# Patient Record
Sex: MALE | Race: BLACK OR AFRICAN AMERICAN | Employment: UNEMPLOYED | URBAN - METROPOLITAN AREA
[De-identification: names, ages, dates, MRNs, and addresses within clinical notes are randomized per-mention and may not be internally consistent; named-entity substitution may affect disease eponyms.]

---

## 2017-02-02 ENCOUNTER — OFFICE VISIT (OUTPATIENT)
Dept: INTERNAL MEDICINE CLINIC | Age: 52
End: 2017-02-02

## 2017-02-02 VITALS
TEMPERATURE: 98.7 F | BODY MASS INDEX: 22.43 KG/M2 | DIASTOLIC BLOOD PRESSURE: 80 MMHG | HEIGHT: 73 IN | WEIGHT: 169.2 LBS | OXYGEN SATURATION: 98 % | SYSTOLIC BLOOD PRESSURE: 117 MMHG | HEART RATE: 89 BPM

## 2017-02-02 DIAGNOSIS — J40 BRONCHITIS: Primary | ICD-10-CM

## 2017-02-02 RX ORDER — AZITHROMYCIN 250 MG/1
250 TABLET, FILM COATED ORAL SEE ADMIN INSTRUCTIONS
Qty: 6 TAB | Refills: 0 | Status: SHIPPED | OUTPATIENT
Start: 2017-02-02 | End: 2017-02-07

## 2017-02-02 NOTE — PROGRESS NOTES
HISTORY OF PRESENT ILLNESS  Selene Lares is a 49 y.o. male. HPI   Got sick last yesterday  Had subjective f/c, sweats and achiness  Cough with clear to yellow phlegm     saw nephrologist recently Dr Char Gallegos function stable per pt. Patient Active Problem List    Diagnosis Date Noted    Ulnar neuropathy at elbow of left upper extremity 09/07/2016    Cervical post-laminectomy syndrome 09/07/2016    Cervical radiculopathy due to degenerative joint disease of spine 09/07/2016    PPD positive 01/12/2015    ADD (attention deficit disorder with hyperactivity) 11/15/2011    Proteinuria 02/07/2011    FH: CAD (coronary artery disease) 12/16/2010    Solitary kidney 12/14/2010    Gout 12/14/2010    Elevated LFT's 12/14/2010    HTN (hypertension) 02/23/2010    Hyperglycemia 02/23/2010    PTSD (post-traumatic stress disorder) 02/23/2010    Chronic neck pain 02/23/2010    Anxiety 02/23/2010    Family history of diabetes mellitus 02/23/2010     Current Outpatient Prescriptions   Medication Sig Dispense Refill    azithromycin (ZITHROMAX) 250 mg tablet Take 1 Tab by mouth See Admin Instructions for 5 days. 6 Tab 0    sertraline (ZOLOFT) 100 mg tablet Take 1/2 tablet by mouth daily x 1 week and then take 1 whole tablet daily. 30 Tab 0    buPROPion XL (WELLBUTRIN XL) 150 mg tablet Take 1 Tab by mouth every morning. 30 Tab 0    methocarbamol (ROBAXIN) 750 mg tablet Take  by mouth three (3) times daily.  pyridoxine (VITAMIN B-6) 100 mg tablet Take 1 Tab by mouth daily. 30 Tab 0    amLODIPine (NORVASC) 10 mg tablet Take 1 Tab by mouth daily. 30 Tab 6    cloNIDine HCl (CATAPRES) 0.1 mg tablet Take 0.1 mg by mouth every evening.  gabapentin (NEURONTIN) 400 mg capsule Take 400 mg by mouth three (3) times daily.  multivitamin (ONE A DAY) tablet Take 1 Tab by mouth daily.  Cholecalciferol, Vitamin D3, 5,000 unit Tab Take 10,000 Int'l Units by mouth daily.  Indications: VITAMIN D DEFICIENCY      aspirin 81 mg tablet Take 1 Tab by mouth daily. 30 Tab 11    tiZANidine (ZANAFLEX) 2 mg tablet TAKE 1 TO 2 TABLETS PO THREE TIMES A DAY AS NEEDED FOR SPASMS  3    nabumetone (RELAFEN) 500 mg tablet Take  by mouth two (2) times a day.  tadalafil (CIALIS) 20 mg tablet TAKE 1 TABLET BY MOUTH AS NEEDED 8 Tab 6    HYDROcodone-acetaminophen (NORCO) 5-325 mg per tablet Take 1 Tab by mouth every six (6) hours as needed. Allergies   Allergen Reactions    Morphine Itching      Lab Results  Component Value Date/Time   Hemoglobin A1c 5.8 04/16/2010 12:06 PM   Glucose 89 03/11/2015 02:58 PM   LDL, calculated 100 03/11/2015 02:58 PM   Creatinine 1.34 03/11/2015 02:58 PM      Lab Results  Component Value Date/Time   Cholesterol, total 194 03/11/2015 02:58 PM   HDL Cholesterol 50 03/11/2015 02:58 PM   LDL, calculated 100 03/11/2015 02:58 PM   Triglyceride 222 03/11/2015 02:58 PM   CHOL/HDL Ratio 4.0 05/04/2013 07:00 AM       Lab Results  Component Value Date/Time   GFR est AA 72 03/11/2015 02:58 PM   GFR est non-AA 62 03/11/2015 02:58 PM   Creatinine 1.34 03/11/2015 02:58 PM   BUN 20 03/11/2015 02:58 PM   Sodium 139 03/11/2015 02:58 PM   Potassium 4.8 03/11/2015 02:58 PM   Chloride 100 03/11/2015 02:58 PM   CO2 22 03/11/2015 02:58 PM         ROS    Physical Exam   Constitutional: He appears well-developed and well-nourished. No distress. Appears stated age   HENT:   Head: Normocephalic. Mouth/Throat: Oropharynx is clear and moist.   Cardiovascular: Normal rate and regular rhythm. Exam reveals no gallop and no friction rub. No murmur heard. Pulmonary/Chest: Effort normal and breath sounds normal. No respiratory distress. He has no wheezes. He has no rales. He exhibits no tenderness. Abdominal: Soft. Musculoskeletal: He exhibits no edema. Lymphadenopathy:     He has no cervical adenopathy. Neurological: He is alert. Psychiatric: He has a normal mood and affect.    Nursing note and vitals reviewed. ASSESSMENT and PLAN  Adam Uribe was seen today for cold symptoms, nasal congestion, cough and dizziness. Diagnoses and all orders for this visit:    Bronchitis   zpak   otc cough syrup ok   To call if not improving. Other orders  -     azithromycin (ZITHROMAX) 250 mg tablet; Take 1 Tab by mouth See Admin Instructions for 5 days. Follow-up Disposition:  Return in about 6 months (around 8/2/2017) for cpe.

## 2017-02-02 NOTE — MR AVS SNAPSHOT
Visit Information Date & Time Provider Department Dept. Phone Encounter #  
 2/2/2017  1:45 PM Viet Salazar, [address] [place] [address],[address] [phone] [id number] Follow-up Instructions Return in about 6 months (around 8/2/2017) for cpe. Upcoming Health Maintenance Date Due DTaP/Tdap/Td series (1 - Tdap) 2/24/2009 MEDICARE YEARLY EXAM 4/23/2016 INFLUENZA AGE 9 TO ADULT 8/1/2016 FOBT Q 1 YEAR AGE 50-75 10/27/2016 Allergies as of 2/2/2017  Review Complete On: 2/2/2017 By: Glenda Melendez LPN Severity Noted Reaction Type Reactions Morphine  05/04/2013    Itching Current Immunizations  Reviewed on 12/6/2012 Name Date Influenza Vaccine 11/14/2014, 12/6/2012 11:16 AM  
 TB Skin Test (PPD) Intradermal 1/9/2015 Td 2/23/2009 Not reviewed this visit You Were Diagnosed With   
  
 Codes Comments Bronchitis    -  Primary ICD-10-CM: I62 ICD-9-CM: 864 Vitals BP Pulse Temp Height(growth percentile) Weight(growth percentile) SpO2  
 117/80 (BP 1 Location: Left arm, BP Patient Position: Sitting) 89 98.7 °F (37.1 °C) (Oral) 6' 1\" (1.854 m) 169 lb 3.2 oz (76.7 kg) 98% BMI Smoking Status 22.32 kg/m2 Former Smoker BMI and BSA Data Body Mass Index Body Surface Area  
 22.32 kg/m 2 1.99 m 2 Preferred Pharmacy Pharmacy Name Phone [facility] [address] [phone] Your Updated Medication List  
  
   
This list is accurate as of: 2/2/17  2:19 PM.  Always use your most recent med list. amLODIPine 10 mg tablet Commonly known as:  Arlyss Stephanie Take 1 Tab by mouth daily. aspirin 81 mg tablet Take 1 Tab by mouth daily. azithromycin 250 mg tablet Commonly known as:  Myrtle Horacio Take 1 Tab by mouth See Admin Instructions for 5 days. buPROPion  mg tablet Commonly known as:  Jonathan Havers Take 1 Tab by mouth every morning. cholecalciferol (VITAMIN D3) 5,000 unit Tab tablet Commonly known as:  VITAMIN D3 Take 10,000 Int'l Units by mouth daily. Indications: VITAMIN D DEFICIENCY  
  
 cloNIDine HCl 0.1 mg tablet Commonly known as:  CATAPRES Take 0.1 mg by mouth every evening.  
  
 gabapentin 400 mg capsule Commonly known as:  NEURONTIN Take 400 mg by mouth three (3) times daily. HYDROcodone-acetaminophen 5-325 mg per tablet Commonly known as:  Iven Lemons Take 1 Tab by mouth every six (6) hours as needed. methocarbamol 750 mg tablet Commonly known as:  ROBAXIN Take  by mouth three (3) times daily. multivitamin tablet Commonly known as:  ONE A DAY Take 1 Tab by mouth daily. nabumetone 500 mg tablet Commonly known as:  RELAFEN Take  by mouth two (2) times a day. pyridoxine (vitamin B6) 100 mg tablet Commonly known as:  VITAMIN B-6 Take 1 Tab by mouth daily. sertraline 100 mg tablet Commonly known as:  ZOLOFT Take 1/2 tablet by mouth daily x 1 week and then take 1 whole tablet daily. tadalafil 20 mg tablet Commonly known as:  CIALIS  
TAKE 1 TABLET BY MOUTH AS NEEDED  
  
 tiZANidine 2 mg tablet Commonly known as:  Keaau [place] TAKE 1 TO 2 TABLETS PO THREE TIMES A DAY AS NEEDED FOR SPASMS Prescriptions Sent to Pharmacy Refills  
 azithromycin (ZITHROMAX) 250 mg tablet 0 Sig: Take 1 Tab by mouth See Admin Instructions for 5 days. Class: Normal  
 Pharmacy: [facility] [address] [place] [address] #: [phone] Route: Oral  
  
Follow-up Instructions Return in about 6 months (around 8/2/2017) for cpe. Introducing [facility] & HEALTH SERVICES! Dear Guerline aGrcia: 
Thank you for requesting a [employer] account. Our records indicate that you already have an active [employer] account.   You can access your account anytime at [email or web address] Did you know that you can access your hospital and ER discharge instructions at any time in [employer]? You can also review all of your test results from your hospital stay or ER visit. Additional Information If you have questions, please visit the Frequently Asked Questions section of the [employer] website at [email or web address] Remember, [employer] is NOT to be used for urgent needs. For medical emergencies, dial 911. Now available from your iPhone and Android! Please provide this summary of care documentation to your next provider. Your primary care clinician is listed as Marcelle Cowden LEE. If you have any questions after today's visit, please call [phone].

## 2017-03-02 RX ORDER — BUPROPION HYDROCHLORIDE 150 MG/1
TABLET ORAL
Qty: 30 TAB | Refills: 0 | OUTPATIENT
Start: 2017-03-02

## 2017-03-02 RX ORDER — SERTRALINE HYDROCHLORIDE 100 MG/1
TABLET, FILM COATED ORAL
Qty: 30 TAB | Refills: 0 | OUTPATIENT
Start: 2017-03-02

## 2017-03-03 ENCOUNTER — DOCUMENTATION ONLY (OUTPATIENT)
Dept: BEHAVIORAL/MENTAL HEALTH CLINIC | Age: 52
End: 2017-03-03

## 2017-03-03 NOTE — LETTER
3/3/2017 Mr. Flex Salmeron [phone] [facility] [address] Dear Severo Daniele Douglasgurvinder good relationship between provider and patient is essential for quality medical care. The provider/patient relationship depends upon mutual trust, respect, and rapport. Our relationship has reached a point where these criteria are no longer intact. You have consistently failed to come in for scheduled appointments  For this reasons, I will no longer continue to serve as your provider and [address]oon must withdraw from your medical care and treatment effective this date. Although I have terminated our providerpatient relationship, I will assist you with the transition of your health care to other providers, as follows: 
 
1. On-Going/Acute Care. I will provide on-going/acute care for you for a period of 30 days, but in no case later than 4/1/17. After this date, you may seek care with another physician or your local emergency room. 2. Referrals for Future Medical Care. As you may need medical attention in the future, I recommend that you promptly find another provider to care for you. You may require ongoing medical attention for any current or future medical conditions, including but not limited to the following: depression, anxiety. You may contact your insurance company or the [employer] for a [employer] at [facility] for [employer] of physicians who are accepting new patients 3. Medical Records. I will provide you or your new health care provider with a copy of your records upon your request.  Since your records are confidential, Ill require your written authorization to make them available to another provider. Enclosed is an authorization form. Please complete it and return it to me so that we may transition your care. I extend to you my best wishes for your future health. Sincerely, Fredericka Merlin, NP 
 
 
 Enclosure

## 2017-03-09 RX ORDER — PYRIDOXINE HCL (VITAMIN B6) 100 MG
100 TABLET ORAL DAILY
Qty: 30 TAB | Refills: 0 | Status: SHIPPED | OUTPATIENT
Start: 2017-03-09

## 2017-03-14 RX ORDER — TADALAFIL 20 MG/1
TABLET ORAL
Qty: 8 TAB | Refills: 6 | Status: SHIPPED | OUTPATIENT
Start: 2017-03-14 | End: 2018-08-31 | Stop reason: SDUPTHER

## 2017-03-14 NOTE — TELEPHONE ENCOUNTER
Requested Prescriptions     Pending Prescriptions Disp Refills    tadalafil (CIALIS) 20 mg tablet 8 Tab 6     Sig: TAKE 1 TABLET BY MOUTH AS NEEDED   Last OV-2/6/17  Next OV-N/S  Med.  Refilled-3/4/16

## 2017-03-31 ENCOUNTER — OFFICE VISIT (OUTPATIENT)
Dept: INTERNAL MEDICINE CLINIC | Age: 52
End: 2017-03-31

## 2017-03-31 VITALS
HEIGHT: 73 IN | SYSTOLIC BLOOD PRESSURE: 152 MMHG | WEIGHT: 172 LBS | DIASTOLIC BLOOD PRESSURE: 86 MMHG | BODY MASS INDEX: 22.8 KG/M2 | OXYGEN SATURATION: 98 % | TEMPERATURE: 98.2 F | HEART RATE: 74 BPM

## 2017-03-31 DIAGNOSIS — K64.8 INTERNAL HEMORRHOIDS: Primary | ICD-10-CM

## 2017-03-31 DIAGNOSIS — K59.03 DRUG-INDUCED CONSTIPATION: ICD-10-CM

## 2017-03-31 RX ORDER — POLYETHYLENE GLYCOL 3350 17 G/17G
17 POWDER, FOR SOLUTION ORAL DAILY
Qty: 30 PACKET | Refills: 11 | Status: SHIPPED | OUTPATIENT
Start: 2017-03-31 | End: 2017-04-30

## 2017-03-31 NOTE — PROGRESS NOTES
HISTORY OF PRESENT ILLNESS  Waylon Hatchet is a 49 y.o. male. HPI   Constipated, takes lortab one every day for neck pain--Dr Mazin Harrison  Has hemorrhoids now with large bleeding 2 nights ago--painful bleeding  Takes benfiber, senokot which causes diarrhea and probiotic    Patient Active Problem List    Diagnosis Date Noted    Ulnar neuropathy at elbow of left upper extremity 09/07/2016    Cervical post-laminectomy syndrome 09/07/2016    Cervical radiculopathy due to degenerative joint disease of spine 09/07/2016    PPD positive 01/12/2015    ADD (attention deficit disorder with hyperactivity) 11/15/2011    Proteinuria 02/07/2011    FH: CAD (coronary artery disease) 12/16/2010    Solitary kidney 12/14/2010    Gout 12/14/2010    Elevated LFT's 12/14/2010    HTN (hypertension) 02/23/2010    Hyperglycemia 02/23/2010    PTSD (post-traumatic stress disorder) 02/23/2010    Chronic neck pain 02/23/2010    Anxiety 02/23/2010    Family history of diabetes mellitus 02/23/2010     Current Outpatient Prescriptions   Medication Sig Dispense Refill    polyethylene glycol (MIRALAX) 17 gram packet Take 1 Packet by mouth daily for 30 days. 30 Packet 11    pyridoxine, vitamin B6, (VITAMIN B-6) 100 mg tablet Take 1 Tab by mouth daily. 30 Tab 0    tiZANidine (ZANAFLEX) 2 mg tablet TAKE 1 TO 2 TABLETS PO THREE TIMES A DAY AS NEEDED FOR SPASMS  3    sertraline (ZOLOFT) 100 mg tablet Take 1/2 tablet by mouth daily x 1 week and then take 1 whole tablet daily. 30 Tab 0    buPROPion XL (WELLBUTRIN XL) 150 mg tablet Take 1 Tab by mouth every morning. 30 Tab 0    methocarbamol (ROBAXIN) 750 mg tablet Take  by mouth three (3) times daily.  nabumetone (RELAFEN) 500 mg tablet Take  by mouth two (2) times a day.  amLODIPine (NORVASC) 10 mg tablet Take 1 Tab by mouth daily. 30 Tab 6    HYDROcodone-acetaminophen (NORCO) 5-325 mg per tablet Take 1 Tab by mouth every six (6) hours as needed.       cloNIDine HCl (CATAPRES) 0.1 mg tablet Take 0.1 mg by mouth every evening.  gabapentin (NEURONTIN) 400 mg capsule Take 400 mg by mouth three (3) times daily.  multivitamin (ONE A DAY) tablet Take 1 Tab by mouth daily.  Cholecalciferol, Vitamin D3, 5,000 unit Tab Take 10,000 Int'l Units by mouth daily. Indications: VITAMIN D DEFICIENCY      aspirin 81 mg tablet Take 1 Tab by mouth daily. 30 Tab 11    tadalafil (CIALIS) 20 mg tablet TAKE 1 TABLET BY MOUTH AS NEEDED 8 Tab 6     Allergies   Allergen Reactions    Morphine Itching           ROS    Physical Exam   Constitutional: He appears well-developed and well-nourished. No distress. Appears stated age   HENT:   Head: Normocephalic. Cardiovascular: Normal rate, regular rhythm and normal heart sounds. Pulmonary/Chest: Effort normal and breath sounds normal.   Abdominal: Soft. Genitourinary:   Genitourinary Comments: No external hemorrhoids visualized   Musculoskeletal: He exhibits no edema. Neurological: He is alert. Psychiatric: He has a normal mood and affect. Nursing note and vitals reviewed. ASSESSMENT and PLAN  Oumou Dunn was seen today for hemorrhoids and neck pain. Diagnoses and all orders for this visit:    Internal hemorrhoids  -     REFERRAL TO COLON AND RECTAL SURGERY    Drug-induced constipation   Try miralax every day in place of senokot   Continue benifbre    Other orders  -     polyethylene glycol (MIRALAX) 17 gram packet; Take 1 Packet by mouth daily for 30 days. Follow-up Disposition:  Return in about 6 months (around 9/30/2017) for cpe.

## 2017-03-31 NOTE — MR AVS SNAPSHOT
Visit Information Date & Time Provider Department Dept. Phone Encounter #  
 3/31/2017 10:15 AM Ginger Godinez, [address] [facility] [address] [phone] [id number] Follow-up Instructions Return in about 6 months (around 9/30/2017) for cpe. Upcoming Health Maintenance Date Due DTaP/Tdap/Td series (1 - Tdap) 2/24/2009 MEDICARE YEARLY EXAM 4/23/2016 INFLUENZA AGE 9 TO ADULT 8/1/2016 FOBT Q 1 YEAR AGE 50-75 10/27/2016 Allergies as of 3/31/2017  Review Complete On: 3/31/2017 By: Margaret Stone LPN Severity Noted Reaction Type Reactions Morphine  05/04/2013    Itching Current Immunizations  Reviewed on 12/6/2012 Name Date Influenza Vaccine 11/14/2014, 12/6/2012 11:16 AM  
 TB Skin Test (PPD) Intradermal 1/9/2015 Td 2/23/2009 Not reviewed this visit You Were Diagnosed With   
  
 Codes Comments Internal hemorrhoids    -  Primary ICD-10-CM: G18.0 ICD-9-CM: 455.0 Drug-induced constipation     ICD-10-CM: K59.03 
ICD-9-CM: 564.09, E980.5 Vitals BP Pulse Temp Height(growth percentile) Weight(growth percentile) SpO2  
 152/86 (BP 1 Location: Left arm, BP Patient Position: Sitting) 74 98.2 °F (36.8 °C) (Oral) 6' 1\" (1.854 m) 172 lb (78 kg) 98% BMI Smoking Status 22.69 kg/m2 Former Smoker BMI and BSA Data Body Mass Index Body Surface Area  
 22.69 kg/m 2 2 m 2 Preferred Pharmacy Pharmacy Name Phone [facility] [address] [place] [address], [facility] [address] [phone] Your Updated Medication List  
  
   
This list is accurate as of: 3/31/17 11:08 AM.  Always use your most recent med list. amLODIPine 10 mg tablet Commonly known as:  Bj Presser Take 1 Tab by mouth daily. aspirin 81 mg tablet Take 1 Tab by mouth daily. buPROPion  mg tablet Commonly known as:  Brittany Scott  
 Take 1 Tab by mouth every morning. cholecalciferol (VITAMIN D3) 5,000 unit Tab tablet Commonly known as:  VITAMIN D3 Take 10,000 Int'l Units by mouth daily. Indications: VITAMIN D DEFICIENCY  
  
 cloNIDine HCl 0.1 mg tablet Commonly known as:  CATAPRES Take 0.1 mg by mouth every evening.  
  
 gabapentin 400 mg capsule Commonly known as:  NEURONTIN Take 400 mg by mouth three (3) times daily. HYDROcodone-acetaminophen 5-325 mg per tablet Commonly known as:  Tiffany Arts Take 1 Tab by mouth every six (6) hours as needed. methocarbamol 750 mg tablet Commonly known as:  ROBAXIN Take  by mouth three (3) times daily. multivitamin tablet Commonly known as:  ONE A DAY Take 1 Tab by mouth daily. nabumetone 500 mg tablet Commonly known as:  RELAFEN Take  by mouth two (2) times a day. polyethylene glycol 17 gram packet Commonly known as:  Philly Torres Take 1 Packet by mouth daily for 30 days. pyridoxine (vitamin B6) 100 mg tablet Commonly known as:  VITAMIN B-6 Take 1 Tab by mouth daily. sertraline 100 mg tablet Commonly known as:  ZOLOFT Take 1/2 tablet by mouth daily x 1 week and then take 1 whole tablet daily. tadalafil 20 mg tablet Commonly known as:  CIALIS  
TAKE 1 TABLET BY MOUTH AS NEEDED  
  
 tiZANidine 2 mg tablet Commonly known as:  Jannine Bridges TAKE 1 TO 2 TABLETS PO THREE TIMES A DAY AS NEEDED FOR SPASMS Prescriptions Sent to Pharmacy Refills  
 polyethylene glycol (MIRALAX) 17 gram packet 11 Sig: Take 1 Packet by mouth daily for 30 days. Class: Normal  
 Pharmacy: [facility] [address] [place] [address] #: [phone] Route: Oral  
  
We Performed the Following REFERRAL TO COLON AND RECTAL SURGERY [REF17 Custom] Comments:  
 Please evaluate patient for internal hemorrhoids. Follow-up Instructions Return in about 6 months (around 9/30/2017) for cpe. Referral Information Referral ID Referred By Referred To  
  
 [id number] [facility] [address] Visits Status Start Date End Date 1 New Request 3/31/17 3/31/18 If your referral has a status of pending review or denied, additional information will be sent to support the outcome of this decision. Introducing [facility] & HEALTH SERVICES! Dear Radha Huangr: 
Thank you for requesting a [employer] account. Our records indicate that you already have an active [employer] account. You can access your account anytime at [email or web address] Did you know that you can access your hospital and ER discharge instructions at any time in [employer]? You can also review all of your test results from your hospital stay or ER visit. Additional Information If you have questions, please visit the Frequently Asked Questions section of the [employer] website at [email or web address] Remember, [employer] is NOT to be used for urgent needs. For medical emergencies, dial 911. Now available from your iPhone and Android! Please provide this summary of care documentation to your next provider. Your primary care clinician is listed as Alec BURDICK. If you have any questions after today's visit, please call [phone].

## 2017-04-13 ENCOUNTER — HOSPITAL ENCOUNTER (OUTPATIENT)
Dept: CT IMAGING | Age: 52
Discharge: HOME | End: 2017-04-13
Attending: PHYSICIAN ASSISTANT
Payer: MEDICARE

## 2017-04-13 DIAGNOSIS — M54.12 RADICULOPATHY, CERVICAL: ICD-10-CM

## 2017-04-13 PROCEDURE — 72125 CT NECK SPINE W/O DYE: CPT

## 2017-04-20 ENCOUNTER — HOSPITAL ENCOUNTER (OUTPATIENT)
Dept: MRI IMAGING | Age: 52
Discharge: HOME | End: 2017-04-20
Attending: PHYSICIAN ASSISTANT
Payer: MEDICARE

## 2017-04-20 DIAGNOSIS — M54.12 RADICULOPATHY, CERVICAL: ICD-10-CM

## 2017-04-20 PROCEDURE — 72141 MRI NECK SPINE W/O DYE: CPT

## 2017-04-27 ENCOUNTER — HOSPITAL ENCOUNTER (OUTPATIENT)
Dept: PHYSICAL THERAPY | Age: 52
Discharge: HOME | End: 2017-04-27
Payer: MEDICARE

## 2017-04-27 PROCEDURE — 97162 PT EVAL MOD COMPLEX 30 MIN: CPT | Performed by: PHYSICAL THERAPIST

## 2017-04-27 PROCEDURE — G8984 CARRY CURRENT STATUS: HCPCS | Performed by: PHYSICAL THERAPIST

## 2017-04-27 PROCEDURE — G8985 CARRY GOAL STATUS: HCPCS | Performed by: PHYSICAL THERAPIST

## 2017-04-27 PROCEDURE — 97014 ELECTRIC STIMULATION THERAPY: CPT | Performed by: PHYSICAL THERAPIST

## 2017-04-27 PROCEDURE — 97140 MANUAL THERAPY 1/> REGIONS: CPT | Performed by: PHYSICAL THERAPIST
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
## 2017-05-04 ENCOUNTER — HOSPITAL ENCOUNTER (OUTPATIENT)
Dept: PHYSICAL THERAPY | Age: 52
Discharge: HOME | End: 2017-05-04
Payer: MEDICARE

## 2017-05-04 PROCEDURE — 97014 ELECTRIC STIMULATION THERAPY: CPT | Performed by: PHYSICAL THERAPIST

## 2017-05-04 PROCEDURE — 97110 THERAPEUTIC EXERCISES: CPT | Performed by: PHYSICAL THERAPIST

## 2017-05-04 PROCEDURE — 97140 MANUAL THERAPY 1/> REGIONS: CPT | Performed by: PHYSICAL THERAPIST

## 2017-05-04 PROCEDURE — 97012 MECHANICAL TRACTION THERAPY: CPT | Performed by: PHYSICAL THERAPIST

## 2017-05-04 NOTE — PROGRESS NOTES
PT DAILY TREATMENT NOTE - [facility] 2-15    Patient Name: Robley Hashimoto  Date:2017  : 1965  [x]  Patient  Verified  Payor: [employer]ARP MEDICARE COMPLETE / Plan: [facility] MEDICARE COMPLETE / Product Type: Managed Care Medicare /    In time: 8:30Out time:9:40a  Total Treatment Time (min): 70  Total Timed Codes (min): 62  1:1 Treatment Time ( [address] only): 62  Visit #: 2    Treatment Area: Radiculopathy, cervical region [M54.12]    SUBJECTIVE  Pain Level (0-10 scale): 8/10  Any medication changes, allergies to medications, adverse drug reactions, diagnosis change, or new procedure performed?: [x] No    [] Yes (see summary sheet for update)  Subjective functional status/changes:   [] No changes reported  The pr reports he felt better following last session but since Tuesday he has had increased stiffness.              Modality rationale: decrease pain and increase tissue extensibility to improve the patients ability to perform daily activities with less pain. Min Type Additional Details   15 [] Estim: []Att []Unatt []TENS instruct  [x]IFC []Premod []NMES   []Other:  []w/US []w/ice [x]w/heat  Position: supine Location: cervical   12 [x] Traction: [x] Cervical []Lumbar  [] Prone [x]Supine  [x]Intermittent []Continuous Lbs:17lbs  [] before manual  [x] after manual  []w/heat     [] Ultrasound: []Continuous [] Pulsed at:  []1MHz []3MHz Location:  W/cm2:     [] Paraffin      Location:   []w/heat     [] Ice [] Heat  [] Ice massage Position:  Location:     [] Laser  [] Other: Position:  Location:     [] Vasopneumatic Device Pressure: [] lo [] med [] hi   Temperature:       [x] Skin assessment post-treatment: [x]intact []redness- no adverse reaction  []redness - adverse reaction:        20 min Therapeutic Exercise: See Flow Sheet   Rationale: decrease pain, increase ROM and increase tissue extensibility to improve the patients ability to perform IADLS with less difficulty.      15 min Manual Therapy: Manual Cervical distraction in 15-20 degrees flexion. STM to levator, UT, and scalenes,, SCM on the right. Rationale: decrease pain, increase ROM and increase tissue extensibility to improve the patients ability to perform IADLS with less difficulty.          With  [] TE  [] TA  [] neuro  [] other: Patient Education: [x] Review HEP   [] Progressed/Changed HEP based on:   [] positioning [] body mechanics [] transfers [] heat/ice application   [] other:           Other Objective/Functional Measures: -     Pain Level (0-10 scale) post treatment: 7/10 \"stiffness\"    ASSESSMENT/Changes in Function:    Pt did well with all interventions today and felt good relief with mechanical traction. Performed at 17 lbs 20 degrees of flexion. Still reported stiffness following session. Patient will continue to benefit from skilled PT services to modify and progress therapeutic interventions, address functional mobility deficits, address ROM deficits, address strength deficits, analyze and address soft tissue restrictions, analyze and modify body mechanics/ergonomics, assess and modify postural abnormalities and instruct in home and community integration to attain remaining goals. []  See Plan of Care  []  See progress note/recertification  []  See Discharge Summary         Progress towards goals / Updated goals:    Short Term Goals: To be accomplished in 4 weeks:  1.) Pt will be independent with HEP in order to better manage s/s.   2.) Pt will report pain levels of 5/10 at rest for 4 sessions in order to improve tolerance to daily activities.      Long Term Goals:  To be accomplished in 8 weeks:  1) Pt will have 20 degrees of cervical rotation to the right in order to improve driving confidence and performance  2) pt will have  strength on the right to 15 lbs  3) Pt will have pain levels to 2-3/10 at rest to improve tolerance to daily activities.        PLAN  []  Upgrade activities as tolerated     []  Continue plan of care  []  Update interventions per flow sheet       []  Discharge due to:_  []  Other:_      Jamal Mario 5/4/2017  9:21 AM

## 2017-05-10 ENCOUNTER — HOSPITAL ENCOUNTER (OUTPATIENT)
Dept: PHYSICAL THERAPY | Age: 52
Discharge: HOME | End: 2017-05-10
Payer: MEDICARE

## 2017-05-10 PROCEDURE — 97110 THERAPEUTIC EXERCISES: CPT | Performed by: PHYSICAL THERAPIST

## 2017-05-10 PROCEDURE — 97140 MANUAL THERAPY 1/> REGIONS: CPT | Performed by: PHYSICAL THERAPIST

## 2017-05-10 PROCEDURE — 97012 MECHANICAL TRACTION THERAPY: CPT | Performed by: PHYSICAL THERAPIST

## 2017-05-10 NOTE — PROGRESS NOTES
PT DAILY TREATMENT NOTE - [facility] 2-15    Patient Name: Jacque Gomez  Date:5/10/2017  : 1965  [x]  Patient  Verified  Payor: [employer]ARP MEDICARE COMPLETE / Plan: [facility] MEDICARE COMPLETE / Product Type: Managed Care Medicare /    In time 5:00p  Out time:6:15p  Total Treatment Time (min): 75  Total Timed Codes (min): 75  1:1 Treatment Time ([address] only): 75   Visit #: 3    Treatment Area: Radiculopathy, cervical region [M54.12]    SUBJECTIVE  Pain Level (0-10 scale): 810  Any medication changes, allergies to medications, adverse drug reactions, diagnosis change, or new procedure performed?: [x] No    [] Yes (see summary sheet for update)  Subjective functional status/changes:   [] No changes reported  Feeling stiff and painful. Think the weather might have something to do with it. OBJECTIVE            Modality rationale: decrease pain and increase tissue extensibility to improve the patients ability to perform daily activities with less pain. Min Type Additional Details   15 [] Estim: []Att []Unatt []TENS instruct  [x]IFC []Premod []NMES   []Other:  []w/US []w/ice [x]w/heat  Position: supine Location: cervical   12 [x] Traction: [x] Cervical []Lumbar  [] Prone [x]Supine  [x]Intermittent []Continuous Lbs:17lbs  [] before manual  [x] after manual  []w/heat      [] Ultrasound: []Continuous [] Pulsed at:  []1MHz []3MHz Location:  W/cm2:      [] Paraffin      Location:   []w/heat      [] Ice [] Heat  [] Ice massage Position:  Location:      [] Laser  [] Other: Position:  Location:      [] Vasopneumatic Device Pressure: [] lo [] med [] hi   Temperature:        [x] Skin assessment post-treatment: [x]intact []redness- no adverse reaction  []redness - adverse reaction:          20 min Therapeutic Exercise: See Flow Sheet   Rationale: decrease pain, increase ROM and increase tissue extensibility to improve the patients ability to perform IADLS with less difficulty.       15 min Manual Therapy: Manual Cervical distraction in 15-20 degrees flexion. STM to levator, UT, and scalenes,, SCM on the right. Rationale: decrease pain, increase ROM and increase tissue extensibility to improve the patients ability to perform IADLS with less difficulty.         With  [] TE  [] TA  [] neuro  [] other: Patient Education: [x] Review HEP   [] Progressed/Changed HEP based on:   [] positioning [] body mechanics [] transfers [] heat/ice application   [] other:              Other Objective/Functional Measures: -      Pain Level (0-10 scale) post treatment: 6/10 \"feel better\"     ASSESSMENT/Changes in Function:   Pt is very guarded and has pain at all times. He did report improvement following session and gets relief from traction. Patient will continue to benefit from skilled PT services to modify and progress therapeutic interventions, address functional mobility deficits, address ROM deficits, address strength deficits, analyze and address soft tissue restrictions, analyze and modify body mechanics/ergonomics, assess and modify postural abnormalities and instruct in home and community integration to attain remaining goals.      [] See Plan of Care  [] See progress note/recertification  [] See Discharge Summary      Progress towards goals / Updated goals:     Short Term Goals: To be accomplished in 4 weeks:  1.) Pt will be independent with HEP in order to better manage s/s.   2.) Pt will report pain levels of 5/10 at rest for 4 sessions in order to improve tolerance to daily activities.       Long Term Goals:  To be accomplished in 8 weeks:  1) Pt will have 20 degrees of cervical rotation to the right in order to improve driving confidence and performance  2) pt will have  strength on the right to 15 lbs  3) Pt will have pain levels to 2-3/10 at rest to improve tolerance to daily activities.         PLAN  [] Upgrade activities as tolerated [] Continue plan of care  [] Update interventions per flow sheet   [] Discharge due to:_  [] Other:Anahi Lao 5/10/2017  6:31 PM

## 2017-05-12 ENCOUNTER — APPOINTMENT (OUTPATIENT)
Dept: PHYSICAL THERAPY | Age: 52
End: 2017-05-12
Payer: MEDICARE

## 2017-05-15 ENCOUNTER — HOSPITAL ENCOUNTER (OUTPATIENT)
Dept: PHYSICAL THERAPY | Age: 52
Discharge: HOME | End: 2017-05-15
Payer: MEDICARE

## 2017-05-15 PROCEDURE — 97012 MECHANICAL TRACTION THERAPY: CPT | Performed by: PHYSICAL THERAPIST

## 2017-05-15 PROCEDURE — 97140 MANUAL THERAPY 1/> REGIONS: CPT | Performed by: PHYSICAL THERAPIST

## 2017-05-15 NOTE — PROGRESS NOTES
PT DAILY TREATMENT NOTE - [facility] 2-15    Patient Name: Severiano Quiroz  Date:5/15/2017  : 1965  [x]  Patient  Verified  Payor: [employer]ARP MEDICARE COMPLETE / Plan: [facility] MEDICARE COMPLETE / Product Type: Managed Care Medicare /    In time:5:00p Out time:5:40p  Total Treatment Time (min): 40  Total Timed Codes (min): 35  1:1 Treatment Time ( only): 35  Visit #: 4     Treatment Area: Radiculopathy, cervical region [M54.12]    SUBJECTIVE  Pain Level (0-10 scale): 7/10  Any medication changes, allergies to medications, adverse drug reactions, diagnosis change, or new procedure performed?: [x] No    [] Yes (see summary sheet for update)  Subjective functional status/changes:   [] No changes reported  The pt is doing better this week and feeling improvement with the weather. OBJECTIVE               Modality rationale: decrease pain and increase tissue extensibility to improve the patients ability to perform daily activities with less pain.     Min Type Additional Details   15 [] Estim: []Att []Unatt []TENS instruct  [x]IFC []Premod []NMES   []Other:  []w/US []w/ice [x]w/heat  Position: supine Location: cervical   12 [x] Traction: [x] Cervical []Lumbar  [] Prone [x]Supine  [x]Intermittent []Continuous Lbs:17lbs  [] before manual  [x] after manual  []w/heat      [] Ultrasound: []Continuous [] Pulsed at:  []1MHz []3MHz Location:  W/cm2:      [] Paraffin      Location:   []w/heat      [] Ice [] Heat  [] Ice massage Position:  Location:      [] Laser  [] Other: Position:  Location:      [] Vasopneumatic Device Pressure: [] lo [] med [] hi   Temperature:        [x] Skin assessment post-treatment: [x]intact []redness- no adverse reaction  []redness - adverse reaction:           - min Therapeutic Exercise: See Flow Sheet   Rationale: decrease pain, increase ROM and increase tissue extensibility to improve the patients ability to perform IADLS with less difficulty.       15 min Manual Therapy: Manual Cervical distraction in 15-20 degrees flexion. STM to levator, UT, and scalenes,, SCM on the right. Rationale: decrease pain, increase ROM and increase tissue extensibility to improve the patients ability to perform IADLS with less difficulty.         With  [] TE  [] TA  [] neuro  [] other: Patient Education: [x] Review HEP   [] Progressed/Changed HEP based on:   [] positioning [] body mechanics [] transfers [] heat/ice application   [] other:                Other Objective/Functional Measures: -       Pain Level (0-10 scale) post treatment: 5/10 \"feel better\"      ASSESSMENT/Changes in Function:   Pt is doing better this week with weather changing. He was pretty painful last week. Limited on time today, so performed manual and traction. Patient will continue to benefit from skilled PT services to modify and progress therapeutic interventions, address functional mobility deficits, address ROM deficits, address strength deficits, analyze and address soft tissue restrictions, analyze and modify body mechanics/ergonomics, assess and modify postural abnormalities and instruct in home and community integration to attain remaining goals.      [x] See Plan of Care  [] See progress note/recertification  [] See Discharge Summary      Progress towards goals / Updated goals:      Short Term Goals: To be accomplished in 4 weeks:  1.) Pt will be independent with HEP in order to better manage s/s.   2.) Pt will report pain levels of 5/10 at rest for 4 sessions in order to improve tolerance to daily activities.       Long Term Goals: To be accomplished in 8 weeks:  1) Pt will have 20 degrees of cervical rotation to the right in order to improve driving confidence and performance  2) pt will have  strength on the right to 15 lbs  3) Pt will have pain levels to 2-3/10 at rest to improve tolerance to daily activities.            PLAN  [] Upgrade activities as tolerated [x] Continue plan of care  [] Update interventions per flow sheet   [] Discharge due to:_  [] Other:_     Filomena Miss 5/15/2017  5:44 PM

## 2017-05-17 ENCOUNTER — APPOINTMENT (OUTPATIENT)
Dept: PHYSICAL THERAPY | Age: 52
End: 2017-05-17
Payer: MEDICARE

## 2017-05-22 ENCOUNTER — APPOINTMENT (OUTPATIENT)
Dept: PHYSICAL THERAPY | Age: 52
End: 2017-05-22
Payer: MEDICARE

## 2017-05-23 ENCOUNTER — OFFICE VISIT (OUTPATIENT)
Dept: INTERNAL MEDICINE CLINIC | Age: 52
End: 2017-05-23

## 2017-05-23 VITALS
SYSTOLIC BLOOD PRESSURE: 134 MMHG | HEART RATE: 79 BPM | BODY MASS INDEX: 23.99 KG/M2 | OXYGEN SATURATION: 97 % | RESPIRATION RATE: 18 BRPM | TEMPERATURE: 98.1 F | DIASTOLIC BLOOD PRESSURE: 79 MMHG | WEIGHT: 181 LBS | HEIGHT: 73 IN

## 2017-05-23 DIAGNOSIS — Z00.00 ROUTINE GENERAL MEDICAL EXAMINATION AT A HEALTH CARE FACILITY: ICD-10-CM

## 2017-05-23 DIAGNOSIS — Z12.5 PROSTATE CANCER SCREENING: Primary | ICD-10-CM

## 2017-05-23 DIAGNOSIS — Z13.39 SCREENING FOR ALCOHOLISM: ICD-10-CM

## 2017-05-23 DIAGNOSIS — I10 ESSENTIAL HYPERTENSION: ICD-10-CM

## 2017-05-23 RX ORDER — PYRIDOXINE HCL (VITAMIN B6) 100 MG
100 TABLET ORAL DAILY
Qty: 30 TAB | Refills: 0 | Status: CANCELLED | OUTPATIENT
Start: 2017-05-23

## 2017-05-23 RX ORDER — METHOCARBAMOL 750 MG/1
TABLET, FILM COATED ORAL 3 TIMES DAILY
Status: CANCELLED | OUTPATIENT
Start: 2017-05-23

## 2017-05-23 RX ORDER — CYCLOBENZAPRINE HCL 5 MG
TABLET ORAL
COMMUNITY
Start: 2017-04-21 | End: 2018-08-09

## 2017-05-23 RX ORDER — TIZANIDINE 2 MG/1
TABLET ORAL
Qty: 30 TAB | Refills: 3 | Status: SHIPPED | OUTPATIENT
Start: 2017-05-23 | End: 2018-03-05 | Stop reason: SDUPTHER

## 2017-05-23 NOTE — MR AVS SNAPSHOT
Visit Information Date & Time Provider Department Dept. Phone Encounter #  
 5/23/2017  3:15 PM Ivettes Annalise, [address] [place] [address],[address] [phone] [id number] Follow-up Instructions Return in about 6 months (around 11/23/2017) for f/u HTN depression. Upcoming Health Maintenance Date Due DTaP/Tdap/Td series (1 - Tdap) 2/24/2009 MEDICARE YEARLY EXAM 4/23/2016 INFLUENZA AGE 9 TO ADULT 8/1/2017 COLONOSCOPY 9/30/2020 Allergies as of 5/23/2017  Review Complete On: 5/23/2017 By: Christine Woody MD  
  
 Severity Noted Reaction Type Reactions Morphine  05/04/2013    Itching Current Immunizations  Reviewed on 12/6/2012 Name Date Influenza Vaccine 11/14/2014, 12/6/2012 11:16 AM  
 TB Skin Test (PPD) Intradermal 1/9/2015 Td 2/23/2009 Not reviewed this visit You Were Diagnosed With   
  
 Codes Comments Prostate cancer screening    -  Primary ICD-10-CM: Z12.5 ICD-9-CM: V76.44 Routine general medical examination at a health care facility     ICD-10-CM: Z00.00 ICD-9-CM: V70.0 Screening for alcoholism     ICD-10-CM: Z13.89 ICD-9-CM: V79.1 Essential hypertension     ICD-10-CM: I10 
ICD-9-CM: 401.9 Vitals BP Pulse Temp Resp Height(growth percentile) Weight(growth percentile) 134/79 (BP 1 Location: Left arm, BP Patient Position: Sitting) 79 98.1 °F (36.7 °C) (Oral) 18 6' 1\" (1.854 m) 181 lb (82.1 kg) SpO2 BMI Smoking Status 97% 23.88 kg/m2 Former Smoker BMI and BSA Data Body Mass Index Body Surface Area  
 23.88 kg/m 2 2.06 m 2 Preferred Pharmacy Pharmacy Name Phone [facility] [address], [address] [place] [address] Drive [phone] Your Updated Medication List  
  
   
This list is accurate as of: 5/23/17  4:04 PM.  Always use your most recent med list. amLODIPine 10 mg tablet Commonly known as:  Karyle Rohrer Take 1 Tab by mouth daily. aspirin 81 mg tablet Take 1 Tab by mouth daily. buPROPion  mg tablet Commonly known as:  Padma Drum Take 1 Tab by mouth every morning. cholecalciferol (VITAMIN D3) 5,000 unit Tab tablet Commonly known as:  VITAMIN D3 Take 10,000 Int'l Units by mouth daily. Indications: VITAMIN D DEFICIENCY  
  
 cloNIDine HCl 0.1 mg tablet Commonly known as:  CATAPRES Take 0.1 mg by mouth every evening. cyclobenzaprine 5 mg tablet Commonly known as:  FLEXERIL  
  
 gabapentin 400 mg capsule Commonly known as:  NEURONTIN Take 400 mg by mouth three (3) times daily. HYDROcodone-acetaminophen 5-325 mg per tablet Commonly known as:  Elyn Barley Take 1 Tab by mouth every six (6) hours as needed. methocarbamol 750 mg tablet Commonly known as:  ROBAXIN Take  by mouth three (3) times daily. multivitamin tablet Commonly known as:  ONE A DAY Take 1 Tab by mouth daily. nabumetone 500 mg tablet Commonly known as:  RELAFEN Take  by mouth two (2) times a day. pyridoxine (vitamin B6) 100 mg tablet Commonly known as:  VITAMIN B-6 Take 1 Tab by mouth daily. sertraline 100 mg tablet Commonly known as:  ZOLOFT Take 1/2 tablet by mouth daily x 1 week and then take 1 whole tablet daily. tadalafil 20 mg tablet Commonly known as:  CIALIS  
TAKE 1 TABLET BY MOUTH AS NEEDED  
  
 tiZANidine 2 mg tablet Commonly known as:  [place] Hides TAKE 1 TO 2 TABLETS PO THREE TIMES A DAY AS NEEDED FOR SPASMS Prescriptions Sent to Pharmacy Refills  
 tiZANidine (ZANAFLEX) 2 mg tablet 3 Sig: TAKE 1 TO 2 TABLETS PO THREE TIMES A DAY AS NEEDED FOR SPASMS Class: Normal  
 Pharmacy: [facility] [address] [place] [address] Ph #: [phone] We Performed the Following LIPID PANEL [81718 CPT(R)] PROSTATE SPECIFIC AG (PSA) [id number] CPT(R)] Follow-up Instructions Return in about 6 months (around 11/23/2017) for f/u HTN depression. To-Do List   
 05/24/2017 12:30 PM  
  Appointment with Oleg Ashlyns at [facility] [address] ([phone]) Please remember to arrive at the hospital at least 30 minutes prior to your scheduled appointment time. When you come in for your appointment, please be sure to bring the therapy prescription the doctor gave you, your insurance card, and a list of the medicines you are taking. Also, please remember to wear comfortable, loose- fitting clothes. We look forward to seeing you. 05/31/2017 12:00 PM  
  Appointment with Dejesus Ashlyns at [facility] [address] ([phone]) Please remember to arrive at the hospital at least 30 minutes prior to your scheduled appointment time. When you come in for your appointment, please be sure to bring the therapy prescription the doctor gave you, your insurance card, and a list of the medicines you are taking. Also, please remember to wear comfortable, loose- fitting clothes. We look forward to seeing you. Introducing [facility]! Dear Patricia Santana: 
Thank you for requesting a [employer] account. Our records indicate that you already have an active [employer] account. You can access your account anytime at [email or web address]. [employer] Did you know that you can access your hospital and ER discharge instructions at any time in [employer]? You can also review all of your test results from your hospital stay or ER visit. Additional Information If you have questions, please visit the Frequently Asked Questions section of the [employer] website at [email or web address] Remember, [employer] is NOT to be used for urgent needs. For medical emergencies, dial 911. Now available from your iPhone and Android! Please provide this summary of care documentation to your next provider. Your primary care clinician is listed as Nikko BURDICK. If you have any questions after today's visit, please call [phone].

## 2017-05-23 NOTE — PROGRESS NOTES
This is a Subsequent Medicare Annual Wellness Visit providing Personalized Prevention Plan Services (PPPS) (Performed 12 months after initial AWV and PPPS )    I have reviewed the patient's medical history in detail and updated the computerized patient record. History     Past Medical History:   Diagnosis Date    Arthritis     gout    Chronic kidney disease     no left kidney (congenital); Stage II kidney failure    [facility] failure ([facility]     Hypertension     MI (mitral incompetence)     5/10/13    Psychiatric disorder     Depression & PTSD    PTSD (post-traumatic stress disorder)     Seizures ([facility]     ? r/t PTSD- last seizure 2011      Past Surgical History:   Procedure Laterality Date    HX ORTHOPAEDIC  2007    cervical anterior discectomy and fusion--Dr. Lisette Whitlock     Current Outpatient Prescriptions   Medication Sig Dispense Refill    cyclobenzaprine (FLEXERIL) 5 mg tablet       pyridoxine, vitamin B6, (VITAMIN B-6) 100 mg tablet Take 1 Tab by mouth daily. 30 Tab 0    sertraline (ZOLOFT) 100 mg tablet Take 1/2 tablet by mouth daily x 1 week and then take 1 whole tablet daily. 30 Tab 0    buPROPion XL (WELLBUTRIN XL) 150 mg tablet Take 1 Tab by mouth every morning. 30 Tab 0    methocarbamol (ROBAXIN) 750 mg tablet Take  by mouth three (3) times daily.  amLODIPine (NORVASC) 10 mg tablet Take 1 Tab by mouth daily. 30 Tab 6    HYDROcodone-acetaminophen (NORCO) 5-325 mg per tablet Take 1 Tab by mouth every six (6) hours as needed.  cloNIDine HCl (CATAPRES) 0.1 mg tablet Take 0.1 mg by mouth every evening.  gabapentin (NEURONTIN) 400 mg capsule Take 400 mg by mouth three (3) times daily.  multivitamin (ONE A DAY) tablet Take 1 Tab by mouth daily.  Cholecalciferol, Vitamin D3, 5,000 unit Tab Take 10,000 Int'l Units by mouth daily. Indications: VITAMIN D DEFICIENCY      aspirin 81 mg tablet Take 1 Tab by mouth daily.  30 Tab 11    tadalafil (CIALIS) 20 mg tablet TAKE 1 TABLET BY MOUTH AS NEEDED 8 Tab 6    tiZANidine (ZANAFLEX) 2 mg tablet TAKE 1 TO 2 TABLETS PO THREE TIMES A DAY AS NEEDED FOR SPASMS  3    nabumetone (RELAFEN) 500 mg tablet Take  by mouth two (2) times a day. Allergies   Allergen Reactions    Morphine Itching     Family History   Problem Relation Age of Onset    Diabetes Mother     Diabetes Father     Heart Disease Father     Cancer Father      prostate cancer    Hypertension Sister      Social History   Substance Use Topics    Smoking status: Former Smoker     Packs/day: 0.30     Types: Cigarettes    Smokeless tobacco: Never Used    Alcohol use No     Patient Active Problem List   Diagnosis Code    HTN (hypertension) I10    Hyperglycemia R73.9    PTSD (post-traumatic stress disorder) F43.10    Chronic neck pain M54.2, G89.29    Anxiety F41.9    Family history of diabetes mellitus Z83.3    Solitary kidney Q60.0    Gout M10.9    Elevated LFT's     FH: CAD (coronary artery disease) Z82.49    Proteinuria R80.9    ADD (attention deficit disorder with hyperactivity)     PPD positive R76.11    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 No    Do you average more than 14 drinks per week? No      Functional Ability and Level of Safety:     Hearing Loss   moderate    Activities of Daily Living   Self-care. Requires assistance with: no ADLs    Fall Risk   No flowsheet data found.   Abuse Screen   Patient is not abused    Review of Systems   A comprehensive review of systems was negative except for that written in the HPI. Physical Examination     Evaluation of Cognitive Function:  Mood/affect:  neutral, sad  Appearance: age appropriate, casually dressed and within normal Limits  Family member/caregiver input:     Visit Vitals    /79 (BP 1 Location: Left arm, BP Patient Position: Sitting)    Pulse 79    Temp 98.1 °F (36.7 °C) (Oral)    Resp 18    Ht 6' 1\" (1.854 m)    Wt 181 lb (82.1 kg)    SpO2 97%    BMI 23.88 kg/m2     General:  Alert, cooperative, no distress, appears stated age. Head:  Normocephalic, without obvious abnormality, atraumatic. Eyes:  Conjunctivae/corneas clear. PERRL, EOMs intact. Fundi benign   Ears:  Normal TMs and external ear canals both ears. Nose: Nares normal. Septum midline. Mucosa normal. No drainage or sinus tenderness. Throat: Lips, mucosa, and tongue normal. Teeth and gums normal.   Neck: Supple, symmetrical, trachea midline, no adenopathy, thyroid: no enlargement/tenderness/nodules, no carotid bruit and no JVD. Back:   Symmetric, no curvature. ROM normal. No CVA tenderness. Lungs:   Clear to auscultation bilaterally. Chest wall:  No tenderness or deformity. Heart:  Regular rate and rhythm, S1, S2 normal, no murmur, click, rub or gallop. Abdomen:   Soft, non-tender. Bowel sounds normal. No masses,  No organomegaly. Genitalia:  Normal male without lesion, discharge or tenderness. Rectal:  Normal tone, normal prostate, no masses or tenderness  Guaiac negative stool. Extremities: Extremities normal, atraumatic, no cyanosis or edema. Pulses: 2+ and symmetric all extremities. Skin: Skin color, texture, turgor normal. No rashes or lesions   Lymph nodes: Cervical, supraclavicular, and axillary nodes normal.   Neurologic: CNII-XII intact. Normal strength, sensation and reflexes throughout.        Patient Care Team:  Cat Chavez MD as PCP - General (Internal Medicine)  David Vigil Neftali Garcia, RN as [facility]  Syl Hardy MD (Orthopedic Surgery)  Michelle Mejias MD (Gastroenterology)  See Walker MD (Dermatology)  Belkis Santoyo MD (Nephrology)  Neal Gregory MD (Orthopedic Surgery)  Dr. Cindy Burnett (Psychiatry)  Zhen Dixon MD (Orthopedic Surgery)  Juan Blair MD (Neurology)    Advice/Referrals/Counseling   Education and counseling provided:  Are appropriate based on today's review and evaluation  End-of-Life planning (with patient's consent)--AMD form given to patient for completion  Prostate cancer screening tests (PSA, covered annually)-PSA ordered  Lipid panel ordered  tdap-advise pt to get at local pharmacy    Assessment/Plan   There are no diagnoses linked to this encounter. Follow-up Disposition: Not on File.     CPE    Prostate screen    HTN   controlled    Chronic neck pain   F/u Ken sahu MD and Dr Jose Corcoran for pain management

## 2017-05-23 NOTE — PATIENT INSTRUCTIONS
Schedule of Personalized Health Plan - male  (Provide Copy to Patient)  The best way to stay healthy is to live a healthy lifestyle. A healthy lifestyle includes regular exercise, eating a well-balanced diet, keeping a healthy weight and not smoking. Regular physical exams and screening tests are another important way to take care of yourself. Preventive exams provided by health care providers can find health problems early when treatment works best and can keep you from getting certain diseases or illnesses. Preventive services include exams, lab tests, screenings, shots, monitoring and information to help you take care of your own health. All people over 65 should have a pneumonia shot. Pneumonia shots are usually only needed once in a lifetime unless your doctor decides differently. All people over 65 should have a yearly flu shot. People over 65 are at medium to high risk for Hepatitis B. Three shots are needed for complete protection. In addition to your physical exam, some screening tests are recommended:    Bone mass measurement (dexa scan) is recommended every two years if you have certain risk factors, such as personal history of vertebral fracture or chronic steroid medication use    Diabetes Mellitus screening is recommended every year. Glaucoma is an eye disease caused by high pressure in the eye. An eye exam is recommended every year. Cardiovascular screening tests that check your cholesterol and other blood fat (lipid) levels are recommended every five years. Colorectal Cancer screening tests help to find pre-cancerous polyps (growths in the colon) so they can be removed before they turn into cancer. Tests ordered for screening depend on your personal and family history risk factors.     Screening for Prostate Cancer is recommended yearly with a digital rectal exam and/or a PSA test    Here is a list of your current Health Maintenance items with a due date:  Health Maintenance Due   Topic Date Due    DTaP/Tdap/Td series (1 - Tdap) 02/24/2009    MEDICARE YEARLY EXAM  04/23/2016

## 2017-05-23 NOTE — PROGRESS NOTES
Gita Liriano is a 49 y.o. male  Chief Complaint   Patient presents with    Complete Physical     1. Have you been to an emergency room, urgent clinic, or hospitalized since your last visit? NO  If yes, where when, and reason for visit? 2. Have seen or consulted any other health care provider since your last visit? YES  Please include any pap smears or colon screening in this section  If yes, where when, and reason for visit? [facility]: PTSD      6. Do you have an Advanced Directive/ Living Will in place?  NO  If yes, do we have a copy on file NO  If no, would you like information NO

## 2017-05-24 ENCOUNTER — HOSPITAL ENCOUNTER (OUTPATIENT)
Dept: PHYSICAL THERAPY | Age: 52
Discharge: HOME | End: 2017-05-24
Payer: MEDICARE

## 2017-05-24 LAB
CHOLEST SERPL-MCNC: 251 MG/DL (ref 100–199)
HDLC SERPL-MCNC: 51 MG/DL
LDLC SERPL CALC-MCNC: 149 MG/DL (ref 0–99)
PSA SERPL-MCNC: 2.4 NG/ML (ref 0–4)
TRIGL SERPL-MCNC: 255 MG/DL (ref 0–149)
VLDLC SERPL CALC-MCNC: 51 MG/DL (ref 5–40)

## 2017-05-24 PROCEDURE — 97110 THERAPEUTIC EXERCISES: CPT | Performed by: PHYSICAL THERAPIST

## 2017-05-24 PROCEDURE — 97140 MANUAL THERAPY 1/> REGIONS: CPT | Performed by: PHYSICAL THERAPIST

## 2017-05-24 PROCEDURE — 97012 MECHANICAL TRACTION THERAPY: CPT | Performed by: PHYSICAL THERAPIST

## 2017-05-24 NOTE — PROGRESS NOTES
PT DAILY TREATMENT NOTE - [facility] 2-15    Patient Name: Severiano Quiroz  Date:2017  : 1965  [x]  Patient  Verified  Payor: [facility] MEDICARE COMPLETE / Plan: [facility] MEDICARE COMPLETE / Product Type: Managed Care Medicare /    In time:12:35p Out time:1:30p  Total Treatment Time (min):55  Total Timed Codes (min): 45  1:1 Treatment Time ([address] only): 39  Visit #: 5    Treatment Area: Radiculopathy, cervical region [M54.12]    SUBJECTIVE  Pain Level (0-10 scale): 9/10  Any medication changes, allergies to medications, adverse drug reactions, diagnosis change, or new procedure performed?: [x] No    [] Yes (see summary sheet for update)  Subjective functional status/changes:   [] No changes reported  The pt reports he is hurting today and has been all week. Weather has to do something with it and always feel worse when it is raining. OBJECTIVE                Modality rationale: decrease pain and increase tissue extensibility to improve the patients ability to perform daily activities with less pain.     Min Type Additional Details   15 [] Estim: []Att []Unatt []TENS instruct  [x]IFC []Premod []NMES   []Other:  []w/US []w/ice [x]w/heat  Position: supine Location: cervical   12 [x] Traction: [x] Cervical []Lumbar  [] Prone [x]Supine  []Intermittent [x]Continuous Lbs:23lbs  [] before manual  [x] after manual  []w/heat      [] Ultrasound: []Continuous [] Pulsed at:  []1MHz []3MHz Location:  W/cm2:      [] Paraffin      Location:   []w/heat      [] Ice [] Heat  [] Ice massage Position:  Location:      [] Laser  [] Other: Position:  Location:      [] Vasopneumatic Device Pressure: [] lo [] med [] hi   Temperature:        [x] Skin assessment post-treatment: [x]intact []redness- no adverse reaction  []redness - adverse reaction:           20 min Therapeutic Exercise: See Flow Sheet   Rationale: decrease pain, increase ROM and increase tissue extensibility to improve the patients ability to perform IADLS with less difficulty.       20 min Manual Therapy: Manual Cervical distraction in 15-20 degrees flexion. STM to levator, UT, and scalenes,, SCM on the right. Rationale: decrease pain, increase ROM and increase tissue extensibility to improve the patients ability to perform IADLS with less difficulty.         With  [] TE  [] TA  [] neuro  [] other: Patient Education: [x] Review HEP   [] Progressed/Changed HEP based on:   [] positioning [] body mechanics [] transfers [] heat/ice application   [] other:                Other Objective/Functional Measures: -       Pain Level (0-10 scale) post treatment: 7/10       ASSESSMENT/Changes in Function:   Pt is feeling a lot of pain this week due to rainy weather. He has appointment with surgeon on June 8th. Patient will continue to benefit from skilled PT services to modify and progress therapeutic interventions, address functional mobility deficits, address ROM deficits, address strength deficits, analyze and address soft tissue restrictions, analyze and modify body mechanics/ergonomics, assess and modify postural abnormalities and instruct in home and community integration to attain remaining goals.      [x] See Plan of Care  [] See progress note/recertification  [] See Discharge Summary      Progress towards goals / Updated goals:      Short Term Goals: To be accomplished in 4 weeks:  1.) Pt will be independent with HEP in order to better manage s/s.   2.) Pt will report pain levels of 5/10 at rest for 4 sessions in order to improve tolerance to daily activities.       Long Term Goals: To be accomplished in 8 weeks:  1) Pt will have 20 degrees of cervical rotation to the right in order to improve driving confidence and performance  2) pt will have  strength on the right to 15 lbs  3) Pt will have pain levels to 2-3/10 at rest to improve tolerance to daily activities.            PLAN  [] Upgrade activities as tolerated [x] Continue plan of care  [] Update interventions per flow sheet   [] Discharge due to:_  [] Other:_     Filomena Miss 5/24/2017  1:25 PM

## 2017-05-29 ENCOUNTER — APPOINTMENT (OUTPATIENT)
Dept: PHYSICAL THERAPY | Age: 52
End: 2017-05-29
Payer: MEDICARE

## 2017-05-31 ENCOUNTER — HOSPITAL ENCOUNTER (OUTPATIENT)
Dept: PHYSICAL THERAPY | Age: 52
Discharge: HOME | End: 2017-05-31
Payer: MEDICARE

## 2017-05-31 PROCEDURE — 97012 MECHANICAL TRACTION THERAPY: CPT | Performed by: PHYSICAL THERAPIST

## 2017-05-31 PROCEDURE — 97140 MANUAL THERAPY 1/> REGIONS: CPT | Performed by: PHYSICAL THERAPIST

## 2017-05-31 NOTE — PROGRESS NOTES
PT DAILY TREATMENT NOTE - [facility] 2-15    Patient Name: Braeden Yanes  Date:2017  : 1965  [x]  Patient  Verified  Payor: [employer] MEDICARE COMPLETE / Plan: [facility] [employer] MEDICARE COMPLETE / Product Type: Managed Care Medicare /    In time:1:00pm  Out time:2:00p  Total Treatment Time (min): 60  Total Timed Codes (min): 60  1:1 Treatment Time ( only): 60  Visit #: 6    Treatment Area: Radiculopathy, cervical region [M54.12]    SUBJECTIVE  Pain Level (0-10 scale): 610  Any medication changes, allergies to medications, adverse drug reactions, diagnosis change, or new procedure performed?: [x] No    [] Yes (see summary sheet for update)  Subjective functional status/changes:   [] No changes reported  The pt is doing better and able to rotate head better since increasing the gym and trying his traction unit at home. The pt brought his traction unit today to improve comfort with set up. OBJECTIVE                Modality rationale: decrease pain and increase tissue extensibility to improve the patients ability to perform daily activities with less pain.     Min Type Additional Details   - [] Estim: []Att []Unatt []TENS instruct  [x]IFC []Premod []NMES   []Other:  []w/US []w/ice [x]w/heat  Position: supine Location: cervical   15 [x] Traction: [x] Cervical []Lumbar  [] Prone [x]Supine  []Intermittent [x]Continuous Lbs:23lbs  [] before manual  [x] after manual  []w/heat      [] Ultrasound: []Continuous [] Pulsed at:  []1MHz []3MHz Location:  W/cm2:      [] Paraffin      Location:   []w/heat      [] Ice [] Heat  [] Ice massage Position:  Location:      [] Laser  [] Other: Position:  Location:      [] Vasopneumatic Device Pressure: [] lo [] med [] hi   Temperature:        [x] Skin assessment post-treatment: [x]intact []redness- no adverse reaction  []redness - adverse reaction:           - min Therapeutic Exercise: See Flow Sheet   Rationale: decrease pain, increase ROM and increase tissue extensibility to improve the patients ability to perform IADLS with less difficulty.     30 min Manual Therapy: Manual Cervical distraction in 20 degrees flexion. STM to levator, UT, and scalenes,, SCM on the right. Rationale: decrease pain, increase ROM and increase tissue extensibility to improve the patients ability to perform IADLS with less difficulty.         With  [] TE  [] TA  [] neuro  [] other: Patient Education: [x] Review HEP   [] Progressed/Changed HEP based on:   [] positioning [] body mechanics [] transfers [] heat/ice application   [] other:            Other Objective/Functional Measures: -       Pain Level (0-10 scale) post treatment: 5/10       ASSESSMENT/Changes in Function:   Pt is feeling better this week and able to perform home traction unit 1x since last session and felt like it made a difference. He has appointment with surgeon on June 8th. Patient will continue to benefit from skilled PT services to modify and progress therapeutic interventions, address functional mobility deficits, address ROM deficits, address strength deficits, analyze and address soft tissue restrictions, analyze and modify body mechanics/ergonomics, assess and modify postural abnormalities and instruct in home and community integration to attain remaining goals.      [x] See Plan of Care  [] See progress note/recertification  [] See Discharge Summary      Progress towards goals / Updated goals:      Short Term Goals: To be accomplished in 4 weeks:  1.) Pt will be independent with HEP in order to better manage s/s. MET  2.) Pt will report pain levels of 5/10 at rest for 4 sessions in order to improve tolerance to daily activities.        Long Term Goals:  To be accomplished in 8 weeks:  1) Pt will have 20 degrees of cervical rotation to the right in order to improve driving confidence and performance  2) pt will have  strength on the right to 15 lbs  3) Pt will have pain levels to 2-3/10 at rest to improve tolerance to daily activities.            PLAN  [] Upgrade activities as tolerated [x] Continue plan of care  [] Update interventions per flow sheet   [] Discharge due to:_  [] Other:_       Oleg Ferris 5/31/2017  2:23 PM

## 2017-06-07 ENCOUNTER — HOSPITAL ENCOUNTER (OUTPATIENT)
Dept: PHYSICAL THERAPY | Age: 52
Discharge: HOME | End: 2017-06-07
Payer: MEDICARE

## 2017-06-07 PROCEDURE — 97140 MANUAL THERAPY 1/> REGIONS: CPT | Performed by: PHYSICAL THERAPIST

## 2017-06-07 PROCEDURE — 97110 THERAPEUTIC EXERCISES: CPT | Performed by: PHYSICAL THERAPIST

## 2017-06-07 NOTE — PROGRESS NOTES
PT DAILY TREATMENT NOTE - [facility] 2-15    Patient Name: Segun Muse  Date:2017  : 1965  [x]  Patient  Verified  Payor: [facility] MEDICARE COMPLETE / Plan: [facility] MEDICARE COMPLETE / Product Type: Managed Care Medicare /    In time:1:15p Out time: 1:45p  Total Treatment Time (min): 30  Total Timed Codes (min): 30  1:1 Treatment Time [facility] only):30  Visit #: 7    Treatment Area: Radiculopathy, cervical region [M54.12]    SUBJECTIVE  Pain Level (0-10 scale): 910  Any medication changes, allergies to medications, adverse drug reactions, diagnosis change, or new procedure performed?: [x] No    [] Yes (see summary sheet for update)  Subjective functional status/changes:   [] No changes reported  The pt reports he is not doing too bad, able to perform home traction unit 2 x since last session and feels like it did given him temporary relief. OBJECTIVE                Modality rationale: decrease pain and increase tissue extensibility to improve the patients ability to perform daily activities with less pain.     Min Type Additional Details   - [] Estim: []Att []Unatt []TENS instruct  [x]IFC []Premod []NMES   []Other:  []w/US []w/ice [x]w/heat  Position: supine Location: cervical   - [x] Traction: [x] Cervical []Lumbar  [] Prone [x]Supine  []Intermittent [x]Continuous Lbs:23lbs  [] before manual  [x] after manual  []w/heat      [] Ultrasound: []Continuous [] Pulsed at:  []1MHz []3MHz Location:  W/cm2:      [] Paraffin      Location:   []w/heat      [] Ice [] Heat  [] Ice massage Position:  Location:      [] Laser  [] Other: Position:  Location:      [] Vasopneumatic Device Pressure: [] lo [] med [] hi   Temperature:        [x] Skin assessment post-treatment: [x]intact []redness- no adverse reaction  []redness - adverse reaction:           10 min Therapeutic Exercise: See Flow Sheet   Rationale: decrease pain, increase ROM and increase tissue extensibility to improve the patients ability to perform IADLS with less difficulty.       20 min Manual Therapy: Manual Cervical distraction in 20 degrees flexion. STM to levator, UT, and scalenes,, SCM on the right. Rationale: decrease pain, increase ROM and increase tissue extensibility to improve the patients ability to perform IADLS with less difficulty.         With  [] TE  [] TA  [] neuro  [] other: Patient Education: [x] Review HEP   [] Progressed/Changed HEP based on:   [] positioning [] body mechanics [] transfers [] heat/ice application   [] other:            Other Objective/Functional Measures: -       Pain Level (0-10 scale) post treatment: 6/10       ASSESSMENT/Changes in Function:   Pt is feeling better this week and able to perform home traction unit 2x since last session and felt like it made a difference. Encouraged to do 3x until next week. He has appointment with surgeon tomorrow June 8th. Patient will continue to benefit from skilled PT services to modify and progress therapeutic interventions, address functional mobility deficits, address ROM deficits, address strength deficits, analyze and address soft tissue restrictions, analyze and modify body mechanics/ergonomics, assess and modify postural abnormalities and instruct in home and community integration to attain remaining goals.      [x] See Plan of Care  [] See progress note/recertification  [] See Discharge Summary      Progress towards goals / Updated goals:      Short Term Goals: To be accomplished in 4 weeks:  1.) Pt will be independent with HEP in order to better manage s/s. MET  2.) Pt will report pain levels of 5/10 at rest for 4 sessions in order to improve tolerance to daily activities.       Long Term Goals:  To be accomplished in 8 weeks:  1) Pt will have 20 degrees of cervical rotation to the right in order to improve driving confidence and performance  2) pt will have  strength on the right to 15 lbs  3) Pt will have pain levels to 2-3/10 at rest to improve tolerance to daily activities.            PLAN  [] Upgrade activities as tolerated [x] Continue plan of care  [] Update interventions per flow sheet   [] Discharge due to:_  [] Other:_     Clementina Carr 6/7/2017  2:21 PM

## 2017-06-13 ENCOUNTER — APPOINTMENT (OUTPATIENT)
Dept: PHYSICAL THERAPY | Age: 52
End: 2017-06-13
Payer: MEDICARE

## 2017-06-15 ENCOUNTER — HOSPITAL ENCOUNTER (OUTPATIENT)
Dept: PHYSICAL THERAPY | Age: 52
Discharge: HOME | End: 2017-06-15
Payer: MEDICARE

## 2017-06-15 PROCEDURE — 97110 THERAPEUTIC EXERCISES: CPT | Performed by: PHYSICAL THERAPIST

## 2017-06-15 PROCEDURE — 97140 MANUAL THERAPY 1/> REGIONS: CPT | Performed by: PHYSICAL THERAPIST

## 2017-06-15 PROCEDURE — 97012 MECHANICAL TRACTION THERAPY: CPT | Performed by: PHYSICAL THERAPIST

## 2017-06-15 NOTE — PROGRESS NOTES
PT DAILY TREATMENT NOTE - [facility] 2-15    Patient Name: Janel Picket  Date:6/15/2017  : 1965  [x]  Patient  Verified  Payor: [employer] MEDICARE COMPLETE / Plan: [facility] MEDICARE COMPLETE / Product Type: Managed Care Medicare /    In time:12:35pOut time: 1:30p  Total Treatment Time (min): 55  Total Timed Codes (min): 55  1:1 Treatment Time ([address] only): 54  Visit #: 8    Treatment Area: Radiculopathy, cervical region [M54.12]    SUBJECTIVE  Pain Level (0-10 scale): 8/10  Any medication changes, allergies to medications, adverse drug reactions, diagnosis change, or new procedure performed?: [x] No    [] Yes (see summary sheet for update)  Subjective functional status/changes:   [] No changes reported  Pt reports having a rough week and has more irritation into his right arm specifically his pinky    OBJECTIVE                Modality rationale: decrease pain and increase tissue extensibility to improve the patients ability to perform daily activities with less pain. Min Type Additional Details   - [] Estim: []Att []Unatt []TENS instruct  [x]IFC []Premod []NMES   []Other:  []w/US []w/ice [x]w/heat  Position: supine Location: cervical   15 [x] Traction: [x] Cervical []Lumbar  [] Prone [x]Supine  []Intermittent [x]Continuous Lbs:25lbs at 20 degrees  [] before manual  [x] after manual  [x]w/heat      [] Ultrasound: []Continuous [] Pulsed at:  []1MHz []3MHz Location:  W/cm2:      [] Paraffin      Location:   []w/heat      [] Ice [] Heat  [] Ice massage Position:  Location:      [] Laser  [] Other: Position:  Location:      [] Vasopneumatic Device Pressure: [] lo [] med [] hi   Temperature:        [x] Skin assessment post-treatment: [x]intact []redness- no adverse reaction  []redness - adverse reaction:           20 min Therapeutic Exercise: See Flow Sheet   Rationale: decrease pain, increase ROM and increase tissue extensibility to improve the patients ability to perform IADLS with less difficulty.        30 min Manual Therapy: Manual Cervical distraction in 20 degrees flexion. STM to levator, UT, and scalenes,, SCM on the right. Rationale: decrease pain, increase ROM and increase tissue extensibility to improve the patients ability to perform IADLS with less difficulty.         With  [] TE  [] TA  [] neuro  [] other: Patient Education: [x] Review HEP   [] Progressed/Changed HEP based on:   [] positioning [] body mechanics [] transfers [] heat/ice application   [] other:            Other Objective/Functional Measures: -       Pain Level (0-10 scale) post treatment: 6/10       ASSESSMENT/Changes in Function:   Pt reports he is feeling more numbness in his right arm/pinky this week. Has facet joint injection this afternoon. Will continue for 2 months to see if this helps and will make a decision on surgery at that time. Pt reported good relief with manual and traction today. Patient will continue to benefit from skilled PT services to modify and progress therapeutic interventions, address functional mobility deficits, address ROM deficits, address strength deficits, analyze and address soft tissue restrictions, analyze and modify body mechanics/ergonomics, assess and modify postural abnormalities and instruct in home and community integration to attain remaining goals.      [x] See Plan of Care  [] See progress note/recertification  [] See Discharge Summary      Progress towards goals / Updated goals:      Short Term Goals: To be accomplished in 4 weeks:  1.) Pt will be independent with HEP in order to better manage s/s. MET  2.) Pt will report pain levels of 5/10 at rest for 4 sessions in order to improve tolerance to daily activities.       Long Term Goals:  To be accomplished in 8 weeks:  1) Pt will have 20 degrees of cervical rotation to the right in order to improve driving confidence and performance  2) pt will have  strength on the right to 15 lbs  3) Pt will have pain levels to 2-3/10 at rest to improve tolerance to daily activities.            PLAN  [] Upgrade activities as tolerated [x] Continue plan of care  [] Update interventions per flow sheet   [] Discharge due to:_  [] Other:_        Jeremie Lopez 6/15/2017  12:37 PM

## 2017-06-22 ENCOUNTER — HOSPITAL ENCOUNTER (OUTPATIENT)
Dept: PHYSICAL THERAPY | Age: 52
Discharge: HOME | End: 2017-06-22
Payer: MEDICARE

## 2017-06-22 PROCEDURE — 97110 THERAPEUTIC EXERCISES: CPT | Performed by: PHYSICAL THERAPIST

## 2017-06-22 PROCEDURE — 97012 MECHANICAL TRACTION THERAPY: CPT | Performed by: PHYSICAL THERAPIST

## 2017-06-22 PROCEDURE — 97140 MANUAL THERAPY 1/> REGIONS: CPT | Performed by: PHYSICAL THERAPIST

## 2017-06-22 NOTE — PROGRESS NOTES
PT DAILY TREATMENT NOTE - [facility] 2-15    Patient Name: Gualberto Payor  Date:2017  : 1965  [x]  Patient  Verified  Payor: Kaylah Xiao / Plan: [facility] MEDICARE COMPLETE / Product Type: Managed Care Medicare /    In time: 12:35p  Out time: 1:30p  Total Treatment Time (min): 55  Total Timed Codes (min):55  1:1 Treatment Time ([address] only): 39  Visit #: 9    Treatment Area: Radiculopathy, cervical region [M54.12]    SUBJECTIVE  Pain Level (0-10 scale): 7/10  Any medication changes, allergies to medications, adverse drug reactions, diagnosis change, or new procedure performed?: [x] No    [] Yes (see summary sheet for update)  Subjective functional status/changes:   [] No changes reported  The pt reports that he had an injection in his cervical spine on Tuesday. May feel a little different in his pinky on his right hand and index finger on his left hand. OBJECTIVE                Modality rationale: decrease pain and increase tissue extensibility to improve the patients ability to perform daily activities with less pain.     Min Type Additional Details   - [] Estim: []Att []Unatt []TENS instruct  [x]IFC []Premod []NMES   []Other:  []w/US []w/ice [x]w/heat  Position: supine Location: cervical   15 [x] Traction: [x] Cervical []Lumbar  [] Prone [x]Supine  [x]Intermittent []Continuous Lbs:25lbs at 20 degrees  [] before manual  [x] after manual  [x]w/heat      [] Ultrasound: []Continuous [] Pulsed at:  []1MHz []3MHz Location:  W/cm2:      [] Paraffin      Location:   []w/heat      [] Ice [] Heat  [] Ice massage Position:  Location:      [] Laser  [] Other: Position:  Location:      [] Vasopneumatic Device Pressure: [] lo [] med [] hi   Temperature:        [x] Skin assessment post-treatment: [x]intact []redness- no adverse reaction  []redness - adverse reaction:           20 min Therapeutic Exercise: See Flow Sheet   Rationale: decrease pain, increase ROM and increase tissue extensibility to improve the patients ability to perform IADLS with less difficulty.       20 min Manual Therapy: Manual Cervical distraction in 20 degrees flexion. STM to levator, UT, and scalenes,, SCM on the right. Rationale: decrease pain, increase ROM and increase tissue extensibility to improve the patients ability to perform IADLS with less difficulty.         With  [] TE  [] TA  [] neuro  [] other: Patient Education: [x] Review HEP   [] Progressed/Changed HEP based on:   [] positioning [] body mechanics [] transfers [] heat/ice application   [] other:            Other Objective/Functional Measures: -       Pain Level (0-10 scale) post treatment: 6/10       ASSESSMENT/Changes in Function:   Pt reports he may have decreased numbness into his right pinky and left 2nd digit after 1st injection performed on Tuesday. Next injection scheduled next Tuesday. Patient will continue to benefit from skilled PT services to modify and progress therapeutic interventions, address functional mobility deficits, address ROM deficits, address strength deficits, analyze and address soft tissue restrictions, analyze and modify body mechanics/ergonomics, assess and modify postural abnormalities and instruct in home and community integration to attain remaining goals.      [x] See Plan of Care  [] See progress note/recertification  [] See Discharge Summary      Progress towards goals / Updated goals:      Short Term Goals: To be accomplished in 4 weeks:  1.) Pt will be independent with HEP in order to better manage s/s. MET  2.) Pt will report pain levels of 5/10 at rest for 4 sessions in order to improve tolerance to daily activities.       Long Term Goals: To be accomplished in 8 weeks:  1) Pt will have 20 degrees of cervical rotation to the right in order to improve driving confidence and performance  2) pt will have  strength on the right to 15 lbs  3) Pt will have pain levels to 2-3/10 at rest to improve tolerance to daily activities.         PLAN  [] Upgrade activities as tolerated [x] Continue plan of care  [] Update interventions per flow sheet   [] Discharge due to:_  [] Other:_     Suzette Sheets 6/22/2017  3:22 PM

## 2017-06-27 ENCOUNTER — HOSPITAL ENCOUNTER (OUTPATIENT)
Dept: PHYSICAL THERAPY | Age: 52
Discharge: HOME | End: 2017-06-27
Payer: MEDICARE

## 2017-06-27 PROCEDURE — G8979 MOBILITY GOAL STATUS: HCPCS | Performed by: PHYSICAL THERAPIST

## 2017-06-27 PROCEDURE — 97140 MANUAL THERAPY 1/> REGIONS: CPT | Performed by: PHYSICAL THERAPIST

## 2017-06-27 PROCEDURE — G8978 MOBILITY CURRENT STATUS: HCPCS | Performed by: PHYSICAL THERAPIST

## 2017-06-27 PROCEDURE — 97012 MECHANICAL TRACTION THERAPY: CPT | Performed by: PHYSICAL THERAPIST

## 2017-06-27 NOTE — PROGRESS NOTES
PT DAILY TREATMENT NOTE - [facility] 2-15    Patient Name: Konstantin Slaughter  Date:2017  : 1965  [x]  Patient  Verified  Payor: [employer] MEDICARE COMPLETE / Plan: [facility] MEDICARE COMPLETE / Product Type: Managed Care Medicare /    In time: 12:30p  Out time:1:30p  Total Treatment Time (min): 60  Total Timed Codes (min): 60  1:1 Treatment Time ([address] only): 39  Visit #: 10    Treatment Area: Radiculopathy, cervical region [M54.12]    SUBJECTIVE  Pain Level (0-10 scale): 710  Any medication changes, allergies to medications, adverse drug reactions, diagnosis change, or new procedure performed?: [x] No    [] Yes (see summary sheet for update)  Subjective functional status/changes:   [] No changes reported  Doing okay. Frustrated about paper work sent to him. OBJECTIVE                Modality rationale: decrease pain and increase tissue extensibility to improve the patients ability to perform daily activities with less pain.     Min Type Additional Details   - [] Estim: []Att []Unatt []TENS instruct  [x]IFC []Premod []NMES   []Other:  []w/US []w/ice [x]w/heat  Position: supine Location: cervical   15 [x] Traction: [x] Cervical []Lumbar  [] Prone [x]Supine  [x]Intermittent []Continuous Lbs:25lbs at 20 degrees  [] before manual  [x] after manual  [x]w/heat      [] Ultrasound: []Continuous [] Pulsed at:  []1MHz []3MHz Location:  W/cm2:      [] Paraffin      Location:   []w/heat      [] Ice [] Heat  [] Ice massage Position:  Location:      [] Laser  [] Other: Position:  Location:      [] Vasopneumatic Device Pressure: [] lo [] med [] hi   Temperature:        [x] Skin assessment post-treatment: [x]intact []redness- no adverse reaction  []redness - adverse reaction:           20 min Therapeutic Exercise: See Flow Sheet   Rationale: decrease pain, increase ROM and increase tissue extensibility to improve the patients ability to perform IADLS with less difficulty.       20 min Manual Therapy: Manual Cervical distraction in 20 degrees flexion. STM to levator, UT, and scalenes,, SCM on the right. Rationale: decrease pain, increase ROM and increase tissue extensibility to improve the patients ability to perform IADLS with less difficulty.         With  [] TE  [] TA  [] neuro  [] other: Patient Education: [x] Review HEP   [] Progressed/Changed HEP based on:   [] positioning [] body mechanics [] transfers [] heat/ice application   [] other:            Other Objective/Functional Measures: -       Pain Level (0-10 scale) post treatment: 6/10       ASSESSMENT/Changes in Function:   Pt reports he gets his injection next week. Every 2 weeks. Feels relief from manual traction. Patient will continue to benefit from skilled PT services to modify and progress therapeutic interventions, address functional mobility deficits, address ROM deficits, address strength deficits, analyze and address soft tissue restrictions, analyze and modify body mechanics/ergonomics, assess and modify postural abnormalities and instruct in home and community integration to attain remaining goals.      [x] See Plan of Care  [] See progress note/recertification  [] See Discharge Summary      Progress towards goals / Updated goals:      Short Term Goals: To be accomplished in 4 weeks:  1.) Pt will be independent with HEP in order to better manage s/s. MET  2.) Pt will report pain levels of 5/10 at rest for 4 sessions in order to improve tolerance to daily activities. NOT MET       Long Term Goals: To be accomplished in 8 weeks:  1) Pt will have 20 degrees of cervical rotation to the right in order to improve driving confidence and performance progressing  2) pt will have  strength on the right to 15 lbs  progresing   3) Pt will have pain levels to 2-3/10 at rest to improve tolerance to daily activities.  NOT MET        PLAN  [] Upgrade activities as tolerated [x] Continue plan of care  [] Update interventions per flow sheet   [] Discharge due to:_  [] Other:_ Reyes Chance 6/27/2017  3:28 PM

## 2017-06-29 ENCOUNTER — HOSPITAL ENCOUNTER (OUTPATIENT)
Dept: PHYSICAL THERAPY | Age: 52
Discharge: HOME | End: 2017-06-29
Payer: MEDICARE

## 2017-06-29 PROCEDURE — 97140 MANUAL THERAPY 1/> REGIONS: CPT | Performed by: PHYSICAL THERAPIST

## 2017-06-29 PROCEDURE — G8979 MOBILITY GOAL STATUS: HCPCS | Performed by: PHYSICAL THERAPIST

## 2017-06-29 PROCEDURE — G8978 MOBILITY CURRENT STATUS: HCPCS | Performed by: PHYSICAL THERAPIST

## 2017-06-29 NOTE — PROGRESS NOTES
[facility]   [address] [address] [place], [address] [place] [address]  Phone: [phone] Fax: [phone]    Progress Note    Name: Shai Kessler   : 1965   MD: MARI Arnold       Treatment Diagnosis: Radiculopathy, cervical region [M54.12]  Start of Care: 17    Visits from Start of Care: 11  Missed Visits: 7    Summary of Care: The pt has been seeing 11 sessions and is making good gains with improvement in right  strength, cervical ROM, and strength of right UE. He did however present with decreased strength of his left UE at re-eval and decreased  strength on the left. FOTO score decreased despite good gains in impairments. Pain levels have gradually decreased, but continues to range from 6-10/10. We plan to continue to address pain, soft tissue restriction, nerve tension, and radicular s/s with manual therapy and modalities. He will continue to benefit from gradual strengthening of UE and postural muscles. Assessment / Recommendations: Progress 2-4 more weeks. Will transfer to [facility]. Progress towards goals / Updated goals:      Short Term Goals: To be accomplished in 4 weeks:  1.) Pt will be independent with HEP in order to better manage s/s. MET  2.) Pt will report pain levels of 5/10 at rest for 4 sessions in order to improve tolerance to daily activities. Partially MET      Long Term Goals: To be accomplished in 8 weeks:  1) Pt will have 20 degrees of cervical rotation to the right in order to improve driving confidence and performance progressing. MET  2) pt will have  strength on the right to 15 lbs  MET  3) Pt will have pain levels to 2-3/10 at rest to improve tolerance to daily activities. NOT MET      Other: progress 4 more weeks. G-Codes (GP)  Mobility  [id number] Current  CK= 40-59%  [id number] Goal  CJ= 20-39%        The severity rating is based on the FOTO Score and clinical judgement.     Burgess Grimaldo 2017 3:31 PM    ________________________________________________________________________  NOTE TO PHYSICIAN:  Please complete the following and fax to: [facility]: Fax: [phone]. Pdamini Donnelly Retain this original for your records. If you are unable to process this request in 24 hours, please contact our office.        ____ I have read the above report and request that my patient continue therapy with the following changes/special instructions:  ____ I have read the above report and request that my patient be discharged from therapy    Physician's Signature:_________________ Date:___________Time:__________

## 2017-06-29 NOTE — PROGRESS NOTES
PT DAILY TREATMENT NOTE - [facility] 2-15    Patient Name: Stephanie Luo  Date:2017  : 1965  [x]  Patient  Verified  Payor: Leighton Najera / Plan: [facility] MEDICARE COMPLETE / Product Type: Managed Care Medicare /    In time: 12:30p   Out time: 1:00p  Total Treatment Time (min): 30  Total Timed Codes (min): 30  1:1 Treatment Time ([address] only): 30  Visit #: 11    Treatment Area: Radiculopathy, cervical region [M54.12]    SUBJECTIVE  Pain Level (0-10 scale): 6/10  Any medication changes, allergies to medications, adverse drug reactions, diagnosis change, or new procedure performed?: [x] No    [] Yes (see summary sheet for update)  Subjective functional status/changes:   [] No changes reported  I think its doing better. Less numbness into right pinky and 4th digit. OBJECTIVE                Modality rationale: decrease pain and increase tissue extensibility to improve the patients ability to perform daily activities with less pain. Min Type Additional Details   - [] Estim: []Att []Unatt []TENS instruct  [x]IFC []Premod []NMES   []Other:  []w/US []w/ice [x]w/heat  Position: supine Location: cervical   - [x] Traction: [x] Cervical []Lumbar  [] Prone [x]Supine  [x]Intermittent []Continuous Lbs:25lbs at 20 degrees  [] before manual  [x] after manual  [x]w/heat      [] Ultrasound: []Continuous [] Pulsed at:  []1MHz []3MHz Location:  W/cm2:      [] Paraffin      Location:   []w/heat      [] Ice [] Heat  [] Ice massage Position:  Location:      [] Laser  [] Other: Position:  Location:      [] Vasopneumatic Device Pressure: [] lo [] med [] hi   Temperature:        [x] Skin assessment post-treatment: [x]intact []redness- no adverse reaction  []redness - adverse reaction:           - min Therapeutic Exercise: See Flow Sheet   Rationale: decrease pain, increase ROM and increase tissue extensibility to improve the patients ability to perform IADLS with less difficulty.        30 min Manual Therapy: Manual Cervical distraction in 20 degrees flexion. STM to levator, UT, and scalenes, SCM on the right. PROM into flexion, SB rotation. Nerve glides for ulnar, radial and median. Rationale: decrease pain, increase ROM and increase tissue extensibility to improve the patients ability to perform IADLS with less difficulty.         With  [] TE  [] TA  [] neuro  [] other: Patient Education: [x] Review HEP   [] Progressed/Changed HEP based on:   [] positioning [] body mechanics [] transfers [] heat/ice application   [] other:            Other Objective/Functional Measures:      strength:  18.5 Right (inital eval 8.6lbs) 28.5 left (inital eval 45.3lbs!)    Cervical ROM:                                                       Initial EVAL   Current                                                                                   R                    L              R  L                              Flexion                                                                   10                      -               20                              Extension                                                              15                       -              30                              Side Bending                                                         7                        20            15                  25          Rotation                                                                 20                      50            45                  55                  MUSCLE STRENGTH     R  L     C4 Sh Elev  4  4  C5 Deltoid/Biceps 4  4  C6 Wrist Ext  4  4+  C7 Triceps  4  4+  C8 Thumb Ext  -  -  T1 Hand Inst  4-  4        Pain Level (0-10 scale) post treatment: 5/10       ASSESSMENT/Changes in Function:   Pt has improved with ROM, strength, and  strength gains. His pain levels have decreased overall and feeling some relief from both PT and first cervical injection. FOTO score however decreased by 10 points.  Will continue to address ROM limitations and cervical radiculopathy s/s. Patient will continue to benefit from skilled PT services to modify and progress therapeutic interventions, address functional mobility deficits, address ROM deficits, address strength deficits, analyze and address soft tissue restrictions, analyze and modify body mechanics/ergonomics, assess and modify postural abnormalities and instruct in home and community integration to attain remaining goals.      [x] See Plan of Care  [] See progress note/recertification  [] See  DIscharge                                                                                                                                                                                                                     Progress towards goals / Updated goals:      Short Term Goals: To be accomplished in 4 weeks:  1.) Pt will be independent with HEP in order to better manage s/s. MET  2.) Pt will report pain levels of 5/10 at rest for 4 sessions in order to improve tolerance to daily activities. Partially MET      Long Term Goals: To be accomplished in 8 weeks:  1) Pt will have 20 degrees of cervical rotation to the right in order to improve driving confidence and performance progressing. MET  2) pt will have  strength on the right to 15 lbs  MET  3) Pt will have pain levels to 2-3/10 at rest to improve tolerance to daily activities.  NOT MET        PLAN  [] Upgrade activities as tolerated [x] Continue plan of care  [] Update interventions per flow sheet   [] Discharge due to:_  [] Other:_     Lorrie Garcia 6/29/2017  12:59 PM

## 2017-07-04 ENCOUNTER — APPOINTMENT (OUTPATIENT)
Dept: PHYSICAL THERAPY | Age: 52
End: 2017-07-04

## 2017-07-06 ENCOUNTER — APPOINTMENT (OUTPATIENT)
Dept: PHYSICAL THERAPY | Age: 52
End: 2017-07-06

## 2017-07-20 ENCOUNTER — APPOINTMENT (OUTPATIENT)
Dept: PHYSICAL THERAPY | Age: 52
End: 2017-07-20

## 2017-08-10 ENCOUNTER — OFFICE VISIT (OUTPATIENT)
Dept: INTERNAL MEDICINE CLINIC | Age: 52
End: 2017-08-10

## 2017-08-10 VITALS
BODY MASS INDEX: 22.98 KG/M2 | RESPIRATION RATE: 16 BRPM | DIASTOLIC BLOOD PRESSURE: 104 MMHG | OXYGEN SATURATION: 99 % | WEIGHT: 173.4 LBS | HEART RATE: 78 BPM | SYSTOLIC BLOOD PRESSURE: 153 MMHG | TEMPERATURE: 98.5 F | HEIGHT: 73 IN

## 2017-08-10 DIAGNOSIS — J06.9 ACUTE URI: Primary | ICD-10-CM

## 2017-08-10 RX ORDER — AMOXICILLIN AND CLAVULANATE POTASSIUM 875; 125 MG/1; MG/1
1 TABLET, FILM COATED ORAL EVERY 12 HOURS
Qty: 14 TAB | Refills: 0 | Status: SHIPPED | OUTPATIENT
Start: 2017-08-10 | End: 2017-08-17

## 2017-08-10 RX ORDER — CODEINE PHOSPHATE AND GUAIFENESIN 10; 100 MG/5ML; MG/5ML
5 SOLUTION ORAL
Qty: 120 ML | Refills: 0 | Status: SHIPPED | OUTPATIENT
Start: 2017-08-10 | End: 2017-10-05 | Stop reason: ALTCHOICE

## 2017-08-10 NOTE — MR AVS SNAPSHOT
Visit Information Date & Time Provider Department Dept. Phone Encounter #  
 8/10/2017 11:45 AM Bettina Gama, [address] [place] [address],[address] [phone] [id number] Follow-up Instructions Return if symptoms worsen or fail to improve. Follow-up and Disposition History Upcoming Health Maintenance Date Due DTaP/Tdap/Td series (1 - Tdap) 2/24/2009 INFLUENZA AGE 9 TO ADULT 8/1/2017 MEDICARE YEARLY EXAM 5/24/2018 COLONOSCOPY 9/30/2020 Allergies as of 8/10/2017  Review Complete On: 8/10/2017 By: Bettina Gama MD  
  
 Severity Noted Reaction Type Reactions Morphine  05/04/2013    Itching Current Immunizations  Reviewed on 12/6/2012 Name Date Influenza Vaccine 11/14/2014, 12/6/2012 11:16 AM  
 TB Skin Test (PPD) Intradermal 1/9/2015 Td 2/23/2009 Not reviewed this visit You Were Diagnosed With   
  
 Codes Comments Acute URI    -  Primary ICD-10-CM: J06.9 ICD-9-CM: 465.9 Vitals BP Pulse Temp Resp Height(growth percentile) Weight(growth percentile) (!) 153/104 (BP 1 Location: Left arm, BP Patient Position: Sitting) 78 98.5 °F (36.9 °C) (Oral) 16 6' 1\" (1.854 m) 173 lb 6.4 oz (78.7 kg) SpO2 BMI Smoking Status 99% 22.88 kg/m2 Former Smoker Vitals History BMI and BSA Data Body Mass Index Body Surface Area  
 22.88 kg/m 2 2.01 m 2 Preferred Pharmacy Pharmacy Name Phone [facility] [address] [phone] Your Updated Medication List  
  
   
This list is accurate as of: 8/10/17 12:18 PM.  Always use your most recent med list. amLODIPine 10 mg tablet Commonly known as:  Priya Heather Take 1 Tab by mouth daily. amoxicillin-clavulanate 875-125 mg per tablet Commonly known as:  AUGMENTIN Take 1 Tab by mouth every twelve (12) hours for 7 days. aspirin 81 mg tablet Take 1 Tab by mouth daily. buPROPion  mg tablet Commonly known as:  Vinod Butt Take 1 Tab by mouth every morning. cholecalciferol (VITAMIN D3) 5,000 unit Tab tablet Commonly known as:  VITAMIN D3 Take 10,000 Int'l Units by mouth daily. Indications: VITAMIN D DEFICIENCY  
  
 cloNIDine HCl 0.1 mg tablet Commonly known as:  CATAPRES Take 0.1 mg by mouth every evening. cyclobenzaprine 5 mg tablet Commonly known as:  FLEXERIL  
  
 gabapentin 400 mg capsule Commonly known as:  NEURONTIN Take 400 mg by mouth three (3) times daily. guaiFENesin-codeine 100-10 mg/5 mL solution Commonly known as:  ROBITUSSIN AC Take 5 mL by mouth three (3) times daily as needed for Cough. Max Daily Amount: 15 mL. HYDROcodone-acetaminophen 5-325 mg per tablet Commonly known as:  [address] Take 1 Tab by mouth every six (6) hours as needed. methocarbamol 750 mg tablet Commonly known as:  ROBAXIN Take  by mouth three (3) times daily. multivitamin tablet Commonly known as:  ONE A DAY Take 1 Tab by mouth daily. nabumetone 500 mg tablet Commonly known as:  RELAFEN Take  by mouth two (2) times a day. pyridoxine (vitamin B6) 100 mg tablet Commonly known as:  VITAMIN B-6 Take 1 Tab by mouth daily. sertraline 100 mg tablet Commonly known as:  ZOLOFT Take 1/2 tablet by mouth daily x 1 week and then take 1 whole tablet daily. tadalafil 20 mg tablet Commonly known as:  CIALIS  
TAKE 1 TABLET BY MOUTH AS NEEDED  
  
 tiZANidine 2 mg tablet Commonly known as:  [place] TAKE 1 TO 2 TABLETS PO THREE TIMES A DAY AS NEEDED FOR SPASMS Prescriptions Printed Refills  
 guaiFENesin-codeine (ROBITUSSIN AC) 100-10 mg/5 mL solution 0 Sig: Take 5 mL by mouth three (3) times daily as needed for Cough. Max Daily Amount: 15 mL. Class: Print Route: Oral  
  
Prescriptions Sent to Pharmacy Refills amoxicillin-clavulanate (AUGMENTIN) 875-125 mg per tablet 0 Sig: Take 1 Tab by mouth every twelve (12) hours for 7 days. Class: Normal  
 Pharmacy: [facility] [address] [place] [address] #: [phone] Route: Oral  
  
Follow-up Instructions Return if symptoms worsen or fail to improve. Introducing hospitals & HEALTH SERVICES! Dear Alonzo Shaw: 
Thank you for requesting a [employer] account. Our records indicate that you already have an active [employer] account. You can access your account anytime at [email or web address] Did you know that you can access your hospital and ER discharge instructions at any time in [employer] You can also review all of your test results from your hospital stay or ER visit. Additional Information If you have questions, please visit the Frequently Asked Questions section of the [employer] website at [email or web address]. Remember, [employer] is NOT to be used for urgent needs. For medical emergencies, dial 911. Now available from your iPhone and Android! Please provide this summary of care documentation to your next provider. Your primary care clinician is listed as Chris BURDICK. If you have any questions after today's visit, please call [phone].

## 2017-08-10 NOTE — PROGRESS NOTES
SUBJECTIVE  Mr. Yesi Lewis presents today acutely for     Chief Complaint   Patient presents with    URI     pt here today for URI- pt c.o chest congestion, sneezing, and fever/body aches at night x 1 week       \"It's really nasty. \"   \"About two weeks now. \"   Low grade fever in last two days. Coughing up yellow sputum. OBJECTIVE  Visit Vitals    BP (!) 153/104 (BP 1 Location: Left arm, BP Patient Position: Sitting)    Pulse 78    Temp 98.5 °F (36.9 °C) (Oral)    Resp 16    Ht 6' 1\" (1.854 m)    Wt 173 lb 6.4 oz (78.7 kg)    SpO2 99%    BMI 22.88 kg/m2     Gen: Pleasant 49 y.o.  male in NAD.   HEENT: PERRLA. EOMI. OP moist and rather erythematous in posterior pharynx.  Neck: Supple.  No LAD.  HEART: RRR, No M/G/R.   LUNGS: CTAB No W/R.   ABDOMEN: S, NT, ND, BS+.   EXTREMITIES: Warm. No C/C/E. SKIN: Warm. Dry. No rashes or other lesions noted. ASSESSMENT / PLAN    ICD-10-CM ICD-9-CM    1. Acute URI J06.9 465.9 guaiFENesin-codeine (ROBITUSSIN AC) 100-10 mg/5 mL solution      amoxicillin-clavulanate (AUGMENTIN) 875-125 mg per tablet       I have reviewed with the patient details of the assessment and plan and all questions were answered. Relevant patient education was performed. Follow-up Disposition:  Return if symptoms worsen or fail to improve.

## 2017-08-10 NOTE — PROGRESS NOTES
1. Have you been to the ER, urgent care clinic since your last visit? Hospitalized since your last visit?no    2. Have you seen or consulted any other health care providers outside of the [address] [place] [address] since your last visit? Include any pap smears or colon screening.  no

## 2017-08-22 ENCOUNTER — APPOINTMENT (OUTPATIENT)
Dept: CT IMAGING | Age: 52
End: 2017-08-22
Attending: PHYSICIAN ASSISTANT
Payer: MEDICARE

## 2017-08-22 ENCOUNTER — HOSPITAL ENCOUNTER (EMERGENCY)
Age: 52
Discharge: HOME | End: 2017-08-22
Attending: EMERGENCY MEDICINE
Payer: MEDICARE

## 2017-08-22 VITALS
DIASTOLIC BLOOD PRESSURE: 89 MMHG | TEMPERATURE: 98.2 F | HEIGHT: 73 IN | OXYGEN SATURATION: 98 % | SYSTOLIC BLOOD PRESSURE: 146 MMHG | WEIGHT: 174.6 LBS | RESPIRATION RATE: 13 BRPM | BODY MASS INDEX: 23.14 KG/M2 | HEART RATE: 62 BPM

## 2017-08-22 DIAGNOSIS — M50.30 DDD (DEGENERATIVE DISC DISEASE), CERVICAL: ICD-10-CM

## 2017-08-22 DIAGNOSIS — M54.12 CERVICAL RADICULOPATHY: Primary | ICD-10-CM

## 2017-08-22 PROCEDURE — 99283 EMERGENCY DEPT VISIT LOW MDM: CPT

## 2017-08-22 PROCEDURE — 75810000123 HC INJ'S ANES/STEROID AGT PERIPH NERVE

## 2017-08-22 PROCEDURE — 74011250637 HC RX REV CODE- 250/637: Performed by: PHYSICIAN ASSISTANT

## 2017-08-22 PROCEDURE — 74011000250 HC RX REV CODE- 250: Performed by: PHYSICIAN ASSISTANT

## 2017-08-22 PROCEDURE — 72125 CT NECK SPINE W/O DYE: CPT

## 2017-08-22 RX ORDER — CYCLOBENZAPRINE HCL 10 MG
10 TABLET ORAL
Status: COMPLETED | OUTPATIENT
Start: 2017-08-22 | End: 2017-08-22

## 2017-08-22 RX ORDER — IBUPROFEN 600 MG/1
600 TABLET ORAL
Status: COMPLETED | OUTPATIENT
Start: 2017-08-22 | End: 2017-08-22

## 2017-08-22 RX ORDER — LIDOCAINE 50 MG/G
1 PATCH TOPICAL
Status: DISCONTINUED | OUTPATIENT
Start: 2017-08-22 | End: 2017-08-22 | Stop reason: HOSPADM

## 2017-08-22 RX ORDER — LIDOCAINE 50 MG/G
PATCH TOPICAL
Qty: 5 EACH | Refills: 0 | Status: SHIPPED | OUTPATIENT
Start: 2017-08-22 | End: 2020-11-25 | Stop reason: ALTCHOICE

## 2017-08-22 RX ORDER — IBUPROFEN 800 MG/1
800 TABLET ORAL
Qty: 30 TAB | Refills: 0 | Status: SHIPPED | OUTPATIENT
Start: 2017-08-22 | End: 2018-08-09

## 2017-08-22 RX ORDER — BUPIVACAINE HYDROCHLORIDE 5 MG/ML
5 INJECTION, SOLUTION EPIDURAL; INTRACAUDAL
Status: COMPLETED | OUTPATIENT
Start: 2017-08-22 | End: 2017-08-22

## 2017-08-22 RX ADMIN — BUPIVACAINE HYDROCHLORIDE 25 MG: 5 INJECTION, SOLUTION EPIDURAL; INTRACAUDAL; PERINEURAL at 11:27

## 2017-08-22 RX ADMIN — CYCLOBENZAPRINE HYDROCHLORIDE 10 MG: 10 TABLET, FILM COATED ORAL at 11:25

## 2017-08-22 RX ADMIN — IBUPROFEN 600 MG: 600 TABLET, FILM COATED ORAL at 11:25

## 2017-08-22 NOTE — ED NOTES
Patient c/o one week h/o right neck, right shoulder pain, right forearm pain, and right hand pain. Patient reports tripping on a piece of furniture and breaking his fall with right arm. Patient advises previous neck injury 6 years ago. Patient taking Flexeril without relief of pain.

## 2017-08-22 NOTE — DISCHARGE INSTRUCTIONS
Thank you for allowing us to provide you with excellent care today. We hope we addressed all of your concerns and needs. We strive to provide excellent quality care in the Emergency Department. Please rate us as excellent, as anything less than excellent does not meet our expectations. If you feel that you have not received excellent quality care or timely care, please ask to speak to the nurse manager. Please choose us in the future for your continued health care needs. The exam and treatment you received in the Emergency Department were for an urgent problem and are not intended as complete care. It is important that you follow-up with a doctor, nurse practitioner, or physician assistant to:  (1) confirm your diagnosis,  (2) re-evaluation of changes in your illness and treatment, and  (3) for ongoing care. If your symptoms become worse or you do not improve as expected and you are unable to reach your usual health care provider, you should return to the Emergency Department. We are available 24 hours a day. Take this sheet with you when you go to your follow-up visit. If you have any problem arranging the follow-up visit, contact [address] [place] [address] 21 [phone])    Make an appointment with your Primary Care doctor for follow up of this visit. Return to the ER if you are unable to be seen in the time recommended on your discharge instructions. Cervical Disc Disease: Care Instructions  Your Care Instructions    Cervical disc disease results from damage, disease, or wear and tear to the discs between the bones (vertebra) in your neck. The discs act as shock absorbers for the spine and keep the spine flexible. When a disc is damaged, it can bulge out and press against the nerve roots or spinal cord. This is sometimes called a herniated or \"slipped disc. \" This pressure can cause pain and numbness or tingling in your arms and hands. It can also cause weakness in your legs.   An accident can damage a disc and cause it to break open (rupture). Aging and hard physical work can also cause damage to cervical discs. The first treatments for cervical disc disease include physical therapy, special neck exercises, heat, and pain medicine. If these fail, your doctor may inject steroids and pain medicine into your neck. Surgery is usually done only if other treatments have not worked. Follow-up care is a key part of your treatment and safety. Be sure to make and go to all appointments, and call your doctor if you are having problems. It's also a good idea to know your test results and keep a list of the medicines you take. How can you care for yourself at home? · Take pain medicines exactly as directed. ¨ If the doctor gave you a prescription medicine for pain, take it as prescribed. ¨ If you are not taking a prescription pain medicine, ask your doctor if you can take an over-the-counter medicine. · Don't spend too long in one position. Take short breaks to move around and change positions. · Wear a seat belt and shoulder harness when you are in a car. · Sleep with a pillow under your head and neck that keeps your neck straight. · Follow your doctor's instructions for gentle neck-stretching exercises. · Do not smoke. Smoking can slow healing of your discs. If you need help quitting, talk to your doctor about stop-smoking programs and medicines. These can increase your chances of quitting for good. · Avoid strenuous work or exercise until your doctor says it is okay. When should you call for help? Call 911 anytime you think you may need emergency care. For example, call if:  · You are unable to move an arm or a leg at all. Call your doctor now or seek immediate medical care if:  · You have new or worse symptoms in your arms, legs, chest, belly, or buttocks. Symptoms may include:  ¨ Numbness or tingling. ¨ Weakness. ¨ Pain. · You lose bladder or bowel control.   Watch closely for changes in your health, and be sure to contact your doctor if:  · You are not getting better as expected. Where can you learn more? Go to http://[employer].info/. Enter N118 in the search box to learn more about \"Cervical Disc Disease: Care Instructions. \"  Current as of: March 21, 2017  Content Version: 11.3  © 7954-6084 [employer]. Care instructions adapted under license by [employer] (which disclaims liability or warranty for this information). If you have questions about a medical condition or this instruction, always ask your healthcare professional. [address] any warranty or liability for your use of this information.

## 2017-08-22 NOTE — ED PROVIDER NOTES
HPI Comments: Yesi Lewis is a 49 y.o. male with PMhx significant for cervical anterior discectomy and fusion who presents ambulatory to the ED with c/o persistent worsening right sided neck, shoulder and arm pain with secondary pain with swallowing x last night. The pt states the pain started after a GLF landing on his right side x 2 weeks ago. He notes he has an appointment in October with his Orthopedic doctor, but the pain with swallowing prompted his visit to the ED. He also notes numbness and weakness in the right hand. Patient reports a history of neck problems in the past and had a plate placed in 6878 by Dr. Luke Bryant. The pt's last MRI and CT in March 2016 had no new findings. Pt denies taking any medications for the pain, but has Flexeril at home. He specifically denies any fevers, chills, nausea, vomiting, chest pain, shortness of breath, headache, rash, diarrhea, sweating or weight loss. PCP: Maria Luisa Mccord MD  Ortho: Dr. Ronny Concepcion history significant for: - Tobacco, - EtOH, - Illicit Drug Use  PMHx: HTN, Arthritis, MI, CKD  There are no other complaints, changes, or physical findings at this time. The history is provided by the patient. No  was used. 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 r/t PTSD- last seizure 2011       Past Surgical History:   Procedure Laterality Date    HX ORTHOPAEDIC  2007    cervical anterior discectomy and fusion--Dr. Ava Ribeiro         Family History:   Problem Relation Age of Onset    Diabetes Mother     Diabetes Father     Heart Disease Father     Cancer Father      prostate cancer    Hypertension Sister        Social History     Social History    Marital status: SINGLE     Spouse name: N/A    Number of children: N/A    Years of education: N/A     Occupational History    Not on file. Social History Main Topics    Smoking status: Former Smoker     Packs/day: 0.30     Types: Cigarettes    Smokeless tobacco: Never Used    Alcohol use No    Drug use: No    Sexual activity: Yes     Partners: Female     Birth control/ protection: Condom     Other Topics Concern    Not on file     Social History Narrative         ALLERGIES: Morphine    Review of Systems   Constitutional: Negative for chills, diaphoresis and fever. HENT: Positive for trouble swallowing. Negative for rhinorrhea and sore throat. Eyes: Negative for pain. Respiratory: Negative for shortness of breath, wheezing and stridor. Cardiovascular: Negative for chest pain and leg swelling. Gastrointestinal: Negative for abdominal pain, diarrhea, nausea and vomiting. Genitourinary: Negative for difficulty urinating, dysuria, flank pain and hematuria. Musculoskeletal: Positive for myalgias (R neck, shoulder, arm) and neck pain. Negative for back pain and neck stiffness. Skin: Negative for rash. Neurological: Negative for dizziness, seizures, syncope, weakness, light-headedness and headaches. Psychiatric/Behavioral: Negative for behavioral problems and confusion. Vitals:    08/22/17 1009 08/22/17 1220   BP: 145/81 146/89   Pulse: 66 62   Resp: 18 13   Temp: 98.2 °F (36.8 °C)    SpO2: 100% 98%   Weight: 79.2 kg (174 lb 9.7 oz)    Height: 6' 1\" (1.854 m)             Physical Exam   Constitutional: He is oriented to person, place, and time. He appears well-developed and well-nourished. No distress. HENT:   Head: Normocephalic and atraumatic. Right Ear: External ear normal.   Left Ear: External ear normal.   Nose: Nose normal.   Mouth/Throat: No oropharyngeal exudate, posterior oropharyngeal edema or posterior oropharyngeal erythema.    Eyes: Conjunctivae and EOM are normal.   Neck: Normal range of motion and full passive range of motion without pain. Neck supple. Spinous process tenderness and muscular tenderness present. No rigidity. No edema, no erythema and normal range of motion present. Palpable muscle spasm and TTP over R trapezius, decreased ROM in R shoulder secondary to pain. Cardiovascular: Normal rate, regular rhythm, normal heart sounds and intact distal pulses. No murmur heard. Pulmonary/Chest: Effort normal and breath sounds normal. He has no decreased breath sounds. He has no wheezes. Abdominal: Soft. Bowel sounds are normal. He exhibits no distension. There is no tenderness. There is no guarding. Musculoskeletal: Normal range of motion. He exhibits no edema. Cervical back: He exhibits tenderness (midline). BACK: Normal spinal curvatures. No step off or deformity. NT to palpation along midline. Negative seated SLR bilaterally. Strength of the LE 5/5 and equal bilaterally. Ambulatory without difficulty. Neurological: He is alert and oriented to person, place, and time. No focal neuro deficits. NVI. Neurologically intact of UE and LE B/L  Sensation intact and symmetrical of UE and LE B/L. Strength 5/5 of LUE and 4/5 of RUE, Strength 5/5 of LE B/L. Symmetric bulk and tone of LE muscle groups. Skin: Skin is warm and dry. No rash noted. He is not diaphoretic. Psychiatric: He has a normal mood and affect. His behavior is normal. Judgment normal.   Nursing note and vitals reviewed. MDM  Number of Diagnoses or Management Options  Cervical radiculopathy:   DDD (degenerative disc disease), cervical:   Diagnosis management comments: DDX: strain, sprain, contusion, compression fracture, cervical radiculopathy, spinal spondylosis vs spondylolisthesis, spinal stenosis. No concern for meningitis or other infectious cause. Patient presents with neck pain with radiating symptoms. Will assess with CT and control pain.  CT does not reveal any acute abnormality. Trigger point injection tolerated well and patient reports some relief of his pain. Will discharge home and patient will f/u with Orthopedic as scheduled. Amount and/or Complexity of Data Reviewed  Tests in the radiology section of CPT®: ordered and reviewed    Patient Progress  Patient progress: stable    ED Course       Procedures    Chief Complaint   Patient presents with    Shoulder Pain     pt had a fall 2 weeks ago onto right shoulder and elbow. pt with continuing shoulder pain along with pain and swelling in the right neck    Dysphagia     x 1 day, progressively worse .  Neck Swelling       MEDICATIONS GIVEN:  Medications   bupivacaine (PF) (MARCAINE) 0.5 % (5 mg/mL) injection 25 mg (25 mg SubCUTAneous Given by Provider 8/22/17 1127)   ibuprofen (MOTRIN) tablet 600 mg (600 mg Oral Given 8/22/17 1125)   cyclobenzaprine (FLEXERIL) tablet 10 mg (10 mg Oral Given 8/22/17 1125)       LABS REVIEWED:  No results found for this or any previous visit (from the past 24 hour(s)). VITAL SIGNS:  Patient Vitals for the past 12 hrs:   Temp Pulse Resp BP SpO2   08/22/17 1220 - 62 13 146/89 98 %   08/22/17 1009 98.2 °F (36.8 °C) 66 18 145/81 100 %       RADIOLOGY RESULTS:  The following have been ordered and reviewed:  CT SPINE CERV WO CONT   Final Result   EXAM:  CT CERVICAL SPINE WITHOUT CONTRAST     INDICATION:   Status post fall 2 weeks ago with persistent right neck pain and  swelling.     COMPARISON: 4/13/2017     CONTRAST:  None.     TECHNIQUE: Multislice helical CT of the cervical spine was performed without  intravenous contrast administration. Sagittal and coronal reconstructions were  generated. CT dose reduction was achieved through use of a standardized  protocol tailored for this examination and automatic exposure control for dose  modulation.      FINDINGS:     The alignment is within normal limits. There is no fracture or subluxation.  The  odontoid process is intact. The craniocervical junction is within normal limits. The prevertebral soft tissues are within normal limits. The patient is status  post anterior cervical fusion at C5-6. Again, there is no osseous bridging of  effusion but there is no evidence of acute hardware complication. Spondylosis is  noted at C4-5 and C6-7. Multilevel bilateral neural foraminal narrowing is noted  secondary to uncovertebral osteophyte formation.     IMPRESSION  IMPRESSION:  No acute abnormality. Again, no osseous bridging of the C5-6 fusion is noted but  there is no acute hardware complication. Spondylitic changes are again  demonstrated.          PROCEDURES:  Procedure Note - Trigger Point Injection:    10:48 AM  Performed by Karan Hassan PA-C. A trigger point injection was performed on the right trapezius. Area was prepped with an alcohol swab. 5 mL of 0.5% bupivicaine was injected. Total time at bedside, performing this procedure was 10 minutes. The procedure was tolerated well without any complications. PROGRESS NOTES:  10:48 AM  Initial assessment of patient complete, and patient was given the opportunity to ask questions. Plan of care reviewed with patient and will update when results are available. 12:20 PM  I have just reevaluated the patient. I have reviewed his vital signs and determined there is currently no worsening in their condition or physical exam. Results have been reviewed with them and their questions have been answered. 12:23 PM  I have reviewed discharge instructions with the patient and explained medications that he is being discharged with. The patient verbalized understanding and agrees with plan. DIAGNOSIS:    1. Cervical radiculopathy    2. DDD (degenerative disc disease), cervical        PLAN:  1. Discharge home  2. Medications as directed  3. F/u with PCP in 2-3 days  4.  Return precautions reviewed    Follow-up Information     Follow up With Details Comments Contact Tami TRACEY Camelia Chen MD Go to for scheduled appointment [facility]  [address]  [facility]  [phone]      [facility] EMERGENCY DEPT  As needed, If symptoms worsen [address] [place] [address]  [phone]        Discharge Medication List as of 8/22/2017 12:20 PM      START taking these medications    Details   ibuprofen (MOTRIN) 800 mg tablet Take 1 Tab by mouth every eight (8) hours as needed for Pain., Normal, Disp-30 Tab, R-0      lidocaine (LIDODERM) 5 % Apply patch to the affected area for 12 hours a day and remove for 12 hours a day., Normal, Disp-5 Each, R-0         CONTINUE these medications which have NOT CHANGED    Details   guaiFENesin-codeine (ROBITUSSIN AC) 100-10 mg/5 mL solution Take 5 mL by mouth three (3) times daily as needed for Cough. Max Daily Amount: 15 mL. , Print, Disp-120 mL, R-0      cyclobenzaprine (FLEXERIL) 5 mg tablet Historical Med      tiZANidine (ZANAFLEX) 2 mg tablet TAKE 1 TO 2 TABLETS PO THREE TIMES A DAY AS NEEDED FOR SPASMS, Normal, Disp-30 Tab, R-3      tadalafil (CIALIS) 20 mg tablet TAKE 1 TABLET BY MOUTH AS NEEDED, Normal, Disp-8 Tab, R-6      pyridoxine, vitamin B6, (VITAMIN B-6) 100 mg tablet Take 1 Tab by mouth daily. , Normal, Disp-30 Tab, R-0      sertraline (ZOLOFT) 100 mg tablet Take 1/2 tablet by mouth daily x 1 week and then take 1 whole tablet daily. , Normal, Disp-30 Tab, R-0      buPROPion XL (WELLBUTRIN XL) 150 mg tablet Take 1 Tab by mouth every morning., Normal, Disp-30 Tab, R-0      methocarbamol (ROBAXIN) 750 mg tablet Take  by mouth three (3) times daily. , Historical Med      nabumetone (RELAFEN) 500 mg tablet Take  by mouth two (2) times a day., Historical Med      amLODIPine (NORVASC) 10 mg tablet Take 1 Tab by mouth daily. , Normal, Disp-30 Tab, R-6      HYDROcodone-acetaminophen (NORCO) 5-325 mg per tablet Take 1 Tab by mouth every six (6) hours as needed., Historical Med      cloNIDine HCl (CATAPRES) 0.1 mg tablet Take 0.1 mg by mouth every evening., Historical Med      gabapentin (NEURONTIN) 400 mg capsule Take 400 mg by mouth three (3) times daily. , Historical Med      multivitamin (ONE A DAY) tablet Take 1 Tab by mouth daily. , Historical Med      Cholecalciferol, Vitamin D3, 5,000 unit Tab Take 10,000 Int'l Units by mouth daily. Indications: VITAMIN D DEFICIENCY, Historical Med      aspirin 81 mg tablet Take 1 Tab by mouth daily. , No Print, Disp-30 Tab, R-11               ED COURSE: The patients hospital course has been uncomplicated. DISCHARGE NOTE:  12:23 PM  The care plan has been outline with the patient and/or family, who verbally conveyed understanding and agreement. Available results have been reviewed. Patient and/or family understand the follow up plan as outlined and discharge instructions. Should their condition deterioration at any time after discharge the patient agrees to return, follow up sooner than outlined or seek medical assistance at the closest Emergency Room as soon as possible. Questions have been answered. Discharge instructions and educational information regarding the patient's diagnosis as well a list of reasons why the patient would want to seek immediate medical attention, should their condition change, were reviewed directly with the patient/family     7:51 AM  I was personally available for consultation in the emergency department. I have reviewed the chart and agree with the documentation recorded by the [facility], including the assessment, treatment plan, and disposition.   Bong Clarke MD          This note will not be viewable in [address]

## 2017-08-22 NOTE — ED NOTES
PA has  reviewed discharge instructions with the patient. The patient verbalized understanding. Pt ambulatory home.

## 2017-09-29 NOTE — ANCILLARY DISCHARGE INSTRUCTIONS
[facility]   [address] [address]  Phone: [phone] Fax: [phone]      Discharge Summary 2-15      Patient name: Cynthia Cardona  : 1965  Provider#: [id number]  Referral source: MARI Holt      Medical/Treatment Diagnosis: Radiculopathy, cervical region [M54.12]     Prior Hospitalization: see medical history     Comorbidities: depression, OA, osteoporosis and HTN  Prior Level of Function: exercises seldom due to pain for the past 10 years  Medications: Verified on Patient Summary List    Start of Care: 18     Onset Date:2007   Visits from Start of Care: 11    Missed Visits: 8  Reporting Period :  17 to  17    Progress towards goals / Updated goals:      Short Term Goals: To be accomplished in 4 weeks:  1.) Pt will be independent with HEP in order to better manage s/s. MET  2.) Pt will report pain levels of 5/10 at rest for 4 sessions in order to improve tolerance to daily activities. Partially MET      Long Term Goals: To be accomplished in 8 weeks:  1) Pt will have 20 degrees of cervical rotation to the right in order to improve driving confidence and performance progressing. MET  2) pt will have  strength on the right to 15 lbs  MET  3) Pt will have pain levels to 2-3/10 at rest to improve tolerance to daily activities. NOT MET      Assessment/Summary of care: The pt has been seeing 11 sessions and is making good gains with improvement in right  strength, cervical ROM, and strength of right UE. He did however present with decreased strength of his left UE at re-eval and decreased  strength on the left. FOTO score decreased despite good gains in impairments. Pain levels  gradually decreased, but continues to range from 6-10/10.  We had planned to continue to address pain, soft tissue restriction, nerve tension, and radicular s/s with manual therapy and modalities and transfer his care to our [facility] facility, however he failed to show to his scheduled appoitment there. G-Codes (GP)  Mobility  [id number] Current  CK= 40-59%  [id number] Goal  CJ= 20-39%  D/C  CK= 40-59%      The severity rating is based on clinical judgment and the FOTO Score score.     RECOMMENDATIONS:  [x]Discontinue therapy: []Patient has reached or is progressing toward set goals     [x]Patient is non-compliant or has abdicated     []Due to lack of appreciable progress towards set goals    Bettie Jaimes 9/29/2017 8:11 AM

## 2017-10-05 ENCOUNTER — OFFICE VISIT (OUTPATIENT)
Dept: INTERNAL MEDICINE CLINIC | Age: 52
End: 2017-10-05

## 2017-10-05 VITALS
BODY MASS INDEX: 22.93 KG/M2 | HEART RATE: 70 BPM | HEIGHT: 73 IN | OXYGEN SATURATION: 97 % | SYSTOLIC BLOOD PRESSURE: 125 MMHG | TEMPERATURE: 98.8 F | WEIGHT: 173 LBS | DIASTOLIC BLOOD PRESSURE: 83 MMHG

## 2017-10-05 DIAGNOSIS — R80.9 PROTEINURIA, UNSPECIFIED TYPE: Primary | ICD-10-CM

## 2017-10-05 DIAGNOSIS — Z23 ENCOUNTER FOR IMMUNIZATION: ICD-10-CM

## 2017-10-05 DIAGNOSIS — N18.2 CKD (CHRONIC KIDNEY DISEASE) STAGE 2, GFR 60-89 ML/MIN: ICD-10-CM

## 2017-10-05 DIAGNOSIS — E78.00 PURE HYPERCHOLESTEROLEMIA: ICD-10-CM

## 2017-10-05 DIAGNOSIS — R73.09 ELEVATED GLUCOSE: ICD-10-CM

## 2017-10-05 RX ORDER — LOSARTAN POTASSIUM 25 MG/1
TABLET ORAL DAILY
COMMUNITY
End: 2019-02-08

## 2017-10-05 RX ORDER — HYDRALAZINE HYDROCHLORIDE 25 MG/1
25 TABLET, FILM COATED ORAL 2 TIMES DAILY
COMMUNITY

## 2017-10-05 NOTE — MR AVS SNAPSHOT
Visit Information Date & Time Provider Department Dept. Phone Encounter #  
 10/5/2017  1:30 PM Ileana Douglas, [address] [place] [address],[address] [phone] [id number] Follow-up Instructions Return in about 6 months (around 4/5/2018) for prediaebtes ckd 2 htn pneumovax. Upcoming Health Maintenance Date Due DTaP/Tdap/Td series (1 - Tdap) 2/24/2009 INFLUENZA AGE 9 TO ADULT 8/1/2017 MEDICARE YEARLY EXAM 5/24/2018 COLONOSCOPY 9/30/2020 Allergies as of 10/5/2017  Review Complete On: 10/5/2017 By: Ileana Douglas MD  
  
 Severity Noted Reaction Type Reactions Morphine  05/04/2013    Itching Current Immunizations  Reviewed on 12/6/2012 Name Date Influenza Vaccine 11/14/2014, 12/6/2012 11:16 AM  
 Influenza Vaccine (Quad) PF  Incomplete TB Skin Test (PPD) Intradermal 1/9/2015 Td 2/23/2009 Tdap  Incomplete Not reviewed this visit You Were Diagnosed With   
  
 Codes Comments Proteinuria, unspecified type    -  Primary ICD-10-CM: R80.9 ICD-9-CM: 791.0 CKD (chronic kidney disease) stage 2, GFR 60-89 ml/min     ICD-10-CM: N18.2 ICD-9-CM: 291. 2 Elevated glucose     ICD-10-CM: R73.09 
ICD-9-CM: 790.29 Pure hypercholesterolemia     ICD-10-CM: E78.00 ICD-9-CM: 272.0 Encounter for immunization     ICD-10-CM: C95 ICD-9-CM: V03.89 Vitals BP Pulse Temp Height(growth percentile) Weight(growth percentile) SpO2  
 125/83 (BP 1 Location: Left arm, BP Patient Position: Sitting) 70 98.8 °F (37.1 °C) (Oral) 6' 1\" (1.854 m) 173 lb (78.5 kg) 97% BMI Smoking Status 22.82 kg/m2 Former Smoker Vitals History BMI and BSA Data Body Mass Index Body Surface Area  
 22.82 kg/m 2 2.01 m 2 Preferred Pharmacy Pharmacy Name Phone [facility] [address], [address] [place] [address] [phone] Your Updated Medication List  
  
   
 This list is accurate as of: 10/5/17  2:05 PM.  Always use your most recent med list. amLODIPine 10 mg tablet Commonly known as:  Lonnie Mcclure Take 1 Tab by mouth daily. aspirin 81 mg tablet Take 1 Tab by mouth daily. buPROPion  mg tablet Commonly known as:  Tay Hernandez Take 1 Tab by mouth every morning. cholecalciferol (VITAMIN D3) 5,000 unit Tab tablet Commonly known as:  VITAMIN D3 Take 10,000 Int'l Units by mouth daily. Indications: VITAMIN D DEFICIENCY  
  
 cloNIDine HCl 0.1 mg tablet Commonly known as:  CATAPRES Take 0.1 mg by mouth every evening. cyclobenzaprine 5 mg tablet Commonly known as:  FLEXERIL  
  
 gabapentin 400 mg capsule Commonly known as:  NEURONTIN Take 400 mg by mouth three (3) times daily. hydrALAZINE 25 mg tablet Commonly known as:  APRESOLINE Take 25 mg by mouth three (3) times daily. HYDROcodone-acetaminophen 5-325 mg per tablet Commonly known as:  Edwin Chirinos Take 1 Tab by mouth every six (6) hours as needed. ibuprofen 800 mg tablet Commonly known as:  MOTRIN Take 1 Tab by mouth every eight (8) hours as needed for Pain.  
  
 lidocaine 5 % Commonly known as:  Petra Host Apply patch to the affected area for 12 hours a day and remove for 12 hours a day. losartan 25 mg tablet Commonly known as:  COZAAR Take  by mouth daily. methocarbamol 750 mg tablet Commonly known as:  ROBAXIN Take  by mouth three (3) times daily. multivitamin tablet Commonly known as:  ONE A DAY Take 1 Tab by mouth daily. nabumetone 500 mg tablet Commonly known as:  RELAFEN Take  by mouth two (2) times a day. pyridoxine (vitamin B6) 100 mg tablet Commonly known as:  VITAMIN B-6 Take 1 Tab by mouth daily. sertraline 100 mg tablet Commonly known as:  ZOLOFT Take 1/2 tablet by mouth daily x 1 week and then take 1 whole tablet daily. tadalafil 20 mg tablet Commonly known as:  CIALIS  
TAKE 1 TABLET BY MOUTH AS NEEDED  
  
 tiZANidine 2 mg tablet Commonly known as:  Michael Leyva TAKE 1 TO 2 TABLETS PO THREE TIMES A DAY AS NEEDED FOR SPASMS We Performed the Following CBC W/O DIFF [24868 CPT(R)] HEMOGLOBIN A1C WITH EAG [93842 CPT(R)] INFLUENZA VIRUS VAC QUAD,SPLIT,PRESV FREE SYRINGE IM [id number] CPT(R)] LIPID PANEL [87636 CPT(R)] METABOLIC PANEL, BASIC [86385 CPT(R)] MICROALBUMIN, UR, RAND W/ MICROALBUMIN/CREA RATIO [id number] CPT(R)] TETANUS, DIPHTHERIA TOXOIDS AND ACELLULAR PERTUSSIS VACCINE (TDAP), IN INDIVIDS. >=7, IM [id number] CPT(R)] TSH 3RD GENERATION [54181 CPT(R)] Follow-up Instructions Return in about 6 months (around 4/5/2018) for prediaebtes ckd 2 htn pneumovax. Introducing \[facility] [place] [facility]\"" & HEALTH SERVICES! Dear Cal Schultz: 
Thank you for requesting a [employer] account. Our records indicate that you already have an active Switchcam account. You can access your account anytime at [email or web address] Did you know that you can access your hospital and ER discharge instructions at any time in Switch[employer]? You can also review all of your test results from your hospital stay or ER visit. Additional Information If you have questions, please visit the Frequently Asked Questions section of the Switch[employer] website at [email or web address] Remember, Switchcam is NOT to be used for urgent needs. For medical emergencies, dial 911. Now available from your iPhone and Android! Please provide this summary of care documentation to your next provider. Your primary care clinician is listed as Gavino BURDICK. If you have any questions after today's visit, please call [phone].

## 2017-10-05 NOTE — PROGRESS NOTES
HISTORY OF PRESENT ILLNESS  Isrrael Galvez is a 49 y.o. male. HPI     F/u HTN , hx hyperglycemia with FH DM-2, HLD  See nephrologist q 6 mos f/u CKD-2/proteinuria--Dr Cristobal Velazquez who has requested labs to be done--microalbumin, cbc bmp  Has been started on losartan by renal MD  [facility] nurse visit recently--a1c 6.0. admvised to get immunizations--tdap, flu shot, pneumovax and zostavax      Patient Active Problem List    Diagnosis Date Noted    Ulnar neuropathy at elbow of left upper extremity 09/07/2016    Cervical post-laminectomy syndrome 09/07/2016    Cervical radiculopathy due to degenerative joint disease of spine 09/07/2016    PPD positive 01/12/2015    ADD (attention deficit disorder with hyperactivity) 11/15/2011    Proteinuria 02/07/2011    FH: CAD (coronary artery disease) 12/16/2010    Solitary kidney 12/14/2010    Gout 12/14/2010    Elevated LFT's 12/14/2010    HTN (hypertension) 02/23/2010    Hyperglycemia 02/23/2010    PTSD (post-traumatic stress disorder) 02/23/2010    Chronic neck pain 02/23/2010    Anxiety 02/23/2010    Family history of diabetes mellitus 02/23/2010     Current Outpatient Prescriptions   Medication Sig Dispense Refill    hydrALAZINE (APRESOLINE) 25 mg tablet Take 25 mg by mouth three (3) times daily.  losartan (COZAAR) 25 mg tablet Take  by mouth daily.  cyclobenzaprine (FLEXERIL) 5 mg tablet       tiZANidine (ZANAFLEX) 2 mg tablet TAKE 1 TO 2 TABLETS PO THREE TIMES A DAY AS NEEDED FOR SPASMS 30 Tab 3    sertraline (ZOLOFT) 100 mg tablet Take 1/2 tablet by mouth daily x 1 week and then take 1 whole tablet daily. 30 Tab 0    buPROPion XL (WELLBUTRIN XL) 150 mg tablet Take 1 Tab by mouth every morning. 30 Tab 0    methocarbamol (ROBAXIN) 750 mg tablet Take  by mouth three (3) times daily.  amLODIPine (NORVASC) 10 mg tablet Take 1 Tab by mouth daily. 30 Tab 6    cloNIDine HCl (CATAPRES) 0.1 mg tablet Take 0.1 mg by mouth every evening.  gabapentin (NEURONTIN) 400 mg capsule Take 400 mg by mouth three (3) times daily.  multivitamin (ONE A DAY) tablet Take 1 Tab by mouth daily.  Cholecalciferol, Vitamin D3, 5,000 unit Tab Take 10,000 Int'l Units by mouth daily. Indications: VITAMIN D DEFICIENCY      aspirin 81 mg tablet Take 1 Tab by mouth daily. 30 Tab 11    ibuprofen (MOTRIN) 800 mg tablet Take 1 Tab by mouth every eight (8) hours as needed for Pain. 30 Tab 0    lidocaine (LIDODERM) 5 % Apply patch to the affected area for 12 hours a day and remove for 12 hours a day. 5 Each 0    tadalafil (CIALIS) 20 mg tablet TAKE 1 TABLET BY MOUTH AS NEEDED 8 Tab 6    pyridoxine, vitamin B6, (VITAMIN B-6) 100 mg tablet Take 1 Tab by mouth daily. 30 Tab 0    nabumetone (RELAFEN) 500 mg tablet Take  by mouth two (2) times a day.  HYDROcodone-acetaminophen (NORCO) 5-325 mg per tablet Take 1 Tab by mouth every six (6) hours as needed. Allergies   Allergen Reactions    Morphine Itching      Lab Results  Component Value Date/Time   Hemoglobin A1c 5.8 04/16/2010 12:06 PM   Glucose 89 03/11/2015 02:58 PM   LDL, calculated 149 05/23/2017 04:09 PM   Creatinine 1.34 03/11/2015 02:58 PM      Lab Results  Component Value Date/Time   Cholesterol, total 251 05/23/2017 04:09 PM   HDL Cholesterol 51 05/23/2017 04:09 PM   LDL, calculated 149 05/23/2017 04:09 PM   Triglyceride 255 05/23/2017 04:09 PM   CHOL/HDL Ratio 4.0 05/04/2013 07:00 AM     Lab Results  Component Value Date/Time   GFR est non-AA 62 03/11/2015 02:58 PM   GFR est AA 72 03/11/2015 02:58 PM   Creatinine 1.34 03/11/2015 02:58 PM   BUN 20 03/11/2015 02:58 PM   Sodium 139 03/11/2015 02:58 PM   Potassium 4.8 03/11/2015 02:58 PM   Chloride 100 03/11/2015 02:58 PM   CO2 22 03/11/2015 02:58 PM          ROS    Physical Exam   Constitutional: He appears well-developed and well-nourished. No distress. Appears stated age   HENT:   Head: Normocephalic.    Cardiovascular: Normal rate, regular rhythm and normal heart sounds. Exam reveals no gallop and no friction rub. No murmur heard. Pulmonary/Chest: Effort normal and breath sounds normal. No respiratory distress. He has no wheezes. He has no rales. He exhibits no tenderness. Abdominal: Soft. Musculoskeletal: He exhibits no edema. Neurological: He is alert. Psychiatric: He has a normal mood and affect. Nursing note and vitals reviewed. ASSESSMENT and PLAN  Diagnoses and all orders for this visit:    1. Proteinuria, unspecified type     2. CKD (chronic kidney disease) stage 2, GFR 60-89 ml/min  -     CBC W/O DIFF  -     METABOLIC PANEL, BASIC  -     MICROALBUMIN, UR, RAND W/ MICROALBUMIN/CREA RATIO   Forward to Dr Bernard Curry    3. Elevated glucose  -     METABOLIC PANEL, BASIC  -     HEMOGLOBIN A1C WITH EAG   Discussed low carb diet     4. Pure hypercholesterolemia  -     LIPID PANEL  -     TSH 3RD GENERATION   Consider statin but pt prefers to manage with diet and exercise for now    5. Encounter for immunization  -     Influenza virus vaccine (QUADRIVALENT PRES FREE SYRINGE) IM ([id number])  -     Tetanus, diphtheria toxoids and acellular pertussis (TDAP) vaccine, in individuals >=7 years, IM      Follow-up Disposition:  Return in about 6 months (around 4/5/2018) for prediaebtes ckd 2 htn pneumovax.

## 2017-10-06 LAB
ALBUMIN/CREAT UR: 482.4 MG/G CREAT (ref 0–30)
BUN SERPL-MCNC: 12 MG/DL (ref 6–24)
BUN/CREAT SERPL: 9 (ref 9–20)
CALCIUM SERPL-MCNC: 9.6 MG/DL (ref 8.7–10.2)
CHLORIDE SERPL-SCNC: 101 MMOL/L (ref 96–106)
CHOLEST SERPL-MCNC: 231 MG/DL (ref 100–199)
CO2 SERPL-SCNC: 25 MMOL/L (ref 18–29)
CREAT SERPL-MCNC: 1.38 MG/DL (ref 0.76–1.27)
CREAT UR-MCNC: 144.9 MG/DL
ERYTHROCYTE [DISTWIDTH] IN BLOOD BY AUTOMATED COUNT: 15.1 % (ref 12.3–15.4)
EST. AVERAGE GLUCOSE BLD GHB EST-MCNC: 117 MG/DL
GLUCOSE SERPL-MCNC: 93 MG/DL (ref 65–99)
HBA1C MFR BLD: 5.7 % (ref 4.8–5.6)
HCT VFR BLD AUTO: 41 % (ref 37.5–51)
HDLC SERPL-MCNC: 56 MG/DL
HGB BLD-MCNC: 13.7 G/DL (ref 12.6–17.7)
LDLC SERPL CALC-MCNC: 149 MG/DL (ref 0–99)
MCH RBC QN AUTO: 29.5 PG (ref 26.6–33)
MCHC RBC AUTO-ENTMCNC: 33.4 G/DL (ref 31.5–35.7)
MCV RBC AUTO: 88 FL (ref 79–97)
MICROALBUMIN UR-MCNC: 699 UG/ML
PLATELET # BLD AUTO: 347 X10E3/UL (ref 150–379)
POTASSIUM SERPL-SCNC: 4.7 MMOL/L (ref 3.5–5.2)
RBC # BLD AUTO: 4.65 X10E6/UL (ref 4.14–5.8)
SODIUM SERPL-SCNC: 141 MMOL/L (ref 134–144)
TRIGL SERPL-MCNC: 129 MG/DL (ref 0–149)
TSH SERPL DL<=0.005 MIU/L-ACNC: 1.15 UIU/ML (ref 0.45–4.5)
VLDLC SERPL CALC-MCNC: 26 MG/DL (ref 5–40)
WBC # BLD AUTO: 8.7 X10E3/UL (ref 3.4–10.8)

## 2018-02-08 RX ORDER — AMLODIPINE BESYLATE 10 MG/1
TABLET ORAL
Qty: 30 TAB | Refills: 0 | Status: SHIPPED | OUTPATIENT
Start: 2018-02-08 | End: 2018-02-08 | Stop reason: SDUPTHER

## 2018-02-08 RX ORDER — AMLODIPINE BESYLATE 10 MG/1
TABLET ORAL
Qty: 30 TAB | Refills: 11 | Status: SHIPPED | OUTPATIENT
Start: 2018-02-08 | End: 2019-02-08

## 2018-02-27 ENCOUNTER — TELEPHONE (OUTPATIENT)
Dept: INTERNAL MEDICINE CLINIC | Age: 53
End: 2018-02-27

## 2018-02-27 DIAGNOSIS — F41.9 ANXIETY: Primary | ICD-10-CM

## 2018-02-27 NOTE — TELEPHONE ENCOUNTER
#[phone] pt states he is having anxiety and needs to see a physiatrist again. Pt needs name(s) of who to see.

## 2018-03-02 NOTE — TELEPHONE ENCOUNTER
Unable to reach patient LVM to return call.  Mailed referral home to patient, closing message at this time

## 2018-03-04 ENCOUNTER — APPOINTMENT (OUTPATIENT)
Dept: GENERAL RADIOLOGY | Age: 53
End: 2018-03-04
Attending: EMERGENCY MEDICINE
Payer: MEDICARE

## 2018-03-04 ENCOUNTER — HOSPITAL ENCOUNTER (EMERGENCY)
Age: 53
Discharge: HOME | End: 2018-03-04
Attending: EMERGENCY MEDICINE
Payer: MEDICARE

## 2018-03-04 VITALS
HEIGHT: 73 IN | HEART RATE: 87 BPM | RESPIRATION RATE: 16 BRPM | TEMPERATURE: 98 F | SYSTOLIC BLOOD PRESSURE: 130 MMHG | BODY MASS INDEX: 22.4 KG/M2 | DIASTOLIC BLOOD PRESSURE: 92 MMHG | OXYGEN SATURATION: 99 % | WEIGHT: 169 LBS

## 2018-03-04 DIAGNOSIS — R07.89 ATYPICAL CHEST PAIN: Primary | ICD-10-CM

## 2018-03-04 DIAGNOSIS — N28.9 RENAL INSUFFICIENCY: ICD-10-CM

## 2018-03-04 LAB
ANION GAP SERPL CALC-SCNC: 10 MMOL/L (ref 5–15)
BASOPHILS # BLD: 0 K/UL (ref 0–0.1)
BASOPHILS NFR BLD: 0 % (ref 0–1)
BUN SERPL-MCNC: 21 MG/DL (ref 6–20)
BUN/CREAT SERPL: 13 (ref 12–20)
CALCIUM SERPL-MCNC: 8.9 MG/DL (ref 8.5–10.1)
CHLORIDE SERPL-SCNC: 106 MMOL/L (ref 97–108)
CK MB CFR SERPL CALC: 0.4 % (ref 0–2.5)
CK MB SERPL-MCNC: 1 NG/ML (ref 5–25)
CK SERPL-CCNC: 262 U/L (ref 39–308)
CO2 SERPL-SCNC: 28 MMOL/L (ref 21–32)
CREAT SERPL-MCNC: 1.67 MG/DL (ref 0.7–1.3)
DIFFERENTIAL METHOD BLD: ABNORMAL
EOSINOPHIL # BLD: 0.1 K/UL (ref 0–0.4)
EOSINOPHIL NFR BLD: 1 % (ref 0–7)
ERYTHROCYTE [DISTWIDTH] IN BLOOD BY AUTOMATED COUNT: 13.6 % (ref 11.5–14.5)
GLUCOSE SERPL-MCNC: 121 MG/DL (ref 65–100)
HCT VFR BLD AUTO: 38.6 % (ref 36.6–50.3)
HGB BLD-MCNC: 13 G/DL (ref 12.1–17)
IMM GRANULOCYTES # BLD: 0 K/UL (ref 0–0.04)
IMM GRANULOCYTES NFR BLD AUTO: 0 % (ref 0–0.5)
LYMPHOCYTES # BLD: 4.9 K/UL (ref 0.8–3.5)
LYMPHOCYTES NFR BLD: 42 % (ref 12–49)
MCH RBC QN AUTO: 29.3 PG (ref 26–34)
MCHC RBC AUTO-ENTMCNC: 33.7 G/DL (ref 30–36.5)
MCV RBC AUTO: 87.1 FL (ref 80–99)
MONOCYTES # BLD: 0.6 K/UL (ref 0–1)
MONOCYTES NFR BLD: 6 % (ref 5–13)
NEUTS SEG # BLD: 5.8 K/UL (ref 1.8–8)
NEUTS SEG NFR BLD: 51 % (ref 32–75)
NRBC # BLD: 0 K/UL (ref 0–0.01)
NRBC BLD-RTO: 0 PER 100 WBC
PLATELET # BLD AUTO: 328 K/UL (ref 150–400)
PMV BLD AUTO: 10 FL (ref 8.9–12.9)
POTASSIUM SERPL-SCNC: 4 MMOL/L (ref 3.5–5.1)
RBC # BLD AUTO: 4.43 M/UL (ref 4.1–5.7)
SODIUM SERPL-SCNC: 144 MMOL/L (ref 136–145)
TROPONIN I SERPL-MCNC: <0.04 NG/ML
WBC # BLD AUTO: 11.5 K/UL (ref 4.1–11.1)

## 2018-03-04 PROCEDURE — 74011250636 HC RX REV CODE- 250/636: Performed by: EMERGENCY MEDICINE

## 2018-03-04 PROCEDURE — 36415 COLL VENOUS BLD VENIPUNCTURE: CPT | Performed by: EMERGENCY MEDICINE

## 2018-03-04 PROCEDURE — 74011000250 HC RX REV CODE- 250: Performed by: EMERGENCY MEDICINE

## 2018-03-04 PROCEDURE — 85025 COMPLETE CBC W/AUTO DIFF WBC: CPT | Performed by: EMERGENCY MEDICINE

## 2018-03-04 PROCEDURE — 96361 HYDRATE IV INFUSION ADD-ON: CPT

## 2018-03-04 PROCEDURE — 96374 THER/PROPH/DIAG INJ IV PUSH: CPT

## 2018-03-04 PROCEDURE — 71046 X-RAY EXAM CHEST 2 VIEWS: CPT

## 2018-03-04 PROCEDURE — 84484 ASSAY OF TROPONIN QUANT: CPT | Performed by: EMERGENCY MEDICINE

## 2018-03-04 PROCEDURE — 99283 EMERGENCY DEPT VISIT LOW MDM: CPT

## 2018-03-04 PROCEDURE — 80048 BASIC METABOLIC PNL TOTAL CA: CPT | Performed by: EMERGENCY MEDICINE

## 2018-03-04 PROCEDURE — 82550 ASSAY OF CK (CPK): CPT | Performed by: EMERGENCY MEDICINE

## 2018-03-04 PROCEDURE — 93005 ELECTROCARDIOGRAM TRACING: CPT

## 2018-03-04 RX ORDER — SODIUM CHLORIDE 9 MG/ML
150 INJECTION, SOLUTION INTRAVENOUS CONTINUOUS
Status: DISCONTINUED | OUTPATIENT
Start: 2018-03-04 | End: 2018-03-04 | Stop reason: HOSPADM

## 2018-03-04 RX ORDER — SODIUM CHLORIDE 0.9 % (FLUSH) 0.9 %
5-10 SYRINGE (ML) INJECTION EVERY 8 HOURS
Status: DISCONTINUED | OUTPATIENT
Start: 2018-03-04 | End: 2018-03-04 | Stop reason: HOSPADM

## 2018-03-04 RX ORDER — SODIUM CHLORIDE 0.9 % (FLUSH) 0.9 %
5-10 SYRINGE (ML) INJECTION AS NEEDED
Status: DISCONTINUED | OUTPATIENT
Start: 2018-03-04 | End: 2018-03-04 | Stop reason: HOSPADM

## 2018-03-04 RX ORDER — FAMOTIDINE 10 MG/ML
20 INJECTION INTRAVENOUS
Status: COMPLETED | OUTPATIENT
Start: 2018-03-04 | End: 2018-03-04

## 2018-03-04 RX ADMIN — SODIUM CHLORIDE 150 ML/HR: 900 INJECTION, SOLUTION INTRAVENOUS at 17:02

## 2018-03-04 RX ADMIN — FAMOTIDINE 20 MG: 10 INJECTION INTRAVENOUS at 17:02

## 2018-03-04 NOTE — ED TRIAGE NOTES
Pt c/o mid epigastric discomfort started last night,denies nausea,vomiting,fever,chills,blurry vision. Pt BP high took evening blood pressure med around 0345pm.

## 2018-03-04 NOTE — ED PROVIDER NOTES
EMERGENCY DEPARTMENT HISTORY AND PHYSICAL EXAM      Date: 3/4/2018  Patient Name: Elza Medellin    History of Presenting Illness     Chief Complaint   Patient presents with    Epigastric Pain     pt c/o epigastric pain started last night. History Provided By: Patient    HPI: Elza Medellin, 47 y.o. male with PMHx significant for anxiety, HTN, CHF, mitral incompetence in 2013, CKD, presents ambulatory to the ED with cc of mild upper mid chest pain since last night that is exacerbated with laying down. Pt states that his CP is like a fullness and \"feels like indigestion\". He has had an echocardiogram in 2011 that was normal. Pt denies any SOB and nausea. PCP: Blake Brown MD    There are no other complaints, changes, or physical findings at this time. Current Facility-Administered Medications   Medication Dose Route Frequency Provider Last Rate Last Dose    sodium chloride (NS) flush 5-10 mL  5-10 mL IntraVENous Q8H Devin Gutierres MD        sodium chloride (NS) flush 5-10 mL  5-10 mL IntraVENous PRN Devin Gutierres MD        0.9% sodium chloride infusion  150 mL/hr IntraVENous CONTINUOUS Devin Gutierres  mL/hr at 03/04/18 1702 150 mL/hr at 03/04/18 1702     Current Outpatient Prescriptions   Medication Sig Dispense Refill    hydrALAZINE (APRESOLINE) 25 mg tablet Take 25 mg by mouth three (3) times daily.  cyclobenzaprine (FLEXERIL) 5 mg tablet       sertraline (ZOLOFT) 100 mg tablet Take 1/2 tablet by mouth daily x 1 week and then take 1 whole tablet daily. 30 Tab 0    buPROPion XL (WELLBUTRIN XL) 150 mg tablet Take 1 Tab by mouth every morning. 30 Tab 0    aspirin 81 mg tablet Take 1 Tab by mouth daily. 30 Tab 11    amLODIPine (NORVASC) 10 mg tablet TAKE 1 TABLET BY MOUTH DAILY 30 Tab 11    losartan (COZAAR) 25 mg tablet Take  by mouth daily.  ibuprofen (MOTRIN) 800 mg tablet Take 1 Tab by mouth every eight (8) hours as needed for Pain.  30 Tab 0    lidocaine (LIDODERM) 5 % Apply patch to the affected area for 12 hours a day and remove for 12 hours a day. 5 Each 0    tiZANidine (ZANAFLEX) 2 mg tablet TAKE 1 TO 2 TABLETS PO THREE TIMES A DAY AS NEEDED FOR SPASMS 30 Tab 3    tadalafil (CIALIS) 20 mg tablet TAKE 1 TABLET BY MOUTH AS NEEDED 8 Tab 6    pyridoxine, vitamin B6, (VITAMIN B-6) 100 mg tablet Take 1 Tab by mouth daily. 30 Tab 0    methocarbamol (ROBAXIN) 750 mg tablet Take  by mouth three (3) times daily.  nabumetone (RELAFEN) 500 mg tablet Take  by mouth two (2) times a day.  HYDROcodone-acetaminophen (NORCO) 5-325 mg per tablet Take 1 Tab by mouth every six (6) hours as needed.  cloNIDine HCl (CATAPRES) 0.1 mg tablet Take 0.1 mg by mouth every evening.  gabapentin (NEURONTIN) 400 mg capsule Take 400 mg by mouth three (3) times daily.  multivitamin (ONE A DAY) tablet Take 1 Tab by mouth daily.  Cholecalciferol, Vitamin D3, 5,000 unit Tab Take 10,000 Int'l Units by mouth daily. 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 r/t PTSD- last seizure 2011       Past Surgical History:  Past Surgical History:   Procedure Laterality Date    HX ORTHOPAEDIC  2007    cervical anterior discectomy and fusion--Dr. Vanna Tarango       Family History:  Family History   Problem Relation Age of Onset    Diabetes Mother     Diabetes Father     Heart Disease Father     Cancer Father      prostate cancer    Hypertension Sister        Social History:  Social History   Substance Use Topics    Smoking status: Former Smoker     Packs/day: 0.30     Types: Cigarettes    Smokeless tobacco: Never Used    Alcohol use No       Allergies: Allergies   Allergen Reactions    Morphine Itching         Review of Systems   Review of Systems   Constitutional: Negative for fever. HENT: Negative for sore throat and trouble swallowing. Eyes: Negative for photophobia and redness. Respiratory: Negative for cough and shortness of breath. Cardiovascular: Positive for chest pain. Negative for leg swelling. Gastrointestinal: Negative for abdominal pain, constipation, diarrhea, nausea and vomiting. Endocrine: Negative for polydipsia and polyuria. Genitourinary: Negative for dysuria, hematuria and scrotal swelling. Musculoskeletal: Negative for back pain and joint swelling. Skin: Negative for rash. Neurological: Negative for dizziness, syncope, weakness and headaches. Psychiatric/Behavioral: Negative for suicidal ideas. All other systems reviewed and are negative. Physical Exam   Physical Exam   Constitutional: He is oriented to person, place, and time. He appears well-developed and well-nourished. No distress. HENT:   Head: Normocephalic and atraumatic. Mouth/Throat: Oropharynx is clear and moist. No oropharyngeal exudate. Eyes: Conjunctivae and EOM are normal. Pupils are equal, round, and reactive to light. Left eye exhibits no discharge. Neck: Normal range of motion. Neck supple. No JVD present. Cardiovascular: Normal rate, regular rhythm, normal heart sounds and intact distal pulses. Pulmonary/Chest: Effort normal and breath sounds normal. No respiratory distress. He has no wheezes. Abdominal: Soft. Bowel sounds are normal. He exhibits no distension. There is no tenderness. There is no rebound and no guarding. Musculoskeletal: Normal range of motion. He exhibits no edema or tenderness. Lymphadenopathy:     He has no cervical adenopathy. Neurological: He is alert and oriented to person, place, and time. He has normal reflexes. No cranial nerve deficit.    Skin: Skin is warm and dry. No rash noted. Psychiatric: He has a normal mood and affect. His behavior is normal.   Nursing note and vitals reviewed. Diagnostic Study Results     Labs -     Recent Results (from the past 12 hour(s))   CBC WITH AUTOMATED DIFF    Collection Time: 03/04/18  4:53 PM   Result Value Ref Range    WBC 11.5 (H) 4.1 - 11.1 K/uL    RBC 4.43 4.10 - 5.70 M/uL    HGB 13.0 12.1 - 17.0 g/dL    HCT 38.6 36.6 - 50.3 %    MCV 87.1 80.0 - 99.0 FL    MCH 29.3 26.0 - 34.0 PG    MCHC 33.7 30.0 - 36.5 g/dL    RDW 13.6 11.5 - 14.5 %    PLATELET 637 846 - 954 K/uL    MPV 10.0 8.9 - 12.9 FL    NRBC 0.0 0  WBC    ABSOLUTE NRBC 0.00 0.00 - 0.01 K/uL    NEUTROPHILS 51 32 - 75 %    LYMPHOCYTES 42 12 - 49 %    MONOCYTES 6 5 - 13 %    EOSINOPHILS 1 0 - 7 %    BASOPHILS 0 0 - 1 %    IMMATURE GRANULOCYTES 0 0.0 - 0.5 %    ABS. NEUTROPHILS 5.8 1.8 - 8.0 K/UL    ABS. LYMPHOCYTES 4.9 (H) 0.8 - 3.5 K/UL    ABS. MONOCYTES 0.6 0.0 - 1.0 K/UL    ABS. EOSINOPHILS 0.1 0.0 - 0.4 K/UL    ABS. BASOPHILS 0.0 0.0 - 0.1 K/UL    ABS. IMM.  GRANS. 0.0 0.00 - 0.04 K/UL    DF AUTOMATED     METABOLIC PANEL, BASIC    Collection Time: 03/04/18  4:53 PM   Result Value Ref Range    Sodium 144 136 - 145 mmol/L    Potassium 4.0 3.5 - 5.1 mmol/L    Chloride 106 97 - 108 mmol/L    CO2 28 21 - 32 mmol/L    Anion gap 10 5 - 15 mmol/L    Glucose 121 (H) 65 - 100 mg/dL    BUN 21 (H) 6 - 20 MG/DL    Creatinine 1.67 (H) 0.70 - 1.30 MG/DL    BUN/Creatinine ratio 13 12 - 20      GFR est AA 53 (L) >60 ml/min/1.73m2    GFR est non-AA 44 (L) >60 ml/min/1.73m2    Calcium 8.9 8.5 - 10.1 MG/DL   TROPONIN I    Collection Time: 03/04/18  4:53 PM   Result Value Ref Range    Troponin-I, Qt. <0.04 <0.05 ng/mL   CK W/ CKMB & INDEX    Collection Time: 03/04/18  4:53 PM   Result Value Ref Range     39 - 308 U/L    CK - MB 1.0 <3.6 NG/ML    CK-MB Index 0.4 0.0 - 2.5         Radiologic Studies -     CXR Results  (Last 48 hours)               03/04/18 1708  XR CHEST PA LAT Final result    Impression:  Impression:   No acute cardiopulmonary process. Narrative:  Chest PA and lateral       History: Short of breath       Comparison: 9/18/2015       Findings: The lungs are well expanded. No focal consolidation, pleural   effusion, or pneumothorax. The cardiomediastinal silhouette is unremarkable. The visualized osseous structures are unremarkable. Medical Decision Making   I am the first provider for this patient. I reviewed the vital signs, available nursing notes, past medical history, past surgical history, family history and social history. Vital Signs-Reviewed the patient's vital signs. Patient Vitals for the past 12 hrs:   Temp Pulse Resp BP SpO2   03/04/18 1622 98 °F (36.7 °C) 91 18 (!) 173/104 99 %       Pulse Oximetry Analysis - 99% on room air    Cardiac Monitor:   Rate: 91 bpm  Rhythm: Normal Sinus Rhythm 173/104     EKG interpretation: (Preliminary)  4:44 PM  Rhythm: normal sinus rhythm and incomplete RBBB with left anterior fascicular block; and regular . Rate (approx.): 91; Axis: normal; ND interval: normal; QRS interval: normal ; ST/T wave: normal; Other findings: abnormal ekg. Written by Dann Young, ED Scribe, as dictated by Elaina Newell MD.    Records Reviewed: Nursing Notes and Old Medical Records    Provider Notes (Medical Decision Making):   DDx: PNA, costochondritis, GERD, pericarditis, ACS    ED Course:   Initial assessment performed. The patients presenting problems have been discussed, and they are in agreement with the care plan formulated and outlined with them. I have encouraged them to ask questions as they arise throughout their visit. Disposition:  DISCHARGE NOTE  5:43 PM  The patient has been re-evaluated and is ready for discharge. Reviewed available results with patient. Counseled patient on diagnosis and care plan. Patient has expressed understanding, and all questions have been answered.  Patient agrees with plan and agrees to follow up as recommended, or return to the ED if their symptoms worsen. Discharge instructions have been provided and explained to the patient, along with reasons to return to the ED. PLAN:  1. Current Discharge Medication List        2. Follow-up Information     Follow up With Details Comments 321 Alvin Conway MD In 1 week  [id number] [address]  [address] [place] [address]  [phone]          Return to ED if worse     Diagnosis     Clinical Impression:   1. Atypical chest pain    2. Renal insufficiency        Attestations: This note is prepared by Sina Cummings, acting as Scribe for Aura Moctezuma MD.    Aura Moctezuma MD: The scribe's documentation has been prepared under my direction and personally reviewed by me in its entirety. I confirm that the note above accurately reflects all work, treatment, procedures, and medical decision making performed by me.

## 2018-03-04 NOTE — ED NOTES
..Assessment note written by Tiffany Hunter, student nurse. Cosigned by sophie houston RN. I agree and have reviewed this chart.

## 2018-03-05 ENCOUNTER — OFFICE VISIT (OUTPATIENT)
Dept: INTERNAL MEDICINE CLINIC | Age: 53
End: 2018-03-05

## 2018-03-05 VITALS
DIASTOLIC BLOOD PRESSURE: 90 MMHG | WEIGHT: 170 LBS | SYSTOLIC BLOOD PRESSURE: 128 MMHG | OXYGEN SATURATION: 99 % | HEART RATE: 74 BPM | TEMPERATURE: 98.4 F | HEIGHT: 73 IN | BODY MASS INDEX: 22.53 KG/M2

## 2018-03-05 DIAGNOSIS — F41.9 ANXIETY: Primary | ICD-10-CM

## 2018-03-05 DIAGNOSIS — R07.9 CHEST PAIN, UNSPECIFIED TYPE: ICD-10-CM

## 2018-03-05 DIAGNOSIS — N18.30 CKD (CHRONIC KIDNEY DISEASE) STAGE 3, GFR 30-59 ML/MIN (HCC): ICD-10-CM

## 2018-03-05 RX ORDER — LORAZEPAM 0.5 MG/1
0.5 TABLET ORAL
Qty: 10 TAB | Refills: 0 | Status: SHIPPED | OUTPATIENT
Start: 2018-03-05 | End: 2019-02-08 | Stop reason: SDUPTHER

## 2018-03-05 RX ORDER — OMEPRAZOLE 20 MG/1
20 CAPSULE, DELAYED RELEASE ORAL DAILY
Qty: 30 CAP | Refills: 3 | Status: SHIPPED | OUTPATIENT
Start: 2018-03-05 | End: 2018-03-05 | Stop reason: SDUPTHER

## 2018-03-05 RX ORDER — OMEPRAZOLE 20 MG/1
CAPSULE, DELAYED RELEASE ORAL
Qty: 90 CAP | Refills: 3 | Status: SHIPPED | OUTPATIENT
Start: 2018-03-05 | End: 2019-02-08 | Stop reason: SDUPTHER

## 2018-03-05 RX ORDER — TIZANIDINE 2 MG/1
TABLET ORAL
Qty: 540 TAB | Refills: 3 | Status: SHIPPED | OUTPATIENT
Start: 2018-03-05 | End: 2019-03-07 | Stop reason: SDUPTHER

## 2018-03-05 RX ORDER — TIZANIDINE 2 MG/1
TABLET ORAL
Qty: 30 TAB | Refills: 3 | Status: SHIPPED | OUTPATIENT
Start: 2018-03-05 | End: 2018-03-05 | Stop reason: SDUPTHER

## 2018-03-05 NOTE — PROGRESS NOTES
Patient is here today for blood pressure check and follow up from  [address] [place] [address] 3/4/18 for atypical chest pain.

## 2018-03-05 NOTE — MR AVS SNAPSHOT
[address] 
[phone] Patient: Shoshana Waldrop MRN: [id number] :1965 Visit Information Date & Time Provider Department Dept. Phone Encounter #  
 3/5/2018  2:30 PM [place] [facility], [address] [place] [address],[address] [phone] [id number] Follow-up Instructions Return in about 4 months (around 2018) for prediabetes htn psa. Upcoming Health Maintenance Date Due  
 MEDICARE YEARLY EXAM 2018 COLONOSCOPY 2020 DTaP/Tdap/Td series (2 - Td) 10/5/2027 Allergies as of 3/5/2018  Review Complete On: 3/5/2018 By: Matthew Arreguin LPN Severity Noted Reaction Type Reactions Morphine  2013    Itching Current Immunizations  Reviewed on 2012 Name Date Influenza Vaccine 2014, 2012 11:16 AM  
 Influenza Vaccine (Quad) PF 10/5/2017 TB Skin Test (PPD) Intradermal 2015 Td 2009 Tdap 10/5/2017 Not reviewed this visit You Were Diagnosed With   
  
 Codes Comments Anxiety    -  Primary ICD-10-CM: F41.9 ICD-9-CM: 300.00 Chest pain, unspecified type     ICD-10-CM: R07.9 ICD-9-CM: 786.50 CKD (chronic kidney disease) stage 3, GFR 30-59 ml/min     ICD-10-CM: N18.3 ICD-9-CM: 155. 3 Vitals BP Pulse Temp Height(growth percentile) Weight(growth percentile) SpO2  
 128/90 74 98.4 °F (36.9 °C) (Oral) 6' 1\" (1.854 m) 170 lb (77.1 kg) 99% BMI Smoking Status 22.43 kg/m2 Former Smoker Vitals History BMI and BSA Data Body Mass Index Body Surface Area  
 22.43 kg/m 2 1.99 m 2 Preferred Pharmacy Pharmacy Name Phone [facility] [phone] [facility], [address] [place] [address] [phone] Your Updated Medication List  
  
   
This list is accurate as of 3/5/18  3:10 PM.  Always use your most recent med list.  
  
  
  
  
 amLODIPine 10 mg tablet Commonly known as:  Federico Free TAKE 1 TABLET BY MOUTH DAILY  
  
 aspirin 81 mg tablet Take 1 Tab by mouth daily. buPROPion  mg tablet Commonly known as:  Manjit Radha Take 1 Tab by mouth every morning. cholecalciferol (VITAMIN D3) 5,000 unit Tab tablet Commonly known as:  VITAMIN D3 Take 10,000 Int'l Units by mouth daily. Indications: VITAMIN D DEFICIENCY  
  
 cloNIDine HCl 0.1 mg tablet Commonly known as:  CATAPRES Take 0.1 mg by mouth every evening. cyclobenzaprine 5 mg tablet Commonly known as:  FLEXERIL  
  
 gabapentin 400 mg capsule Commonly known as:  NEURONTIN Take 400 mg by mouth three (3) times daily. hydrALAZINE 25 mg tablet Commonly known as:  APRESOLINE Take 25 mg by mouth three (3) times daily. HYDROcodone-acetaminophen 5-325 mg per tablet Commonly known as:  Sonia Records Take 1 Tab by mouth every six (6) hours as needed. ibuprofen 800 mg tablet Commonly known as:  MOTRIN Take 1 Tab by mouth every eight (8) hours as needed for Pain.  
  
 lidocaine 5 % Commonly known as:  Jose Francisco Man Apply patch to the affected area for 12 hours a day and remove for 12 hours a day. LORazepam 0.5 mg tablet Commonly known as:  ATIVAN Take 1 Tab by mouth every eight (8) hours as needed for Anxiety. Max Daily Amount: 1.5 mg.  
  
 losartan 25 mg tablet Commonly known as:  COZAAR Take  by mouth daily. methocarbamol 750 mg tablet Commonly known as:  ROBAXIN Take  by mouth three (3) times daily. multivitamin tablet Commonly known as:  ONE A DAY Take 1 Tab by mouth daily. nabumetone 500 mg tablet Commonly known as:  RELAFEN Take  by mouth two (2) times a day. omeprazole 20 mg capsule Commonly known as:  PRILOSEC Take 1 Cap by mouth daily. pyridoxine (vitamin B6) 100 mg tablet Commonly known as:  VITAMIN B-6 Take 1 Tab by mouth daily. sertraline 100 mg tablet Commonly known as:  ZOLOFT Take 1/2 tablet by mouth daily x 1 week and then take 1 whole tablet daily. tadalafil 20 mg tablet Commonly known as:  CIALIS  
TAKE 1 TABLET BY MOUTH AS NEEDED  
  
 tiZANidine 2 mg tablet Commonly known as:  Pito Eth TAKE 1 TO 2 TABLETS PO THREE TIMES A DAY AS NEEDED FOR SPASMS Prescriptions Printed Refills LORazepam (ATIVAN) 0.5 mg tablet 0 Sig: Take 1 Tab by mouth every eight (8) hours as needed for Anxiety. Max Daily Amount: 1.5 mg.  
 Class: Print Route: Oral  
  
Prescriptions Sent to Pharmacy Refills  
 tiZANidine (ZANAFLEX) 2 mg tablet 3 Sig: TAKE 1 TO 2 TABLETS PO THREE TIMES A DAY AS NEEDED FOR SPASMS Class: Normal  
 Pharmacy: [facility] Ph #: [phone]  
 omeprazole (PRILOSEC) 20 mg capsule 3 Sig: Take 1 Cap by mouth daily. Class: Normal  
 Pharmacy: [facility] [address] Ph #: [phone] Route: Oral  
  
Follow-up Instructions Return in about 4 months (around 7/5/2018) for prediabetes htn psa. Introducing [facility]! Dear Evelin Dawkins: 
Thank you for requesting a [employer] account. Our records indicate that you already have an active [employer] account. You can access your account anytime at [email or web address] Did you know that you can access your hospital and ER discharge instructions at any time in [employer]? You can also review all of your test results from your hospital stay or ER visit. Additional Information If you have questions, please visit the Frequently Asked Questions section of the [employer] website at [email or web address] Remember, [employer] is NOT to be used for urgent needs. For medical emergencies, dial 911. Now available from your iPhone and Android! Please provide this summary of care documentation to your next provider. Your primary care clinician is listed as Oliver BURDICK. If you have any questions after today's visit, please call [phone].
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
## 2018-03-06 LAB
ATRIAL RATE: 91 BPM
CALCULATED P AXIS, ECG09: 67 DEGREES
CALCULATED R AXIS, ECG10: -54 DEGREES
CALCULATED T AXIS, ECG11: 48 DEGREES
DIAGNOSIS, 93000: NORMAL
P-R INTERVAL, ECG05: 162 MS
Q-T INTERVAL, ECG07: 352 MS
QRS DURATION, ECG06: 96 MS
QTC CALCULATION (BEZET), ECG08: 432 MS
VENTRICULAR RATE, ECG03: 91 BPM

## 2018-03-22 ENCOUNTER — HOSPITAL ENCOUNTER (OUTPATIENT)
Dept: PHYSICAL THERAPY | Age: 53
Discharge: HOME | End: 2018-03-22
Payer: MEDICARE

## 2018-03-22 PROCEDURE — 97161 PT EVAL LOW COMPLEX 20 MIN: CPT | Performed by: PHYSICAL THERAPIST

## 2018-03-22 PROCEDURE — 97140 MANUAL THERAPY 1/> REGIONS: CPT | Performed by: PHYSICAL THERAPIST

## 2018-03-22 PROCEDURE — G8981 BODY POS CURRENT STATUS: HCPCS | Performed by: PHYSICAL THERAPIST

## 2018-03-22 PROCEDURE — 97110 THERAPEUTIC EXERCISES: CPT | Performed by: PHYSICAL THERAPIST

## 2018-03-22 PROCEDURE — G8982 BODY POS GOAL STATUS: HCPCS | Performed by: PHYSICAL THERAPIST
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
## 2018-03-23 NOTE — PROGRESS NOTES
[facility]  [id number] [address], [address] [place] [address], [address]  Phone: [phone]  Fax: [phone]      Plan of Care/Statement of Necessity for Physical Therapy Services  2-15    Patient name: Elza Medellin  : 1965  Provider#: [id number]  Referral source: Ezio Demarco MD      Medical/Treatment Diagnosis: Neck pain [M54.2]     Prior Hospitalization: see medical history     Comorbidities: see medical hx  Prior Level of Function: no regular exercises  Medications: Verified on Patient Summary List  Start of Care: 3/22/18      Onset Date:   The [address] [place] [address] and following information is based on the information from the initial evaluation. Assessment/ key information: The pt presents with s/s consistent with cervical radiculopathy. He has decreased AROM, postural dysfunction, decreased pectoralis, UT, levator flexibility and turgor alongR > L. The pt would benefit from skilled physical therapy in order to address these impairments and to return him to maximal level of function pain free.       Evaluation Complexity History MEDIUM  Complexity : 1-2 comorbidities / personal factors will impact the outcome/ POC ; Examination MEDIUM Complexity : 3 Standardized tests and measures addressing body structure, function, activity limitation and / or participation in recreation  ;Presentation LOW Complexity : Stable, uncomplicated  ;Clinical Decision Making MEDIUM Complexity : FOTO score of 26-74  Overall Complexity Rating: LOW     Problem List: pain affecting function, decrease ROM, decrease strength, decrease ADL/ functional abilitiies, decrease activity tolerance and decrease flexibility/ joint mobility   Treatment Plan may include any combination of the following: Therapeutic exercise, Therapeutic activities, Neuromuscular re-education, Physical agent/modality, Manual therapy and Patient education  Patient / Family readiness to learn indicated by: asking questions, trying to perform skills and interest  Persons(s) to be included in education: patient (P)  Barriers to Learning/Limitations: None  Patient Goal (s): decrease pain  Patient Self Reported Health Status: good  Rehabilitation Potential: good    Short Term Goals: To be accomplished in 4 weeks:  1) Pt will be independent with HEP  2) Pt will be able to sit with upright posture >/= 5 minutes without increase of symptoms  3) Pt will report improvement in cervical Rotation to look over shoulders while driving. Long Term Goals: To be accomplished in 8 weeks:  1) Pt will be able to read without increase of neck pain  2) Pt will be able to look over shoulders while driving without increase of neck pain  3) Pt will demonstrate ability to lift >/= 20 lbs without increase of symptoms  4) Pt will be able to sleep through the night without waking in pain  5) Pt will be able to rise from supine to sitting without head lag or pain. Frequency / Duration: Patient to be seen 2 times per week for 4 weeks. Patient/ Caregiver education and instruction: activity modification and exercises    [x]  Plan of care has been reviewed with STEPHANIE    G-Reza (GP)      Position   Current  CL= 60-79%   Goal  CK= 40-59%      The severity rating is based on clinical judgment and the FOTO Score     Certification Period: 3/22/18 -6/22/18    Ruby John 3/23/2018 2:07 PM    ________________________________________________________________________    I certify that the above Therapy Services are being furnished while the patient is under my care. I agree with the treatment plan and certify that this therapy is necessary.     [address] [facility] Signature:____________________  Date:____________Time: _________

## 2018-04-03 ENCOUNTER — HOSPITAL ENCOUNTER (OUTPATIENT)
Dept: PHYSICAL THERAPY | Age: 53
Discharge: HOME | End: 2018-04-03
Payer: MEDICARE

## 2018-04-03 PROCEDURE — 97110 THERAPEUTIC EXERCISES: CPT | Performed by: PHYSICAL THERAPIST

## 2018-04-03 PROCEDURE — 97140 MANUAL THERAPY 1/> REGIONS: CPT | Performed by: PHYSICAL THERAPIST

## 2018-04-03 NOTE — PROGRESS NOTES
PT DAILY TREATMENT NOTE - [facility] 2-15    Patient Name: Verona Morrow  Date:4/3/2018  : 1965  [x]  Patient  Verified  Payor: [employer] MEDICARE COMPLETE / Plan: [facility] MEDICARE COMPLETE / Product Type: Managed Care Medicare /    In time: 1:00p Out time:1:45p  Total Treatment Time (min): 45  Total Timed Codes (min): 45  1:1 Treatment Time ( only): 30   Visit #: 2    Treatment Area: Neck pain [M54.2]    SUBJECTIVE  Pain Level (0-10 scale): 7/10  Any medication changes, allergies to medications, adverse drug reactions, diagnosis change, or new procedure performed?: [x] No    [] Yes (see summary sheet for update)  Subjective functional status/changes:   [] No changes reported  \"Took a lot of medications so pain isn't that bad right now\" reporting 7/10  OBJECTIVE    20 min Therapeutic Exercise:  [x] See flow sheet :   Rationale: increase ROM, increase strength, improve coordination and increase proprioception to improve the patients ability to perform daily activities pain free.      20 min Manual Therapy: PROM in all planes, cervical distraction. Rationale: decrease pain, increase ROM, increase tissue extensibility, decrease trigger points and increase postural awareness to improve the patients ability to perform daily activities pain free.          With   [] TE   [] TA   [] neuro   [] other: Patient Education: [x] Review HEP    [] Progressed/Changed HEP based on:   [] positioning   [] body mechanics   [] transfers   [] heat/ice application    [] other:       Other Objective/Functional Measures:FOTO     Pain Level (0-10 scale) post treatment: 8/10      ASSESSMENT/Changes in Function:   Tolerated addition of exercises today, declined modality at the end. Felt improvement with manual interventions.    Patient will continue to benefit from skilled PT services to modify and progress therapeutic interventions, address functional mobility deficits, address ROM deficits, address strength deficits, analyze and address soft tissue restrictions, analyze and cue movement patterns, analyze and modify body mechanics/ergonomics and assess and modify postural abnormalities to attain remaining goals. [x]  See Plan of Care  []  See progress note/recertification  []  See Discharge Summary         Progress towards goals / Updated goals:  Short Term Goals: To be accomplished in 4 weeks:  1) Pt will be independent with HEP  2) Pt will be able to sit with upright posture >/= 5 minutes without increase of symptoms  3) Pt will report improvement in cervical Rotation to look over shoulders while driving.     Long Term Goals:  To be accomplished in 8 weeks:  1) Pt will be able to read without increase of neck pain  2) Pt will be able to look over shoulders while driving without increase of neck pain  3) Pt will demonstrate ability to lift >/= 20 lbs without increase of symptoms  4) Pt will be able to sleep through the night without waking in pain  5) Pt will be able to rise from supine to sitting without head lag or pain.     Frequency / Duration: Patient to be seen 2 times per week for 4 weeks.       PLAN  []  Upgrade activities as tolerated     []  Continue plan of care  []  Update interventions per flow sheet       []  Discharge due to:_  []  Other:_      Rosemary Wolfe 4/3/2018  2:43 PM

## 2018-04-10 ENCOUNTER — HOSPITAL ENCOUNTER (OUTPATIENT)
Dept: PHYSICAL THERAPY | Age: 53
Discharge: HOME | End: 2018-04-10
Payer: MEDICARE

## 2018-04-10 PROCEDURE — 97140 MANUAL THERAPY 1/> REGIONS: CPT

## 2018-04-10 PROCEDURE — 97110 THERAPEUTIC EXERCISES: CPT

## 2018-04-10 NOTE — PROGRESS NOTES
PT DAILY TREATMENT NOTE - [facility] 2-15    Patient Name: Deonte Hardwick  Date:4/10/2018  : 1965  [x]  Patient  Verified  Payor: [employer] MEDICARE COMPLETE / [facility] / Product Type: Managed Care Medicare /    In time: 1:15P Out time: 2:15P  Total Treatment Time (min): 60  Total Timed Codes (min): 60  1:1 Treatment Time ( only): 30   Visit #: 3    Treatment Area: Neck pain [M54.2]    SUBJECTIVE  Pain Level (0-10 scale): 7/10  Any medication changes, allergies to medications, adverse drug reactions, diagnosis change, or new procedure performed?: [x] No    [] Yes (see summary sheet for update)  Subjective functional status/changes:   [] No changes reported  Pain is higher secondary to missing last session. OBJECTIVE    45 min Therapeutic Exercise:  [x] See flow sheet :   Rationale: increase ROM, increase strength, improve coordination and increase proprioception to improve the patients ability to perform daily activities pain free.      15 min Manual Therapy: PROM in all planes, cervical distraction. Rationale: decrease pain, increase ROM, increase tissue extensibility, decrease trigger points and increase postural awareness to improve the patients ability to perform daily activities pain free.      With   [] TE   [] TA   [] neuro   [] other: Patient Education: [x] Review HEP    [] Progressed/Changed HEP based on:   [] positioning   [] body mechanics   [] transfers   [] heat/ice application    [] other:       Other Objective/Functional Measures:--     Pain Level (0-10 scale) post treatment: 7/10      ASSESSMENT/Changes in Function:   Pt reported feeling better following manual therapy today. Declined modalities.    Patient will continue to benefit from skilled PT services to modify and progress therapeutic interventions, address functional mobility deficits, address ROM deficits, address strength deficits, analyze and address soft tissue restrictions, analyze and cue movement patterns, analyze and modify body mechanics/ergonomics and assess and modify postural abnormalities to attain remaining goals. [x]  See Plan of Care  []  See progress note/recertification  []  See Discharge Summary         Progress towards goals / Updated goals:  Short Term Goals: To be accomplished in 4 weeks:  1) Pt will be independent with HEP  2) Pt will be able to sit with upright posture >/= 5 minutes without increase of symptoms  3) Pt will report improvement in cervical Rotation to look over shoulders while driving.     Long Term Goals:  To be accomplished in 8 weeks:  1) Pt will be able to read without increase of neck pain  2) Pt will be able to look over shoulders while driving without increase of neck pain  3) Pt will demonstrate ability to lift >/= 20 lbs without increase of symptoms  4) Pt will be able to sleep through the night without waking in pain  5) Pt will be able to rise from supine to sitting without head lag or pain.     Frequency / Duration: Patient to be seen 2 times per week for 4 weeks.       PLAN  [x]  Upgrade activities as tolerated     [x]  Continue plan of care  []  Update interventions per flow sheet       []  Discharge due to:_  []  Other:_      Viviana Peerz, PTA 4/10/2018  2:43 PM

## 2018-04-12 ENCOUNTER — APPOINTMENT (OUTPATIENT)
Dept: PHYSICAL THERAPY | Age: 53
End: 2018-04-12
Payer: MEDICARE

## 2018-04-17 ENCOUNTER — APPOINTMENT (OUTPATIENT)
Dept: PHYSICAL THERAPY | Age: 53
End: 2018-04-17
Payer: MEDICARE

## 2018-04-19 ENCOUNTER — APPOINTMENT (OUTPATIENT)
Dept: PHYSICAL THERAPY | Age: 53
End: 2018-04-19
Payer: MEDICARE

## 2018-04-24 ENCOUNTER — APPOINTMENT (OUTPATIENT)
Dept: PHYSICAL THERAPY | Age: 53
End: 2018-04-24
Payer: MEDICARE

## 2018-04-26 ENCOUNTER — APPOINTMENT (OUTPATIENT)
Dept: PHYSICAL THERAPY | Age: 53
End: 2018-04-26
Payer: MEDICARE

## 2018-08-09 ENCOUNTER — OFFICE VISIT (OUTPATIENT)
Dept: INTERNAL MEDICINE CLINIC | Age: 53
End: 2018-08-09

## 2018-08-09 VITALS
HEIGHT: 73 IN | HEART RATE: 74 BPM | WEIGHT: 170 LBS | TEMPERATURE: 98.3 F | BODY MASS INDEX: 22.53 KG/M2 | SYSTOLIC BLOOD PRESSURE: 126 MMHG | DIASTOLIC BLOOD PRESSURE: 90 MMHG | OXYGEN SATURATION: 100 %

## 2018-08-09 DIAGNOSIS — R73.03 PREDIABETES: ICD-10-CM

## 2018-08-09 DIAGNOSIS — N18.30 CKD (CHRONIC KIDNEY DISEASE) STAGE 3, GFR 30-59 ML/MIN (HCC): ICD-10-CM

## 2018-08-09 DIAGNOSIS — Z12.5 PROSTATE CANCER SCREENING: ICD-10-CM

## 2018-08-09 DIAGNOSIS — I10 ESSENTIAL HYPERTENSION: Primary | ICD-10-CM

## 2018-08-09 DIAGNOSIS — F43.10 PTSD (POST-TRAUMATIC STRESS DISORDER): ICD-10-CM

## 2018-08-09 DIAGNOSIS — E78.00 PURE HYPERCHOLESTEROLEMIA: ICD-10-CM

## 2018-08-09 RX ORDER — DULOXETIN HYDROCHLORIDE 60 MG/1
60 CAPSULE, DELAYED RELEASE ORAL DAILY
COMMUNITY
End: 2020-11-25 | Stop reason: ALTCHOICE

## 2018-08-09 NOTE — PROGRESS NOTES
HISTORY OF PRESENT ILLNESS  Mo Aguilar is a 47 y.o. male. HPI      F/u HTN , hx hyperglycemia with FH DM-2, HLD.  PTSD     Getting PT and seeing Dr Yuly Betancourt and [facility] for PTSD, depression  cymbalta added and zoloft stopped per derek ROMERO    Had been on a better diet, not eating out anymore    ckd 3/solitary kideny, has not seen renal Dr Magalie Ashley this year  Last OV:  ED f/u for Chest pain at [facility] x 1 weeks, intermittently  Enzymes and ekg negative  Had CP at rest and worse with heaviness when supine   Had normal cardiac cath 2013  Went back to court for hearing on disability r/t neck and shoulder injurines and PTSD from trauma 2008-haivng some increased anxiety  pepcid helped while in ED.        See nephrologist q 6 mos f/u CKD-2/proteinuria--Dr Sumanth Mullins who has requested labs to be done--microalbumin, cbc bmp  Has been started on losartan by renal MD  [facility] nurse visit recently--a1c 6.0. admvised to get immunizations--tdap, flu shot, pneumovax and zostavax     Patient Active Problem List    Diagnosis Date Noted    CKD (chronic kidney disease) stage 3, GFR 30-59 ml/min 03/05/2018    Ulnar neuropathy at elbow of left upper extremity 09/07/2016    Cervical post-laminectomy syndrome 09/07/2016    Cervical radiculopathy due to degenerative joint disease of spine 09/07/2016    PPD positive 01/12/2015    ADD (attention deficit disorder with hyperactivity) 11/15/2011    Proteinuria 02/07/2011    FH: CAD (coronary artery disease) 12/16/2010    Solitary kidney 12/14/2010    Gout 12/14/2010    Elevated LFT's 12/14/2010    HTN (hypertension) 02/23/2010    Hyperglycemia 02/23/2010    PTSD (post-traumatic stress disorder) 02/23/2010    Chronic neck pain 02/23/2010    Anxiety 02/23/2010    Family history of diabetes mellitus 02/23/2010     Current Outpatient Prescriptions   Medication Sig Dispense Refill    LORazepam (ATIVAN) 0.5 mg tablet Take 1 Tab by mouth every eight (8) hours as needed for Anxiety. Max Daily Amount: 1.5 mg. 10 Tab 0    tiZANidine (ZANAFLEX) 2 mg tablet TAKE 1 TO 2 TABLETS BY MOUTH THREE TIMES DAILY AS NEEDED FOR SPASMS 540 Tab 3    omeprazole (PRILOSEC) 20 mg capsule TAKE ONE CAPSULE BY MOUTH DAILY 90 Cap 3    amLODIPine (NORVASC) 10 mg tablet TAKE 1 TABLET BY MOUTH DAILY 30 Tab 11    hydrALAZINE (APRESOLINE) 25 mg tablet Take 25 mg by mouth three (3) times daily.  losartan (COZAAR) 25 mg tablet Take  by mouth daily.  ibuprofen (MOTRIN) 800 mg tablet Take 1 Tab by mouth every eight (8) hours as needed for Pain. 30 Tab 0    lidocaine (LIDODERM) 5 % Apply patch to the affected area for 12 hours a day and remove for 12 hours a day. 5 Each 0    cyclobenzaprine (FLEXERIL) 5 mg tablet       tadalafil (CIALIS) 20 mg tablet TAKE 1 TABLET BY MOUTH AS NEEDED 8 Tab 6    pyridoxine, vitamin B6, (VITAMIN B-6) 100 mg tablet Take 1 Tab by mouth daily. 30 Tab 0    sertraline (ZOLOFT) 100 mg tablet Take 1/2 tablet by mouth daily x 1 week and then take 1 whole tablet daily. 30 Tab 0    buPROPion XL (WELLBUTRIN XL) 150 mg tablet Take 1 Tab by mouth every morning. 30 Tab 0    methocarbamol (ROBAXIN) 750 mg tablet Take  by mouth three (3) times daily.  nabumetone (RELAFEN) 500 mg tablet Take  by mouth two (2) times a day.  HYDROcodone-acetaminophen (NORCO) 5-325 mg per tablet Take 1 Tab by mouth every six (6) hours as needed.  cloNIDine HCl (CATAPRES) 0.1 mg tablet Take 0.1 mg by mouth every evening.  gabapentin (NEURONTIN) 400 mg capsule Take 400 mg by mouth three (3) times daily.  multivitamin (ONE A DAY) tablet Take 1 Tab by mouth daily.  Cholecalciferol, Vitamin D3, 5,000 unit Tab Take 10,000 Int'l Units by mouth daily. Indications: VITAMIN D DEFICIENCY      aspirin 81 mg tablet Take 1 Tab by mouth daily.  30 Tab 11     Allergies   Allergen Reactions    Morphine Itching     Social History   Substance Use Topics    Smoking status: Former Smoker     Packs/day: 0.30     Types: Cigarettes    Smokeless tobacco: Never Used    Alcohol use No      Lab Results  Component Value Date/Time   Hemoglobin A1c 5.7 (H) 10/05/2017 02:48 PM   Hemoglobin A1c 5.8 04/16/2010 12:06 PM   Glucose 121 (H) 03/04/2018 04:53 PM   Microalb/Creat ratio (ug/mg creat.) 482.4 (H) 10/05/2017 02:48 PM   LDL, calculated 149 (H) 10/05/2017 02:48 PM   Creatinine 1.67 (H) 03/04/2018 04:53 PM      Lab Results  Component Value Date/Time   Cholesterol, total 231 (H) 10/05/2017 02:48 PM   HDL Cholesterol 56 10/05/2017 02:48 PM   LDL, calculated 149 (H) 10/05/2017 02:48 PM   Triglyceride 129 10/05/2017 02:48 PM   CHOL/HDL Ratio 4.0 05/04/2013 07:00 AM     Lab Results  Component Value Date/Time   GFR est non-AA 44 (L) 03/04/2018 04:53 PM   GFR est AA 53 (L) 03/04/2018 04:53 PM   Creatinine 1.67 (H) 03/04/2018 04:53 PM   BUN 21 (H) 03/04/2018 04:53 PM   Sodium 144 03/04/2018 04:53 PM   Potassium 4.0 03/04/2018 04:53 PM   Chloride 106 03/04/2018 04:53 PM   CO2 28 03/04/2018 04:53 PM        ROS    Physical Exam   Constitutional: He appears well-developed and well-nourished. No distress. Appears stated age   HENT:   Head: Normocephalic. Cardiovascular: Normal rate, regular rhythm and normal heart sounds. Exam reveals no gallop and no friction rub. No murmur heard. Pulmonary/Chest: Effort normal and breath sounds normal. No respiratory distress. He has no wheezes. He has no rales. He exhibits no tenderness. Abdominal: Soft. He exhibits no distension and no mass. There is no tenderness. There is no rebound and no guarding. Musculoskeletal: He exhibits no edema. Neurological: He is alert. Psychiatric: He has a normal mood and affect. Nursing note and vitals reviewed. ASSESSMENT and PLAN  Diagnoses and all orders for this visit:    1. Essential hypertension   126/90   Continue medicines, low sodium diet  2.  Prediabetes  -     HEMOGLOBIN A1C WITH EAG     3. CKD (chronic kidney disease) stage 3, GFR 30-59 ml/min  -     METABOLIC PANEL, BASIC    4. PTSD (post-traumatic stress disorder)   F/u derek ROMERO  5. Prostate cancer screening  -     PSA SCREENING (SCREENING) ()    6. Pure hypercholesterolemia  -     LIPID PANEL   Managing with diet   Lipids a few years ago  WNL       Follow-up Disposition:  Return in about 6 months (around 2/9/2019) for htn hld ptas.

## 2018-08-09 NOTE — MR AVS SNAPSHOT
[address] [place] [address] 
[phone] Patient: Jefe Colon MRN: [id number] :1965 Visit Information Date & Time Provider Department Dept. Phone Encounter #  
 2018  3:45 PM Jaime Quan, [address] [place] [address],[address] [phone] [id number] Follow-up Instructions Return in about 6 months (around 2019) for htn hld ptas. Upcoming Health Maintenance Date Due Influenza Age 5 to Adult 2018 COLONOSCOPY 2020 DTaP/Tdap/Td series (2 - Td) 10/5/2027 Allergies as of 2018  Review Complete On: 2018 By: Jaime Quan MD  
  
 Severity Noted Reaction Type Reactions Morphine  2013    Itching Current Immunizations  Reviewed on 2012 Name Date Influenza Vaccine 2014, 2012 11:16 AM  
 Influenza Vaccine (Quad) PF 10/5/2017 TB Skin Test (PPD) Intradermal 2015 Td 2009 Tdap 10/5/2017 Not reviewed this visit You Were Diagnosed With   
  
 Codes Comments Essential hypertension    -  Primary ICD-10-CM: I10 
ICD-9-CM: 401.9 Prediabetes     ICD-10-CM: R73.03 
ICD-9-CM: 790.29 CKD (chronic kidney disease) stage 3, GFR 30-59 ml/min     ICD-10-CM: N18.3 ICD-9-CM: 585.3 PTSD (post-traumatic stress disorder)     ICD-10-CM: F43.10 ICD-9-CM: 309.81 Prostate cancer screening     ICD-10-CM: Z12.5 ICD-9-CM: V76.44 Pure hypercholesterolemia     ICD-10-CM: E78.00 ICD-9-CM: 272.0 Vitals BP Pulse Temp Height(growth percentile) Weight(growth percentile) SpO2  
 126/90 74 98.3 °F (36.8 °C) (Oral) 6' 1\" (1.854 m) 170 lb (77.1 kg) 100% BMI Smoking Status 22.43 kg/m2 Former Smoker Vitals History BMI and BSA Data Body Mass Index Body Surface Area  
 22.43 kg/m 2 1.99 m 2 Preferred Pharmacy [facility]acy Name Phone [facility] [address], [address] [place] [address] [phone] Your Updated Medication List  
  
   
This list is accurate as of 8/9/18  4:24 PM.  Always use your most recent med list. amLODIPine 10 mg tablet Commonly known as:  Lonnie Mcclure TAKE 1 TABLET BY MOUTH DAILY  
  
 aspirin 81 mg tablet Take 1 Tab by mouth daily. cholecalciferol (VITAMIN D3) 5,000 unit Tab tablet Commonly known as:  VITAMIN D3 Take 10,000 Int'l Units by mouth daily. Indications: VITAMIN D DEFICIENCY  
  
 CYMBALTA 60 mg capsule Generic drug:  DULoxetine Take 60 mg by mouth daily. gabapentin 400 mg capsule Commonly known as:  NEURONTIN Take 400 mg by mouth three (3) times daily. hydrALAZINE 25 mg tablet Commonly known as:  APRESOLINE Take 25 mg by mouth two (2) times a day. ibuprofen 800 mg tablet Commonly known as:  MOTRIN Take 1 Tab by mouth every eight (8) hours as needed for Pain.  
  
 lidocaine 5 % Commonly known as:  Petra Host Apply patch to the affected area for 12 hours a day and remove for 12 hours a day. LORazepam 0.5 mg tablet Commonly known as:  ATIVAN Take 1 Tab by mouth every eight (8) hours as needed for Anxiety. Max Daily Amount: 1.5 mg.  
  
 losartan 25 mg tablet Commonly known as:  COZAAR Take  by mouth daily. multivitamin tablet Commonly known as:  ONE A DAY Take 1 Tab by mouth daily. omeprazole 20 mg capsule Commonly known as:  PRILOSEC  
TAKE ONE CAPSULE BY MOUTH DAILY pyridoxine (vitamin B6) 100 mg tablet Commonly known as:  VITAMIN B-6 Take 1 Tab by mouth daily. tadalafil 20 mg tablet Commonly known as:  CIALIS  
TAKE 1 TABLET BY MOUTH AS NEEDED  
  
 tiZANidine 2 mg tablet Commonly known as:  Aretha Manual TAKE 1 TO 2 TABLETS BY MOUTH THREE TIMES DAILY AS NEEDED FOR SPASMS We Performed the Following HEMOGLOBIN A1C WITH EAG [35382 CPT(R)] LIPID PANEL [82026 CPT(R)] METABOLIC PANEL, BASIC [88498 CPT(R)] PSA SCREENING (SCREENING) [ [facility]] Follow-up Instructions Return in about 6 months (around 2/9/2019) for htn hld ptas. Introducing [facility]! Dear Narayan Sorenson: 
Thank you for requesting a [employer] account. Our records indicate that you already have an active [employer] account. You can access your account anytime at [email or web address] Did you know that you can access your hospital and ER discharge instructions at any time in [employer]? You can also review all of your test results from your hospital stay or ER visit. Additional Information If you have questions, please visit the Frequently Asked Questions section of the [employer]vz website at [email or web address] Remember, [employer] is NOT to be used for urgent needs. For medical emergencies, dial 911. Now available from your iPhone and Android! Please provide this summary of care documentation to your next provider. Your primary care clinician is listed as Marcelle Cowden LEE. If you have any questions after today's visit, please call [phone].

## 2018-08-10 LAB
BUN SERPL-MCNC: 18 MG/DL (ref 6–24)
BUN/CREAT SERPL: 14 (ref 9–20)
CALCIUM SERPL-MCNC: 9.1 MG/DL (ref 8.7–10.2)
CHLORIDE SERPL-SCNC: 106 MMOL/L (ref 96–106)
CHOLEST SERPL-MCNC: 230 MG/DL (ref 100–199)
CO2 SERPL-SCNC: 23 MMOL/L (ref 20–29)
CREAT SERPL-MCNC: 1.32 MG/DL (ref 0.76–1.27)
EST. AVERAGE GLUCOSE BLD GHB EST-MCNC: 123 MG/DL
GLUCOSE SERPL-MCNC: 91 MG/DL (ref 65–99)
HBA1C MFR BLD: 5.9 % (ref 4.8–5.6)
HDLC SERPL-MCNC: 68 MG/DL
LDLC SERPL CALC-MCNC: 138 MG/DL (ref 0–99)
POTASSIUM SERPL-SCNC: 4.4 MMOL/L (ref 3.5–5.2)
PSA SERPL-MCNC: 2.2 NG/ML (ref 0–4)
SODIUM SERPL-SCNC: 143 MMOL/L (ref 134–144)
TRIGL SERPL-MCNC: 119 MG/DL (ref 0–149)
VLDLC SERPL CALC-MCNC: 24 MG/DL (ref 5–40)

## 2018-08-17 ENCOUNTER — TELEPHONE (OUTPATIENT)
Dept: INTERNAL MEDICINE CLINIC | Age: 53
End: 2018-08-17

## 2018-08-17 RX ORDER — AMOXICILLIN 500 MG/1
500 CAPSULE ORAL 3 TIMES DAILY
Qty: 21 CAP | Refills: 0 | Status: SHIPPED | OUTPATIENT
Start: 2018-08-17 | End: 2019-02-08 | Stop reason: ALTCHOICE

## 2018-08-17 NOTE — TELEPHONE ENCOUNTER
Patient has a possible sinus infection. The pharmacy is [facility] at [phone]. His number is [phone].        Message received & copied from [facility]

## 2018-08-17 NOTE — TELEPHONE ENCOUNTER
Spoke with patient after 2 patient identifiers being note and advised per Dr. Kory Hernandez that ABT had been sent into pharmacy on file. Patient expressed understanding and has no further questions at this time.

## 2018-08-17 NOTE — TELEPHONE ENCOUNTER
Spoke with patient after 2 patient identifiers being note and advised that patient is experiencing sinus pressure, and congestion, and some green phlegm. Wants to know if something can be called in. Patient expressed understanding and has no further questions at this time.

## 2019-01-03 ENCOUNTER — TELEPHONE (OUTPATIENT)
Dept: INTERNAL MEDICINE CLINIC | Age: 54
End: 2019-01-03

## 2019-01-03 NOTE — TELEPHONE ENCOUNTER
Pt requesting a call back in regards to whether a work order is on file for labs to be done. Pt best contact number is [phone].        Message received & copied from [facility]

## 2019-02-08 ENCOUNTER — OFFICE VISIT (OUTPATIENT)
Dept: INTERNAL MEDICINE CLINIC | Age: 54
End: 2019-02-08

## 2019-02-08 VITALS
HEART RATE: 73 BPM | BODY MASS INDEX: 22.8 KG/M2 | HEIGHT: 73 IN | TEMPERATURE: 97.9 F | WEIGHT: 172 LBS | OXYGEN SATURATION: 98 % | DIASTOLIC BLOOD PRESSURE: 83 MMHG | SYSTOLIC BLOOD PRESSURE: 126 MMHG

## 2019-02-08 DIAGNOSIS — E78.00 PURE HYPERCHOLESTEROLEMIA: ICD-10-CM

## 2019-02-08 DIAGNOSIS — F41.9 ANXIETY: ICD-10-CM

## 2019-02-08 DIAGNOSIS — R73.03 PREDIABETES: ICD-10-CM

## 2019-02-08 DIAGNOSIS — I10 ESSENTIAL HYPERTENSION: Primary | ICD-10-CM

## 2019-02-08 DIAGNOSIS — Z00.00 MEDICARE ANNUAL WELLNESS VISIT, SUBSEQUENT: ICD-10-CM

## 2019-02-08 RX ORDER — OMEPRAZOLE 20 MG/1
CAPSULE, DELAYED RELEASE ORAL
Qty: 90 CAP | Refills: 3 | Status: SHIPPED | OUTPATIENT
Start: 2019-02-08 | End: 2020-01-14

## 2019-02-08 RX ORDER — LORAZEPAM 0.5 MG/1
0.5 TABLET ORAL
Qty: 10 TAB | Refills: 0 | Status: SHIPPED | OUTPATIENT
Start: 2019-02-08 | End: 2022-08-18 | Stop reason: SDUPTHER

## 2019-02-08 RX ORDER — ATORVASTATIN CALCIUM 10 MG/1
10 TABLET, FILM COATED ORAL DAILY
Qty: 30 TAB | Refills: 6 | Status: SHIPPED | OUTPATIENT
Start: 2019-02-08 | End: 2019-02-08 | Stop reason: SDUPTHER

## 2019-02-08 RX ORDER — ATORVASTATIN CALCIUM 10 MG/1
TABLET, FILM COATED ORAL
Qty: 90 TAB | Refills: 6 | Status: SHIPPED | OUTPATIENT
Start: 2019-02-08 | End: 2020-04-13

## 2019-02-08 NOTE — PROGRESS NOTES
Chief Complaint   Patient presents with    Hypertension     rountine check    Blood sugar problem      routine check    Labs     rountine check and PSA    Excessive Sweating     ?  panic attack 3-4 months    Medication Refill     ativan, omeparozole

## 2019-02-08 NOTE — PATIENT INSTRUCTIONS
Medicare Wellness Visit, Male    The best way to live healthy is to have a lifestyle where you eat a well-balanced diet, exercise regularly, limit alcohol use, and quit all forms of tobacco/nicotine, if applicable. Regular preventive services are another way to keep healthy. Preventive services (vaccines, screening tests, monitoring & exams) can help personalize your care plan, which helps you manage your own care. Screening tests can find health problems at the earliest stages, when they are easiest to treat. [facility] follows the current, evidence-based guidelines published by the [facility] Randy Aye ([facility]) when recommending preventive services for our patients. Because we follow these guidelines, sometimes recommendations change over time as research supports it. (For example, a prostate screening blood test is no longer routinely recommended for men with no symptoms.)  Of course, you and your doctor may decide to screen more often for some diseases, based on your risk and co-morbidities (chronic disease you are already diagnosed with). Preventive services for you include:  - Medicare offers their members a free annual wellness visit, which is time for you and your primary care provider to discuss and plan for your preventive service needs. Take advantage of this benefit every year!  -All adults over age 72 should receive the recommended pneumonia vaccines. Current USPSTF guidelines recommend a series of two vaccines for the best pneumonia protection.   -All adults should have a flu vaccine yearly and an ECG.  All adults age 61 and older should receive a shingles vaccine once in their lifetime.    -All adults age 38-68 who are overweight should have a diabetes screening test once every three years.   -Other screening tests & preventive services for persons with diabetes include: an eye exam to screen for diabetic retinopathy, a kidney function test, a foot exam, and stricter control over your cholesterol.   -Cardiovascular screening for adults with routine risk involves an electrocardiogram (ECG) at intervals determined by the provider.   -Colorectal cancer screening should be done for adults age 54-65 with no increased risk factors for colorectal cancer. There are a number of acceptable methods of screening for this type of cancer. Each test has its own benefits and drawbacks. Discuss with your provider what is most appropriate for you during your annual wellness visit. The different tests include: colonoscopy (considered the best screening method), a fecal occult blood test, a fecal DNA test, and sigmoidoscopy.  -All adults born between [facility] should be screened once for Hepatitis C.  -An Abdominal Aortic Aneurysm (AAA) Screening is recommended for men age 73-68 who has ever smoked in their lifetime.      Here is a list of your current Health Maintenance items (your personalized list of preventive services) with a due date:  Health Maintenance Due   Topic Date Due    Shingles Vaccine (1 of 2) 10/27/2016    Annual Well Visit  08/09/2018

## 2019-02-08 NOTE — PROGRESS NOTES
HISTORY OF PRESENT ILLNESS  Heraclio Nova is a 47 y.o. male. HPI       F/u HTN , hx hyperglycemia with FH DM-2, HLD. PTSD and medicare wellness--------------------  Sees Dr Regine reed MD for anxiety  Sees Dr Roseline Davies for chronic right neck to arm pain--stopped neurontin. Might need injections,a ppt next week  C/o increased sweating    Working at [employer] Dr Carolynn Lo renal MD for ckd with proteinuria -solitary right kidney---losartan was stopped and now hydralazine      Last OV     Getting PT and seeing Dr Regine Tran and [facility] for PTSD, depression  cymbalta added and zoloft stopped per derek ROMERO     Had been on a better diet, not eating out anymore     ckd 3/solitary kideny, has not seen renal Dr Estella Irby this year  Last OV:  ED f/u for Chest pain at [facility] x 1 weeks, intermittently  Enzymes and ekg negative  Had CP at rest and worse with heaviness when supine   Had normal cardiac cath 2013  Went back to court for hearing on disability r/t neck and shoulder injurines and PTSD from trauma 2008-haivng some increased anxiety  pepcid helped while in ED.          Patient Active Problem List    Diagnosis Date Noted    CKD (chronic kidney disease) stage 3, GFR 30-59 ml/min ([facility]) 03/05/2018    Ulnar neuropathy at elbow of left upper extremity 09/07/2016    Cervical post-laminectomy syndrome 09/07/2016    Cervical radiculopathy due to degenerative joint disease of spine 09/07/2016    PPD positive 01/12/2015    ADD (attention deficit disorder with hyperactivity) 11/15/2011    Proteinuria 02/07/2011    FH: CAD (coronary artery disease) 12/16/2010    Solitary kidney 12/14/2010    Gout 12/14/2010    Elevated LFT's 12/14/2010    HTN (hypertension) 02/23/2010    Hyperglycemia 02/23/2010    PTSD (post-traumatic stress disorder) 02/23/2010    Chronic neck pain 02/23/2010    Anxiety 02/23/2010    Family history of diabetes mellitus 02/23/2010     Current Outpatient Medications   Medication Sig Dispense Refill    CIALIS 20 mg tablet TAKE 1 TABLET BY MOUTH AS NEEDED 8 Tab 6    DULoxetine (CYMBALTA) 60 mg capsule Take 60 mg by mouth daily.  LORazepam (ATIVAN) 0.5 mg tablet Take 1 Tab by mouth every eight (8) hours as needed for Anxiety. Max Daily Amount: 1.5 mg. 10 Tab 0    tiZANidine (ZANAFLEX) 2 mg tablet TAKE 1 TO 2 TABLETS BY MOUTH THREE TIMES DAILY AS NEEDED FOR SPASMS 540 Tab 3    omeprazole (PRILOSEC) 20 mg capsule TAKE ONE CAPSULE BY MOUTH DAILY 90 Cap 3    hydrALAZINE (APRESOLINE) 25 mg tablet Take 25 mg by mouth two (2) times a day.  gabapentin (NEURONTIN) 400 mg capsule Take 400 mg by mouth three (3) times daily.  multivitamin (ONE A DAY) tablet Take 1 Tab by mouth daily.  Cholecalciferol, Vitamin D3, 5,000 unit Tab Take 10,000 Int'l Units by mouth daily. Indications: VITAMIN D DEFICIENCY      aspirin 81 mg tablet Take 1 Tab by mouth daily. 30 Tab 11    losartan (COZAAR) 25 mg tablet Take  by mouth daily.  lidocaine (LIDODERM) 5 % Apply patch to the affected area for 12 hours a day and remove for 12 hours a day. 5 Each 0    pyridoxine, vitamin B6, (VITAMIN B-6) 100 mg tablet Take 1 Tab by mouth daily.  30 Tab 0     Allergies   Allergen Reactions    Morphine Itching      Lab Results   Component Value Date/Time    WBC 11.5 (H) 03/04/2018 04:53 PM    HGB 13.0 03/04/2018 04:53 PM    HCT 38.6 03/04/2018 04:53 PM    PLATELET 469 10/17/8190 04:53 PM    MCV 87.1 03/04/2018 04:53 PM     Lab Results   Component Value Date/Time    Hemoglobin A1c 5.9 (H) 08/09/2018 04:32 PM    Hemoglobin A1c 5.7 (H) 10/05/2017 02:48 PM    Hemoglobin A1c 5.8 04/16/2010 12:06 PM    Glucose 91 08/09/2018 04:32 PM    Microalb/Creat ratio (ug/mg creat.) 482.4 (H) 10/05/2017 02:48 PM    LDL, calculated 138 (H) 08/09/2018 04:32 PM    Creatinine 1.32 (H) 08/09/2018 04:32 PM      Lab Results   Component Value Date/Time    Cholesterol, total 230 (H) 08/09/2018 04:32 PM    HDL Cholesterol 68 08/09/2018 04:32 PM    LDL, calculated 138 (H) 08/09/2018 04:32 PM    Triglyceride 119 08/09/2018 04:32 PM    CHOL/HDL Ratio 4.0 05/04/2013 07:00 AM     Lab Results   Component Value Date/Time    GFR est non-AA 62 08/09/2018 04:32 PM    GFR est AA 72 08/09/2018 04:32 PM    Creatinine 1.32 (H) 08/09/2018 04:32 PM    BUN 18 08/09/2018 04:32 PM    Sodium 143 08/09/2018 04:32 PM    Potassium 4.4 08/09/2018 04:32 PM    Chloride 106 08/09/2018 04:32 PM    CO2 23 08/09/2018 04:32 PM        ROS    Physical Exam   Constitutional: He appears well-developed and well-nourished. No distress. Appears stated age   HENT:   Head: Normocephalic. Cardiovascular: Normal rate and regular rhythm. Exam reveals friction rub. Exam reveals no gallop. No murmur heard. Pulmonary/Chest: Effort normal and breath sounds normal.   Abdominal: Soft. Musculoskeletal: He exhibits no edema. Neurological: He is alert. Psychiatric: He has a normal mood and affect. Nursing note and vitals reviewed. ASSESSMENT and PLAN  Diagnoses and all orders for this visit:    1. Essential hypertension    2. Anxiety  -     LORazepam (ATIVAN) 0.5 mg tablet; Take 1 Tab by mouth every eight (8) hours as needed for Anxiety. Max Daily Amount: 1.5 mg.    3. Prediabetes  -     HEMOGLOBIN A1C WITH EAG    4. Pure hypercholesterolemia  -     LIPID PANEL    Other orders  -     omeprazole (PRILOSEC) 20 mg capsule; TAKE ONE CAPSULE BY MOUTH DAILY      Follow-up Disposition: Not on File   This is the Subsequent Medicare Annual Wellness Exam, performed 12 months or more after the Initial AWV or the last Subsequent AWV    I have reviewed the patient's medical history in detail and updated the computerized patient record. History     Past Medical History:   Diagnosis Date    Arthritis     gout    Chronic kidney disease     no left kidney (congenital);  Stage II kidney failure    Heart failure (HCC)     Hypertension     MI (mitral incompetence)     5/10/13    Psychiatric disorder     Depression & PTSD    PTSD (post-traumatic stress disorder)     Seizures ([facility] [address]     ? r/t PTSD- last seizure 2011      Past Surgical History:   Procedure Laterality Date    HX ORTHOPAEDIC  2007    cervical anterior discectomy and fusion--Dr. Elmer Mayes     Current Outpatient Medications   Medication Sig Dispense Refill    LORazepam (ATIVAN) 0.5 mg tablet Take 1 Tab by mouth every eight (8) hours as needed for Anxiety. Max Daily Amount: 1.5 mg. 10 Tab 0    omeprazole (PRILOSEC) 20 mg capsule TAKE ONE CAPSULE BY MOUTH DAILY 90 Cap 3    CIALIS 20 mg tablet TAKE 1 TABLET BY MOUTH AS NEEDED 8 Tab 6    DULoxetine (CYMBALTA) 60 mg capsule Take 60 mg by mouth daily.  tiZANidine (ZANAFLEX) 2 mg tablet TAKE 1 TO 2 TABLETS BY MOUTH THREE TIMES DAILY AS NEEDED FOR SPASMS 540 Tab 3    hydrALAZINE (APRESOLINE) 25 mg tablet Take 25 mg by mouth two (2) times a day.  multivitamin (ONE A DAY) tablet Take 1 Tab by mouth daily.  Cholecalciferol, Vitamin D3, 5,000 unit Tab Take 10,000 Int'l Units by mouth daily. Indications: VITAMIN D DEFICIENCY      aspirin 81 mg tablet Take 1 Tab by mouth daily. 30 Tab 11    lidocaine (LIDODERM) 5 % Apply patch to the affected area for 12 hours a day and remove for 12 hours a day. 5 Each 0    pyridoxine, vitamin B6, (VITAMIN B-6) 100 mg tablet Take 1 Tab by mouth daily.  30 Tab 0     Allergies   Allergen Reactions    Morphine Itching     Family History   Problem Relation Age of Onset    Diabetes Mother     Diabetes Father     Heart Disease Father     Cancer Father         prostate cancer    Hypertension Sister      Social History     Tobacco Use    Smoking status: Former Smoker     Packs/day: 0.30     Types: Cigarettes    Smokeless tobacco: Never Used   Substance Use Topics    Alcohol use: No     Patient Active Problem List   Diagnosis Code    HTN (hypertension) I10    Hyperglycemia R73.9    PTSD (post-traumatic stress disorder) F43.10    Chronic neck pain M54.2, G89.29    Anxiety F41.9    Family history of diabetes mellitus Z83.3    Solitary kidney Q60.0    Gout M10.9    Elevated LFT's     FH: CAD (coronary artery disease) Z82.49    Proteinuria R80.9    ADD (attention deficit disorder with hyperactivity)     PPD positive R76.11    Ulnar neuropathy at elbow of left upper extremity G56.22    Cervical post-laminectomy syndrome M96.1    Cervical radiculopathy due to degenerative joint disease of spine M47.22    CKD (chronic kidney disease) stage 3, GFR 30-59 ml/min ([facility]) N18.3       Depression Risk Factor Screening:     PHQ over the last two weeks 8/9/2018   PHQ Not Done -   Little interest or pleasure in doing things Several days   Feeling down, depressed, irritable, or hopeless Nearly every day   Total Score PHQ 2 4   Trouble falling or staying asleep, or sleeping too much Nearly every day   Feeling tired or having little energy Nearly every day   Poor appetite, weight loss, or overeating Nearly every day   Feeling bad about yourself - or that you are a failure or have let yourself or your family down Not at all   Trouble concentrating on things such as school, work, reading, or watching TV Several days   Moving or speaking so slowly that other people could have noticed; or the opposite being so fidgety that others notice Not at all   Thoughts of being better off dead, or hurting yourself in some way Not at all   PHQ 9 Score 14     Alcohol Risk Factor Screening: You do not drink alcohol or very rarely. Functional Ability and Level of Safety:   Hearing Loss  Hearing is good. Activities of Daily Living  The home contains: no safety equipment. Patient does total self care    Fall Risk  No flowsheet data found.     Abuse Screen  Patient is not abused    Cognitive Screening   Evaluation of Cognitive Function:  Has your family/caregiver stated any concerns about your memory: no  Normal    Patient Care Team   Patient Care Team:  Louie Potter Razia Ramírez MD as PCP - General (Internal Medicine)  Leroy Mcgarry RN as Ambulatory Care Navigator  Dyana Phelan MD (Orthopedic Surgery)  Abrahan Ayoub MD (Gastroenterology)  Misty Adhikari MD (Dermatology)  Richard Vasquez MD (Nephrology)  Meryl Whitt MD (Orthopedic Surgery)  Dr. Classie Claude (Psychiatry)  Boogie Elena MD (Orthopedic Surgery)  Cynthia Villatoro MD (Neurology)    Assessment/Plan   Education and counseling provided:  Are appropriate based on today's review and evaluation  End-of-Life planning (with patient's consent)-advised completion of AMD forms  shingrix recommended    Diagnoses and all orders for this visit:    1. Essential hypertension   Good control on hydralazine  2. Anxiety  -     LORazepam (ATIVAN) 0.5 mg tablet; Take 1 Tab by mouth every eight (8) hours as needed for Anxiety. Max Daily Amount: 1.5 mg. 10 tabs   Has f/u psych MD saeid Arriaga    3. Prediabetes  -     HEMOGLOBIN A1C WITH EAG    4. Pure hypercholesterolemia  -     LIPID PANEL   Start lipitor 10 mg every day and check labs in 4 weeks   10 yr CV risk > 8 % calculated today and discussed    5. CKD 2   F/u renal MD    6.  Chronic neck pain   F/u Dr Pastora Gonsalez  Other orders  -     omeprazole (PRILOSEC) 20 mg capsule; TAKE ONE CAPSULE BY MOUTH DAILY        Health Maintenance Due   Topic Date Due    Shingrix Vaccine Age 50> (1 of 2) 10/27/2016    MEDICARE YEARLY EXAM  08/09/2018

## 2019-02-12 DIAGNOSIS — E78.00 PURE HYPERCHOLESTEROLEMIA: Primary | ICD-10-CM

## 2019-02-12 LAB
ALT SERPL-CCNC: 18 IU/L (ref 0–44)
AST SERPL-CCNC: 39 IU/L (ref 0–40)
CHOLEST SERPL-MCNC: 220 MG/DL (ref 100–199)
EST. AVERAGE GLUCOSE BLD GHB EST-MCNC: 120 MG/DL
HBA1C MFR BLD: 5.8 % (ref 4.8–5.6)
HDLC SERPL-MCNC: 52 MG/DL
LDLC SERPL CALC-MCNC: 142 MG/DL (ref 0–99)
TRIGL SERPL-MCNC: 128 MG/DL (ref 0–149)
VLDLC SERPL CALC-MCNC: 26 MG/DL (ref 5–40)

## 2019-03-07 DIAGNOSIS — R07.9 CHEST PAIN, UNSPECIFIED TYPE: ICD-10-CM

## 2019-03-07 RX ORDER — TIZANIDINE 2 MG/1
TABLET ORAL
Qty: 540 TAB | Refills: 0 | Status: SHIPPED | OUTPATIENT
Start: 2019-03-07

## 2019-08-20 ENCOUNTER — TELEPHONE (OUTPATIENT)
Dept: INTERNAL MEDICINE CLINIC | Age: 54
End: 2019-08-20

## 2019-08-20 NOTE — TELEPHONE ENCOUNTER
----- Message from Esequiel Pearce sent at 8/19/2019  5:43 PM EDT -----  Regarding: Dr Artie Quiroz  Appointment not available    Caller's first and last name and relationship to patient (if not the patient):      Best contact number: [phone]      Preferred date and time: any morning  time in late August and Sept      Scheduled appointment date and time: 10/08/2019 at 9:35am       Reason for appointment:6 month f/u and A1C check, would like to be seen sooner, he is still having the sweats along with his panic attacks, and would to go on a cancellation list      Details to clarify the request:      Esequiel Pearce      Copy/paste envera

## 2019-08-20 NOTE — TELEPHONE ENCOUNTER
Called, spoke to pt. Two identifiers confirmed. Appointment scheduled for 9/6 @ 930 with Dr. Hanane Dodson. Pt verbalized understanding of information discussed w/ no further questions at this time.

## 2019-09-05 RX ORDER — TADALAFIL 20 MG/1
TABLET ORAL
Qty: 8 TAB | Refills: 0 | Status: SHIPPED | OUTPATIENT
Start: 2019-09-05 | End: 2020-04-05

## 2019-10-08 ENCOUNTER — OFFICE VISIT (OUTPATIENT)
Dept: INTERNAL MEDICINE CLINIC | Age: 54
End: 2019-10-08

## 2019-10-08 VITALS
OXYGEN SATURATION: 98 % | HEART RATE: 80 BPM | HEIGHT: 73 IN | SYSTOLIC BLOOD PRESSURE: 120 MMHG | BODY MASS INDEX: 23.33 KG/M2 | RESPIRATION RATE: 16 BRPM | WEIGHT: 176 LBS | DIASTOLIC BLOOD PRESSURE: 81 MMHG | TEMPERATURE: 98.4 F

## 2019-10-08 DIAGNOSIS — R73.03 PREDIABETES: Primary | ICD-10-CM

## 2019-10-08 DIAGNOSIS — Z12.5 PROSTATE CANCER SCREENING: ICD-10-CM

## 2019-10-08 DIAGNOSIS — E78.00 PURE HYPERCHOLESTEROLEMIA: ICD-10-CM

## 2019-10-08 DIAGNOSIS — F41.9 ANXIETY: ICD-10-CM

## 2019-10-08 DIAGNOSIS — I10 ESSENTIAL HYPERTENSION: ICD-10-CM

## 2019-10-08 DIAGNOSIS — N18.2 CKD (CHRONIC KIDNEY DISEASE) STAGE 2, GFR 60-89 ML/MIN: ICD-10-CM

## 2019-10-08 RX ORDER — LORAZEPAM 0.5 MG/1
0.5 TABLET ORAL
Qty: 10 TAB | Refills: 0 | Status: CANCELLED | OUTPATIENT
Start: 2019-10-08

## 2019-10-08 RX ORDER — LORAZEPAM 0.5 MG/1
0.5 TABLET ORAL
Qty: 3 TAB | Refills: 0 | Status: SHIPPED | OUTPATIENT
Start: 2019-10-08 | End: 2020-11-25 | Stop reason: ALTCHOICE

## 2019-10-08 NOTE — PROGRESS NOTES
Chief Complaint   Patient presents with    Cholesterol Problem     6 month follow up    Hypertension     6 month follow up    Excessive Sweating     Mostly at night    Labs     Fasting    Anxiety     requesting Ativan qty # 2-3 pill for a E N G test.

## 2019-10-08 NOTE — PATIENT INSTRUCTIONS
Office Policies    Phone calls/patient messages:            Please allow up to 24 hours for someone in the office to contact you about your call or message. Be mindful your provider may be out of the office or your message may require further review. We encourage you to use [employer] for your messages as this is a faster, more efficient way to communicate with our office                         Medication Refills:            Prescription medications require 48-72 business hours to process. We encourage you to use [employer] for your refills. For controlled medications: Please allow 72 business hours to process. Certain medications may require you to  a written prescription at our office. NO narcotic/controlled medications will be prescribed after 4pm Monday through Friday or on weekends              Form/Paperwork Completion:            Please note a $25 fee may incur for all paperwork for completed by our providers. We ask that you allow 7-10 business days. Pre-payment is due prior to picking up/faxing the completed form. You may also download your forms to [employer] to have your doctor print off.

## 2019-10-08 NOTE — PROGRESS NOTES
HISTORY OF PRESENT ILLNESS  Jovon Norwood is a 47 y.o. male. HPI      F/u HTN , hx hyperglycemia with FH DM-2, HLD. PTSD   Was started on liitor 10 mg every day last OV-----------------tolerating and adherent    EMG scheduled next week for b/l hand hand numbness and tingling, right side is worse, hx cervical spine surgery,  Had neck C spine MRI 23 months ago  Requests ativan prior to procedure  Has been limiting carbs and high sugar snacks  Has not seen Dr Shauna Rubi in last year for ckd 2--1 kidney    Astrid reed MD for anxiety  Sees Dr Dixie Johnson for chronic right neck to arm pain--stopped neurontin.  Might need injections,a ppt next week  C/o increased sweating     Working at [employer] Dr Tasia Mendez renal MD for ckd with proteinuria -solitary right kidney---losartan was stopped and now hydralazine          Patient Active Problem List    Diagnosis Date Noted    CKD (chronic kidney disease) stage 3, GFR 30-59 ml/min ([facility]) 03/05/2018    Ulnar neuropathy at elbow of left upper extremity 09/07/2016    Cervical post-laminectomy syndrome 09/07/2016    Cervical radiculopathy due to degenerative joint disease of spine 09/07/2016    PPD positive 01/12/2015    ADD (attention deficit disorder with hyperactivity) 11/15/2011    Proteinuria 02/07/2011    FH: CAD (coronary artery disease) 12/16/2010    Solitary kidney 12/14/2010    Gout 12/14/2010    Elevated LFT's 12/14/2010    HTN (hypertension) 02/23/2010    Hyperglycemia 02/23/2010    PTSD (post-traumatic stress disorder) 02/23/2010    Chronic neck pain 02/23/2010    Anxiety 02/23/2010    Family history of diabetes mellitus 02/23/2010     Current Outpatient Medications   Medication Sig Dispense Refill    tadalafil (CIALIS) 20 mg tablet TAKE 1 TABLET BY MOUTH AS NEEDED 8 Tab 0    tiZANidine (ZANAFLEX) 2 mg tablet TAKE 1 TO 2 TABLETS BY MOUTH THREE TIMES DAILY AS NEEDED FOR SPASMS 540 Tab 0    LORazepam (ATIVAN) 0.5 mg tablet Take 1 Tab by mouth every eight (8) hours as needed for Anxiety. Max Daily Amount: 1.5 mg. 10 Tab 0    omeprazole (PRILOSEC) 20 mg capsule TAKE ONE CAPSULE BY MOUTH DAILY 90 Cap 3    atorvastatin (LIPITOR) 10 mg tablet TAKE 1 TABLET BY MOUTH ONCE DAILY 90 Tab 6    DULoxetine (CYMBALTA) 60 mg capsule Take 60 mg by mouth daily.  hydrALAZINE (APRESOLINE) 25 mg tablet Take 25 mg by mouth two (2) times a day.  lidocaine (LIDODERM) 5 % Apply patch to the affected area for 12 hours a day and remove for 12 hours a day. 5 Each 0    pyridoxine, vitamin B6, (VITAMIN B-6) 100 mg tablet Take 1 Tab by mouth daily. 30 Tab 0    multivitamin (ONE A DAY) tablet Take 1 Tab by mouth daily.  Cholecalciferol, Vitamin D3, 5,000 unit Tab Take 10,000 Int'l Units by mouth daily. Indications: VITAMIN D DEFICIENCY      aspirin 81 mg tablet Take 1 Tab by mouth daily. 30 Tab 11     Allergies   Allergen Reactions    Morphine Itching      Lab Results   Component Value Date/Time    WBC 11.5 (H) 03/04/2018 04:53 PM    HGB 13.0 03/04/2018 04:53 PM    HCT 38.6 03/04/2018 04:53 PM    PLATELET 270 33/23/0840 04:53 PM    MCV 87.1 03/04/2018 04:53 PM     Lab Results   Component Value Date/Time    Hemoglobin A1c 5.8 (H) 02/11/2019 08:21 AM    Hemoglobin A1c 5.9 (H) 08/09/2018 04:32 PM    Hemoglobin A1c 5.7 (H) 10/05/2017 02:48 PM    Glucose 91 08/09/2018 04:32 PM    Microalb/Creat ratio (ug/mg creat.) 482.4 (H) 10/05/2017 02:48 PM    LDL, calculated 142 (H) 02/11/2019 08:21 AM    Creatinine 1.32 (H) 08/09/2018 04:32 PM      Lab Results   Component Value Date/Time    Cholesterol, total 220 (H) 02/11/2019 08:21 AM    HDL Cholesterol 52 02/11/2019 08:21 AM    LDL, calculated 142 (H) 02/11/2019 08:21 AM    Triglyceride 128 02/11/2019 08:21 AM    CHOL/HDL Ratio 4.0 05/04/2013 07:00 AM        ROS    Physical Exam   Constitutional: He appears well-developed and well-nourished. No distress. Appears stated age   HENT:   Head: Normocephalic. Cardiovascular: Normal rate, regular rhythm and normal heart sounds. Exam reveals no gallop and no friction rub. No murmur heard. Pulmonary/Chest: Effort normal and breath sounds normal. He has no wheezes. He has no rales. He exhibits no tenderness. Abdominal: Soft. Musculoskeletal: He exhibits no edema. Neurological: He is alert. Psychiatric: He has a normal mood and affect. Nursing note and vitals reviewed. ASSESSMENT and PLAN  Diagnoses and all orders for this visit:    1. Prediabetes  -     METABOLIC PANEL, COMPREHENSIVE  -     HEMOGLOBIN A1C WITH EAG    2. Essential hypertension  -     METABOLIC PANEL, COMPREHENSIVE   Controlled without medication  3. Pure hypercholesterolemia  -     LIPID PANEL  -     METABOLIC PANEL, COMPREHENSIVE   On lipitor -tolerating  4. CKD (chronic kidney disease) stage 2, GFR 60-89 ml/min  -     METABOLIC PANEL, COMPREHENSIVE   Solitary kidney  5. Prostate cancer screening  -     PSA SCREENING (SCREENING)     6. Anxiety  -     LORazepam (ATIVAN) 0.5 mg tablet; Take 1 Tab by mouth every eight (8) hours as needed for Anxiety. Max Daily Amount: 1.5 mg.   Prior to EMG only -3 tabs/nr    Follow-up and Dispositions    · Return in about 6 months (around 4/8/2020) for htn ckd 2 anxiety prediabetes.

## 2019-10-09 LAB
ALBUMIN SERPL-MCNC: 4.4 G/DL (ref 3.5–5.5)
ALBUMIN/GLOB SERPL: 2.1 {RATIO} (ref 1.2–2.2)
ALP SERPL-CCNC: 56 IU/L (ref 39–117)
ALT SERPL-CCNC: 20 IU/L (ref 0–44)
AST SERPL-CCNC: 45 IU/L (ref 0–40)
BILIRUB SERPL-MCNC: 0.3 MG/DL (ref 0–1.2)
BUN SERPL-MCNC: 18 MG/DL (ref 6–24)
BUN/CREAT SERPL: 12 (ref 9–20)
CALCIUM SERPL-MCNC: 9.3 MG/DL (ref 8.7–10.2)
CHLORIDE SERPL-SCNC: 103 MMOL/L (ref 96–106)
CHOLEST SERPL-MCNC: 162 MG/DL (ref 100–199)
CO2 SERPL-SCNC: 20 MMOL/L (ref 20–29)
CREAT SERPL-MCNC: 1.49 MG/DL (ref 0.76–1.27)
EST. AVERAGE GLUCOSE BLD GHB EST-MCNC: 120 MG/DL
GLOBULIN SER CALC-MCNC: 2.1 G/DL (ref 1.5–4.5)
GLUCOSE SERPL-MCNC: 93 MG/DL (ref 65–99)
HBA1C MFR BLD: 5.8 % (ref 4.8–5.6)
HDLC SERPL-MCNC: 57 MG/DL
LDLC SERPL CALC-MCNC: 83 MG/DL (ref 0–99)
POTASSIUM SERPL-SCNC: 4.4 MMOL/L (ref 3.5–5.2)
PROT SERPL-MCNC: 6.5 G/DL (ref 6–8.5)
PSA SERPL-MCNC: 2.4 NG/ML (ref 0–4)
SODIUM SERPL-SCNC: 141 MMOL/L (ref 134–144)
TRIGL SERPL-MCNC: 109 MG/DL (ref 0–149)
VLDLC SERPL CALC-MCNC: 22 MG/DL (ref 5–40)

## 2019-10-21 ENCOUNTER — TELEPHONE (OUTPATIENT)
Dept: INTERNAL MEDICINE CLINIC | Age: 54
End: 2019-10-21

## 2019-10-21 NOTE — TELEPHONE ENCOUNTER
----- Message from 56Mckayla Ramírez sent at 10/21/2019 10:15 AM EDT -----  Regarding: Dr. Pino Salvador required yes/no and why:  Yes- returned phone call    Best contact number(s): [phone]  Details to clarify the request:  Pt stated he received a call this morning  from [facility] to reschedule his appt for today at 11:30. Pt requesting a call back.

## 2019-10-21 NOTE — TELEPHONE ENCOUNTER
Called, spoke to pt. Two identifiers confirmed. Notified pt of lab results/recommendations per Dr. Kat Braun. Pt verbalized understanding of information discussed w/ no further questions at this time.

## 2020-01-14 RX ORDER — OMEPRAZOLE 20 MG/1
CAPSULE, DELAYED RELEASE ORAL
Qty: 90 CAP | Refills: 3 | Status: SHIPPED | OUTPATIENT
Start: 2020-01-14 | End: 2021-05-12 | Stop reason: SDUPTHER

## 2020-04-05 RX ORDER — TADALAFIL 20 MG/1
TABLET ORAL
Qty: 8 TAB | Refills: 0 | Status: SHIPPED | OUTPATIENT
Start: 2020-04-05 | End: 2020-07-10

## 2020-04-13 RX ORDER — ATORVASTATIN CALCIUM 10 MG/1
TABLET, FILM COATED ORAL
Qty: 90 TAB | Refills: 6 | Status: SHIPPED | OUTPATIENT
Start: 2020-04-13 | End: 2021-05-26 | Stop reason: SDUPTHER

## 2020-07-10 RX ORDER — TADALAFIL 20 MG/1
TABLET ORAL
Qty: 8 TAB | Refills: 0 | Status: SHIPPED | OUTPATIENT
Start: 2020-07-10 | End: 2020-10-10

## 2020-09-29 ENCOUNTER — TELEPHONE (OUTPATIENT)
Dept: INTERNAL MEDICINE CLINIC | Age: 55
End: 2020-09-29

## 2020-09-29 NOTE — TELEPHONE ENCOUNTER
[facility]               Caller's first and last name and relationship to patient (if not the patient): pt   Best contact number: [phone]   Preferred date and time: First avail   Scheduled appointment date and time: n/a   Reason for appointment: [facility]-medicare   Details to clarify the request: Blood was found in pt's colon.

## 2020-10-06 ENCOUNTER — TRANSCRIBE ORDER (OUTPATIENT)
Dept: SCHEDULING | Age: 55
End: 2020-10-06

## 2020-10-06 DIAGNOSIS — M54.12 CERVICAL RADICULITIS: Primary | ICD-10-CM

## 2020-10-10 RX ORDER — TADALAFIL 20 MG/1
TABLET ORAL
Qty: 8 TAB | Refills: 0 | Status: SHIPPED | OUTPATIENT
Start: 2020-10-10 | End: 2021-01-23

## 2020-10-20 ENCOUNTER — HOSPITAL ENCOUNTER (OUTPATIENT)
Dept: MRI IMAGING | Age: 55
Discharge: HOME | End: 2020-10-20
Attending: PHYSICAL MEDICINE & REHABILITATION
Payer: OTHER MISCELLANEOUS

## 2020-10-20 DIAGNOSIS — M54.12 CERVICAL RADICULITIS: ICD-10-CM

## 2020-10-20 PROCEDURE — 72141 MRI NECK SPINE W/O DYE: CPT

## 2020-11-09 ENCOUNTER — TELEPHONE (OUTPATIENT)
Dept: INTERNAL MEDICINE CLINIC | Age: 55
End: 2020-11-09

## 2020-11-09 NOTE — TELEPHONE ENCOUNTER
Patient states he needs a call back Asap to get an Acute In Office appt for Blood in Stool advised from test done, also for A1C & Complete Medicare Wellness check. Please call to discuss.  Thank you

## 2020-11-10 NOTE — TELEPHONE ENCOUNTER
Neetu Poe MD  You 17 hours ago (4:01 PM)      I am full this week and next--advised to go to [facility] for hematochezia. Message text      Pt notified. VV scheduled for 11/25 @ 21 [phone] with Dr. Reggy Frankel. Pt verbalized understanding of information discussed w/ no further questions at this time.

## 2020-11-25 ENCOUNTER — VIRTUAL VISIT (OUTPATIENT)
Dept: INTERNAL MEDICINE CLINIC | Age: 55
End: 2020-11-25
Payer: MEDICARE

## 2020-11-25 DIAGNOSIS — E78.00 PURE HYPERCHOLESTEROLEMIA: ICD-10-CM

## 2020-11-25 DIAGNOSIS — R19.5 HEME POSITIVE STOOL: Primary | ICD-10-CM

## 2020-11-25 DIAGNOSIS — F41.9 ANXIETY: ICD-10-CM

## 2020-11-25 DIAGNOSIS — Z12.5 PROSTATE CANCER SCREENING: ICD-10-CM

## 2020-11-25 DIAGNOSIS — I10 ESSENTIAL HYPERTENSION: ICD-10-CM

## 2020-11-25 DIAGNOSIS — Z00.00 MEDICARE ANNUAL WELLNESS VISIT, SUBSEQUENT: ICD-10-CM

## 2020-11-25 PROCEDURE — 99214 OFFICE O/P EST MOD 30 MIN: CPT | Performed by: INTERNAL MEDICINE

## 2020-11-25 PROCEDURE — G0439 PPPS, SUBSEQ VISIT: HCPCS | Performed by: INTERNAL MEDICINE

## 2020-11-25 PROCEDURE — G8427 DOCREV CUR MEDS BY ELIG CLIN: HCPCS | Performed by: INTERNAL MEDICINE

## 2020-11-25 PROCEDURE — G8756 NO BP MEASURE DOC: HCPCS | Performed by: INTERNAL MEDICINE

## 2020-11-25 PROCEDURE — 3017F COLORECTAL CA SCREEN DOC REV: CPT | Performed by: INTERNAL MEDICINE

## 2020-11-25 PROCEDURE — G8432 DEP SCR NOT DOC, RNG: HCPCS | Performed by: INTERNAL MEDICINE

## 2020-11-25 PROCEDURE — G8421 BMI NOT CALCULATED: HCPCS | Performed by: INTERNAL MEDICINE

## 2020-11-25 RX ORDER — AMITRIPTYLINE HYDROCHLORIDE 100 MG/1
100 TABLET, FILM COATED ORAL AT BEDTIME
COMMUNITY
Start: 2020-09-23

## 2020-11-25 NOTE — PROGRESS NOTES
HISTORY OF PRESENT ILLNESS  Guzman Joseph is a 48 y.o. male. HPI     Guzman Joseph is a 48 y.o. male being evaluated by a Virtual Visit (video visit) encounter to address concerns as mentioned above. A caregiver was present when appropriate. Due to this being a TeleHealth encounter (During [facility] public health emergency), evaluation of the following organ systems was limited: Vitals/Constitutional/EENT/Resp/CV/GI//MS/Neuro/Skin/Heme-Lymph-Imm. Pursuant to the emergency declaration under the [facility], [address] [place] [address] authority and the [place] [employer] and [employer] Act, this Virtual Visit was conducted with patient's (and/or legal guardian's) consent, to reduce the risk of exposure to COVID-19 and provide necessary medical care. Services were provided through a video synchronous discussion virtually to substitute for in-person encounter. --Enedina Loaiza MD on 11/24/2020 at 11:06 PM    An electronic signature was used to authenticate this note. F/u HTN , hx hyperglycemia with FH DM-2, HLD.  PTSD and medicare wellness----  C/o blood in stools recently on stool card  Had colonoscopy in 2010 -extensive tics and internal hemorrhoids-Dr Jarvis Campbell  Last a1c 5.8    Seejustino aparicio MD for PTSD and anxiety--started on elavil recently    Last OV     Was started on liitor 10 mg every day last OV-----------------tolerating and adherent     EMG scheduled next week for b/l hand hand numbness and tingling, right side is worse, hx cervical spine surgery,  Had neck C spine MRI 23 months ago  Requests ativan prior to procedure  Has been limiting carbs and high sugar snacks  Has not seen Dr Stella Cortes in last year for ckd 2--1 kidney    Patient Active Problem List    Diagnosis Date Noted    CKD (chronic kidney disease) stage 3, GFR 30-59 ml/min 03/05/2018    Ulnar neuropathy at elbow of left upper extremity 09/07/2016    Cervical post-laminectomy syndrome 09/07/2016    Cervical radiculopathy due to degenerative joint disease of spine 09/07/2016    PPD positive 01/12/2015    ADD (attention deficit disorder with hyperactivity) 11/15/2011    Proteinuria 02/07/2011    FH: CAD (coronary artery disease) 12/16/2010    Solitary kidney 12/14/2010    Gout 12/14/2010    Elevated LFT's 12/14/2010    HTN (hypertension) 02/23/2010    Hyperglycemia 02/23/2010    PTSD (post-traumatic stress disorder) 02/23/2010    Chronic neck pain 02/23/2010    Anxiety 02/23/2010    Family history of diabetes mellitus 02/23/2010     Current Outpatient Medications   Medication Sig Dispense Refill    amitriptyline (ELAVIL) 100 mg tablet Take 100 mg by mouth At bedtime.  tadalafiL (CIALIS) 20 mg tablet TAKE 1 TABLET BY MOUTH AS NEEDED 8 Tab 0    atorvastatin (LIPITOR) 10 mg tablet TAKE 1 TABLET BY MOUTH ONCE DAILY 90 Tab 6    omeprazole (PRILOSEC) 20 mg capsule TAKE ONE CAPSULE BY MOUTH ONCE DAILY 90 Cap 3    tiZANidine (ZANAFLEX) 2 mg tablet TAKE 1 TO 2 TABLETS BY MOUTH THREE TIMES DAILY AS NEEDED FOR SPASMS 540 Tab 0    LORazepam (ATIVAN) 0.5 mg tablet Take 1 Tab by mouth every eight (8) hours as needed for Anxiety. Max Daily Amount: 1.5 mg. 10 Tab 0    hydrALAZINE (APRESOLINE) 25 mg tablet Take 25 mg by mouth two (2) times a day.  pyridoxine, vitamin B6, (VITAMIN B-6) 100 mg tablet Take 1 Tab by mouth daily. 30 Tab 0    multivitamin (ONE A DAY) tablet Take 1 Tab by mouth daily.  Cholecalciferol, Vitamin D3, 5,000 unit Tab Take 10,000 Int'l Units by mouth daily. Indications: VITAMIN D DEFICIENCY      aspirin 81 mg tablet Take 1 Tab by mouth daily.  30 Tab 11     Allergies   Allergen Reactions    Morphine Itching     Social History     Tobacco Use    Smoking status: Former Smoker     Packs/day: 0.30     Types: Cigarettes    Smokeless tobacco: Never Used   Substance Use Topics    Alcohol use: No      Lab Results   Component Value Date/Time    WBC 11.5 (H) 03/04/2018 04:53 PM    HGB 13.0 03/04/2018 04:53 PM    HCT 38.6 03/04/2018 04:53 PM    PLATELET 893 84/20/3029 04:53 PM    MCV 87.1 03/04/2018 04:53 PM     Lab Results   Component Value Date/Time    Hemoglobin A1c 5.8 (H) 10/08/2019 11:18 AM    Hemoglobin A1c 5.8 (H) 02/11/2019 08:21 AM    Hemoglobin A1c 5.9 (H) 08/09/2018 04:32 PM    Glucose 93 10/08/2019 11:18 AM    Microalb/Creat ratio (ug/mg creat.) 482.4 (H) 10/05/2017 02:48 PM    LDL, calculated 83 10/08/2019 11:18 AM    Creatinine 1.49 (H) 10/08/2019 11:18 AM      Lab Results   Component Value Date/Time    Cholesterol, total 162 10/08/2019 11:18 AM    HDL Cholesterol 57 10/08/2019 11:18 AM    LDL, calculated 83 10/08/2019 11:18 AM    Triglyceride 109 10/08/2019 11:18 AM    CHOL/HDL Ratio 4.0 05/04/2013 07:00 AM     Lab Results   Component Value Date/Time    GFR est non-AA 53 (L) 10/08/2019 11:18 AM    GFR est AA 61 10/08/2019 11:18 AM    Creatinine 1.49 (H) 10/08/2019 11:18 AM    BUN 18 10/08/2019 11:18 AM    Sodium 141 10/08/2019 11:18 AM    Potassium 4.4 10/08/2019 11:18 AM    Chloride 103 10/08/2019 11:18 AM    CO2 20 10/08/2019 11:18 AM        ROS    Physical Exam    ASSESSMENT and PLAN  Diagnoses and all orders for this visit:           This is the Subsequent Medicare Annual Wellness Exam, performed 12 months or more after the Initial AWV or the last Subsequent AWV    I have reviewed the patient's medical history in detail and updated the computerized patient record.      Depression Risk Factor Screening:     3 most recent PHQ Screens 8/9/2018   PHQ Not Done -   Little interest or pleasure in doing things Several days   Feeling down, depressed, irritable, or hopeless Nearly every day   Total Score PHQ 2 4   Trouble falling or staying asleep, or sleeping too much Nearly every day   Feeling tired or having little energy Nearly every day   Poor appetite, weight loss, or overeating Nearly every day   Feeling bad about yourself - or that you are a failure or have let yourself or your family down Not at all   Trouble concentrating on things such as school, work, reading, or watching TV Several days   Moving or speaking so slowly that other people could have noticed; or the opposite being so fidgety that others notice Not at all   Thoughts of being better off dead, or hurting yourself in some way Not at all   PHQ 9 Score 14       Alcohol Risk Screen   Do you average more than 2 drinks per night or 14 drinks a week: No    On any one occasion in the past three months have you have had more than 4 drinks containing alcohol:  No        Functional Ability and Level of Safety:   Hearing: Hearing is good. Activities of Daily Living: The home contains: no safety equipment. Patient does total self care     Ambulation: with no difficulty     Fall Risk:  No flowsheet data found. Abuse Screen:  Patient is not abused       Cognitive Screening   Has your family/caregiver stated any concerns about your memory: no    Cognitive Screening: Normal - serial 3    Assessment/Plan   Education and counseling provided:  Are appropriate based on today's review and evaluation  End-of-Life planning (with patient's consent)  Influenza Vaccine  Prostate cancer screening tests (PSA, covered annually)  shingrix recommended    Diagnoses and all orders for this visit:    1. Heme positive stool    2. Medicare annual wellness visit, subsequent      1. Heme positive stool  -     REFERRAL TO GASTROENTEROLOGY    2. Medicare annual wellness visit, subsequent    3. Essential hypertension  -     CBC W/O DIFF  -     METABOLIC PANEL, COMPREHENSIVE   Controlled per home readings  4. Pure hypercholesterolemia  -     METABOLIC PANEL, COMPREHENSIVE  -     LIPID PANEL  -     TSH 3RD GENERATION   On statin   5. Gregg Browne  6.  Prostate cancer screening  -     PSA SCREENING (SCREENING)      Health Maintenance Due     Health Maintenance Due   Topic Date Due    Shingrix Vaccine Age 50> (1 of 2) 10/27/2016    Medicare Yearly Exam  02/09/2020    Flu Vaccine (1) 09/01/2020    Colorectal Cancer Screening Combo  09/30/2020    Lipid Screen  10/08/2020       Patient Care Team   Patient Care Team:  Trent Mauro MD as PCP - General (Internal Medicine)  Trent Mauro MD as PCP - [facility]  Washington Coe RN as Ambulatory Care Manager  Mary Ann Ballesteros MD (Orthopedic Surgery)  Kolby Patterson MD (Gastroenterology)  Danyelle Brewer MD (Dermatology)  Chinmay Garcia MD (Nephrology)  Latrell Colón MD (Orthopedic Surgery)  Dr. Simin Anders (Psychiatry)  Dorian Shelton MD (Orthopedic Surgery)  Sherry Gongora MD (Neurology)    History     Patient Active Problem List   Diagnosis Code    HTN (hypertension) I10    Hyperglycemia R73.9    PTSD (post-traumatic stress disorder) F43.10    Chronic neck pain M54.2, G89.29    Anxiety F41.9    Family history of diabetes mellitus Z83.3    Solitary kidney Q60.0    Gout M10.9    Elevated LFT's     FH: CAD (coronary artery disease) Z82.49    Proteinuria R80.9    ADD (attention deficit disorder with hyperactivity)     PPD positive R76.11    Ulnar neuropathy at elbow of left upper extremity G56.22    Cervical post-laminectomy syndrome M96.1    Cervical radiculopathy due to degenerative joint disease of spine M47.22    CKD (chronic kidney disease) stage 3, GFR 30-59 ml/min N18.30     Past Medical History:   Diagnosis Date    Arthritis     gout    Chronic kidney disease     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 [address]     ? r/t PTSD- last seizure 2011      Past Surgical History:   Procedure Laterality Date    HX ORTHOPAEDIC  2007    cervical anterior discectomy and fusion--Dr. Darius Ash     Current Outpatient Medications   Medication Sig Dispense Refill    amitriptyline (ELAVIL) 100 mg tablet Take 100 mg by mouth At bedtime.  tadalafiL (CIALIS) 20 mg tablet TAKE 1 TABLET BY MOUTH AS NEEDED 8 Tab 0    atorvastatin (LIPITOR) 10 mg tablet TAKE 1 TABLET BY MOUTH ONCE DAILY 90 Tab 6    omeprazole (PRILOSEC) 20 mg capsule TAKE ONE CAPSULE BY MOUTH ONCE DAILY 90 Cap 3    tiZANidine (ZANAFLEX) 2 mg tablet TAKE 1 TO 2 TABLETS BY MOUTH THREE TIMES DAILY AS NEEDED FOR SPASMS 540 Tab 0    LORazepam (ATIVAN) 0.5 mg tablet Take 1 Tab by mouth every eight (8) hours as needed for Anxiety. Max Daily Amount: 1.5 mg. 10 Tab 0    hydrALAZINE (APRESOLINE) 25 mg tablet Take 25 mg by mouth two (2) times a day.  pyridoxine, vitamin B6, (VITAMIN B-6) 100 mg tablet Take 1 Tab by mouth daily. 30 Tab 0    multivitamin (ONE A DAY) tablet Take 1 Tab by mouth daily.  Cholecalciferol, Vitamin D3, 5,000 unit Tab Take 10,000 Int'l Units by mouth daily. Indications: VITAMIN D DEFICIENCY      aspirin 81 mg tablet Take 1 Tab by mouth daily. 30 Tab 11     Allergies   Allergen Reactions    Morphine Itching       Family History   Problem Relation Age of Onset    Diabetes Mother     Diabetes Father     Heart Disease Father     Cancer Father         prostate cancer    Hypertension Sister      Social History     Tobacco Use    Smoking status: Former Smoker     Packs/day: 0.30     Types: Cigarettes    Smokeless tobacco: Never Used   Substance Use Topics    Alcohol use: No       Anarolan Aguilera, who was evaluated through a synchronous (real-time) audio-video encounter, and/or his healthcare decision maker, is aware that it is a billable service, with coverage as determined by his insurance carrier. He provided verbal consent to proceed: Yes, and patient identification was verified.  It was conducted pursuant to the emergency declaration under the [facility] waiver authority and the Coronavirus Preparedness and Response Supplemental Appropriations Act. A caregiver was present when appropriate. Ability to conduct physical exam was limited. I was at home. The patient was at home.     Navin Longoria MD

## 2020-11-25 NOTE — PATIENT INSTRUCTIONS
Medicare Wellness Visit, Male The best way to live healthy is to have a lifestyle where you eat a well-balanced diet, exercise regularly, limit alcohol use, and quit all forms of tobacco/nicotine, if applicable. Regular preventive services are another way to keep healthy. Preventive services (vaccines, screening tests, monitoring & exams) can help personalize your care plan, which helps you manage your own care. Screening tests can find health problems at the earliest stages, when they are easiest to treat. [facility] follows the current, evidence-based guidelines published by the [facility] Aye ([facility]) when recommending preventive services for our patients. Because we follow these guidelines, sometimes recommendations change over time as research supports it. (For example, a prostate screening blood test is no longer routinely recommended for men with no symptoms). Of course, you and your doctor may decide to screen more often for some diseases, based on your risk and co-morbidities (chronic disease you are already diagnosed with). Preventive services for you include: - Medicare offers their members a free annual wellness visit, which is time for you and your primary care provider to discuss and plan for your preventive service needs. Take advantage of this benefit every year! 
-All adults over age 72 should receive the recommended pneumonia vaccines. Current USPSTF guidelines recommend a series of two vaccines for the best pneumonia protection.  
-All adults should have a flu vaccine yearly and tetanus vaccine every 10 years. 
-All adults age 48 and older should receive the shingles vaccines (series of two vaccines).       
-All adults age 38-68 who are overweight should have a diabetes screening test once every three years.  
-Other screening tests & preventive services for persons with diabetes include: an eye exam to screen for diabetic retinopathy, a kidney function test, a foot exam, and stricter control over your cholesterol.  
-Cardiovascular screening for adults with routine risk involves an electrocardiogram (ECG) at intervals determined by the provider.  
-Colorectal cancer screening should be done for adults age 54-65 with no increased risk factors for colorectal cancer. There are a number of acceptable methods of screening for this type of cancer. Each test has its own benefits and drawbacks. Discuss with your provider what is most appropriate for you during your annual wellness visit. The different tests include: colonoscopy (considered the best screening method), a fecal occult blood test, a fecal DNA test, and sigmoidoscopy. 
-All adults born between [facility] should be screened once for Hepatitis C. 
-An Abdominal Aortic Aneurysm (AAA) Screening is recommended for men age 73-68 who has ever smoked in their lifetime. Here is a list of your current Health Maintenance items (your personalized list of preventive services) with a due date: 
Health Maintenance Due Topic Date Due  Shingles Vaccine (1 of 2) 10/27/2016 [address] [place] [address] Annual Well Visit  02/09/2020  Yearly Flu Vaccine (1) 09/01/2020  Colorectal Screening  09/30/2020  Cholesterol Test   10/08/2020

## 2021-05-14 RX ORDER — OMEPRAZOLE 20 MG/1
CAPSULE, DELAYED RELEASE ORAL
Qty: 90 CAP | Refills: 3 | Status: SHIPPED | OUTPATIENT
Start: 2021-05-14 | End: 2022-06-21 | Stop reason: SDUPTHER

## 2021-05-14 NOTE — TELEPHONE ENCOUNTER
Future Appointments:  Future Appointments   Date Time Provider Ashley Tyesha   5/25/2021  9:00 AM Jamil Loaiza MD [facility] BS AMB        Last Appointment With Me:  11/25/2020     Requested Prescriptions     Pending Prescriptions Disp Refills    omeprazole (PRILOSEC) 20 mg capsule 90 Cap 3     Sig: TAKE ONE CAPSULE BY MOUTH ONCE DAILY

## 2021-05-24 NOTE — PROGRESS NOTES
HISTORY OF PRESENT ILLNESS  Yanelis Pepe is a 48 y.o. male. HPI   Yanelis Pepe, was evaluated through a synchronous (real-time) audio-video encounter. The patient (or guardian if applicable) is aware that this is a billable service. Verbal consent to proceed has been obtained within the past 12 months. The visit was conducted pursuant to the emergency declaration under the [address] [place] [address] and the [employer] Act. Patient identification was verified, and a caregiver was present when appropriate. The patient was located in a state where the provider was credentialed to provide care. --Sariah Jamil MD on 5/24/2021 at 6:35 PM    An electronic signature was used to authenticate this note. F/u HTN , hx hyperglycemia with FH DM-2, HLD. PTSD heme positive stool  Was referred to GI MD last OV for heme positive stool Juliet Elder  Last a1c 5.8  2019]    C/o left lower groin swelling over lat year but worse this year --has been having to lift his mother this year and worse now. No pain. worse with straining , mild pain with lifting    Anxiety and PTSD per psych MD--Dr Edilson Duenas  Had mild covid lat November.   Had covid shot this year completed last month  Last OV       C/o blood in stools recently on stool card  Had colonoscopy in 2010 -extensive tics and internal hemorrhoids-Dr Kalie Elder  Last a1c 5.8     Sees alessandra ROMERO for PTSD and anxiety--started on elavil recently    Patient Active Problem List    Diagnosis Date Noted    CKD (chronic kidney disease) stage 3, GFR 30-59 ml/min ([facility] [address] 03/05/2018    Ulnar neuropathy at elbow of left upper extremity 09/07/2016    Cervical post-laminectomy syndrome 09/07/2016    Cervical radiculopathy due to degenerative joint disease of spine 09/07/2016    PPD positive 01/12/2015    ADD (attention deficit disorder with hyperactivity) 11/15/2011    Proteinuria 02/07/2011    FH: CAD (coronary artery disease) 12/16/2010    Solitary kidney 12/14/2010    Gout 12/14/2010    Elevated LFT's 12/14/2010    HTN (hypertension) 02/23/2010    Hyperglycemia 02/23/2010    PTSD (post-traumatic stress disorder) 02/23/2010    Chronic neck pain 02/23/2010    Anxiety 02/23/2010    Family history of diabetes mellitus 02/23/2010     Current Outpatient Medications   Medication Sig Dispense Refill    omeprazole (PRILOSEC) 20 mg capsule TAKE ONE CAPSULE BY MOUTH ONCE DAILY 90 Cap 3    tadalafiL (CIALIS) 20 mg tablet TAKE 1 TABLET BY MOUTH AS NEEDED 8 Tab 5    amitriptyline (ELAVIL) 100 mg tablet Take 100 mg by mouth At bedtime.  atorvastatin (LIPITOR) 10 mg tablet TAKE 1 TABLET BY MOUTH ONCE DAILY 90 Tab 6    tiZANidine (ZANAFLEX) 2 mg tablet TAKE 1 TO 2 TABLETS BY MOUTH THREE TIMES DAILY AS NEEDED FOR SPASMS 540 Tab 0    LORazepam (ATIVAN) 0.5 mg tablet Take 1 Tab by mouth every eight (8) hours as needed for Anxiety. Max Daily Amount: 1.5 mg. 10 Tab 0    hydrALAZINE (APRESOLINE) 25 mg tablet Take 25 mg by mouth two (2) times a day.  pyridoxine, vitamin B6, (VITAMIN B-6) 100 mg tablet Take 1 Tab by mouth daily. 30 Tab 0    multivitamin (ONE A DAY) tablet Take 1 Tab by mouth daily.  Cholecalciferol, Vitamin D3, 5,000 unit Tab Take 10,000 Int'l Units by mouth daily. Indications: VITAMIN D DEFICIENCY      aspirin 81 mg tablet Take 1 Tab by mouth daily.  30 Tab 11     Allergies   Allergen Reactions    Morphine Itching      Lab Results   Component Value Date/Time    WBC 11.5 (H) 03/04/2018 04:53 PM    HGB 13.0 03/04/2018 04:53 PM    HCT 38.6 03/04/2018 04:53 PM    PLATELET 477 49/59/1801 04:53 PM    MCV 87.1 03/04/2018 04:53 PM     Lab Results   Component Value Date/Time    Hemoglobin A1c 5.8 (H) 10/08/2019 11:18 AM    Hemoglobin A1c 5.8 (H) 02/11/2019 08:21 AM    Hemoglobin A1c 5.9 (H) 08/09/2018 04:32 PM    Glucose 93 10/08/2019 11:18 AM    Microalb/Creat ratio (ug/mg creat.) 482.4 (H) 10/05/2017 02:48 PM    LDL, calculated 83 10/08/2019 11:18 AM    Creatinine 1.49 (H) 10/08/2019 11:18 AM      Lab Results   Component Value Date/Time    Cholesterol, total 162 10/08/2019 11:18 AM    HDL Cholesterol 57 10/08/2019 11:18 AM    LDL, calculated 83 10/08/2019 11:18 AM    Triglyceride 109 10/08/2019 11:18 AM    CHOL/HDL Ratio 4.0 05/04/2013 07:00 AM     Lab Results   Component Value Date/Time    GFR est non-AA 53 (L) 10/08/2019 11:18 AM    GFR est AA 61 10/08/2019 11:18 AM    Creatinine 1.49 (H) 10/08/2019 11:18 AM    BUN 18 10/08/2019 11:18 AM    Sodium 141 10/08/2019 11:18 AM    Potassium 4.4 10/08/2019 11:18 AM    Chloride 103 10/08/2019 11:18 AM    CO2 20 10/08/2019 11:18 AM        ROS    Physical Exam  Vitals and nursing note reviewed. Constitutional:       General: He is not in acute distress. Appearance: He is well-developed. Comments: Appears stated age   HENT:      Head: Normocephalic. Pulmonary:      Effort: Pulmonary effort is normal.   Genitourinary:      Neurological:      Mental Status: He is alert. ASSESSMENT and PLAN  Diagnoses and all orders for this visit:    1. Essential hypertension  -     CBC W/O DIFF; Future  -     METABOLIC PANEL, COMPREHENSIVE; Future   bp monitoring  2. Pure hypercholesterolemia  -     METABOLIC PANEL, COMPREHENSIVE; Future  -     LIPID PANEL; Future   On statin  3. Prostate cancer screening  -     PSA SCREENING (SCREENING); Future    4. Prediabetes  -     HEMOGLOBIN A1C WITH EAG; Future   contnue weight control  5. Heme positive stool  -     REFERRAL TO GASTROENTEROLOGY    6. Solitary kidney  -     REFERRAL TO NEPHROLOGY    7.  Left groin hernia  -     REFERRAL TO GENERAL SURGERY    8. Stage 3 chronic kidney disease, unspecified whether stage 3a or 3b CKD (HCC)  -     REFERRAL TO NEPHROLOGY   CMP    RTC 6 months medicare wellness

## 2021-05-25 ENCOUNTER — VIRTUAL VISIT (OUTPATIENT)
Dept: INTERNAL MEDICINE CLINIC | Age: 56
End: 2021-05-25
Payer: MEDICARE

## 2021-05-25 DIAGNOSIS — N18.30 STAGE 3 CHRONIC KIDNEY DISEASE, UNSPECIFIED WHETHER STAGE 3A OR 3B CKD (HCC): ICD-10-CM

## 2021-05-25 DIAGNOSIS — K40.90 LEFT GROIN HERNIA: ICD-10-CM

## 2021-05-25 DIAGNOSIS — R73.03 PREDIABETES: ICD-10-CM

## 2021-05-25 DIAGNOSIS — R19.5 HEME POSITIVE STOOL: ICD-10-CM

## 2021-05-25 DIAGNOSIS — Z12.5 PROSTATE CANCER SCREENING: ICD-10-CM

## 2021-05-25 DIAGNOSIS — I10 ESSENTIAL HYPERTENSION: Primary | ICD-10-CM

## 2021-05-25 DIAGNOSIS — E78.00 PURE HYPERCHOLESTEROLEMIA: ICD-10-CM

## 2021-05-25 PROCEDURE — G8756 NO BP MEASURE DOC: HCPCS | Performed by: INTERNAL MEDICINE

## 2021-05-25 PROCEDURE — 3017F COLORECTAL CA SCREEN DOC REV: CPT | Performed by: INTERNAL MEDICINE

## 2021-05-25 PROCEDURE — G8432 DEP SCR NOT DOC, RNG: HCPCS | Performed by: INTERNAL MEDICINE

## 2021-05-25 PROCEDURE — 99213 OFFICE O/P EST LOW 20 MIN: CPT | Performed by: INTERNAL MEDICINE

## 2021-05-25 PROCEDURE — G8427 DOCREV CUR MEDS BY ELIG CLIN: HCPCS | Performed by: INTERNAL MEDICINE

## 2021-05-25 NOTE — PROGRESS NOTES
This is an established visit conducted via telemedicine. The patient has been instructed that this meets HIPAA criteria and acknowledges and agrees to this method of visitation. Identified pt with two pt identifiers(name and ). Chief Complaint   Patient presents with    Follow-up     6 months follow up HTN,HLD, Anxiety,        Health Maintenance Due   Topic Date Due    COVID-19 Vaccine (1) Never done    Shingles Vaccine (1 of 2) Never done    Colorectal Screening  2020    Cholesterol Test   10/08/2020       Mr. Alexi Ospina has a reminder for a \"due or due soon\" health maintenance. I have asked that he discuss this further with his primary care provider for follow-up on this health maintenance.

## 2021-05-26 RX ORDER — ATORVASTATIN CALCIUM 10 MG/1
10 TABLET, FILM COATED ORAL DAILY
Qty: 90 TABLET | Refills: 4 | Status: SHIPPED | OUTPATIENT
Start: 2021-05-26 | End: 2022-08-18 | Stop reason: SDUPTHER

## 2021-05-26 NOTE — TELEPHONE ENCOUNTER
Tal Julian MD, patient past due for refill; prev rx . Rx pended for your signature/modification as appropriate    LOV: 21  Next: to be scheduled    Thank you,  Juliana Cortes, PharmD, [address]  Direct: [phone]  Department, toll free: [phone], option 7     =========================================================  CLINICAL PHARMACY: ADHERENCE REVIEW  Identified care gap per [facility]; fills at [facility]: Statin adherence    Last Visit: 21    Patient found in Outcomes MTM and is not currently eligible for CMR/TIP    ASSESSMENT    STATIN ADHERENCE    Per Insurance Records through 21 (YTD Kalen Last = 69%; Potential Fail Date: 21): Atorvastatin 10mg last filled on 21 for 90 day supply. Next refill due: 21    Per chart, atorvastatin 10mg daily last rx'd 20 for #90, 6 refills - rx likely ?     Lab Results   Component Value Date/Time    Cholesterol, total 162 10/08/2019 11:18 AM    HDL Cholesterol 57 10/08/2019 11:18 AM    LDL, calculated 83 10/08/2019 11:18 AM    VLDL, calculated 22 10/08/2019 11:18 AM    Triglyceride 109 10/08/2019 11:18 AM    CHOL/HDL Ratio 4.0 2013 07:00 AM     ALT (SGPT)   Date Value Ref Range Status   10/08/2019 20 0 - 44 IU/L Final     AST (SGOT)   Date Value Ref Range Status   10/08/2019 45 (H) 0 - 40 IU/L Final     The 10-year ASCVD risk score (Jennifer Mendez et al., 2013) is: 8.5%    Values used to calculate the score:      Age: 48 years      Sex: Male      Is Non- : Yes      Diabetic: No      Tobacco smoker: No      Systolic Blood Pressure: 617 mmHg      Is BP treated: Yes      HDL Cholesterol: 57 mg/dL      Total Cholesterol: 162 mg/dL     PLAN  The following are interventions that have been identified:  - Patient overdue refilling atorvastatin (refill was due @ - likely needs reordered?) and active on home medication list    Attempting to reach patient to review.  Left message asking for return call. No future appointments.

## 2021-05-26 NOTE — TELEPHONE ENCOUNTER
Patient returning call. States he takes care of his medications and his mothers and they both take atorvastatin, so it's possible they ended up with an extra bottle/supply between the two of them - attempted to review if they take the same dose? Patient knows he doesn't miss atorvastatin, citing the importance of the medication.  Reviewed that PCP renewed/reordered atorvastatin rx to [facility].     =========================================================   For Pharmacy [id number] in place: No   Recommendation Provided To: Provider: 1 via Note to Provider  and Patient/Caregiver: 1 via Telephone   Intervention Detail: Adherence Monitorin and Refill(s) Provided   Gap Closed?: Yes   Total # of Interventions Recommended: 1   Total # of Interventions Accepted: 1   Intervention Accepted By: Provider: 1 and Patient/Caregiver: 1   Time Spent (min): 15

## 2022-03-18 PROBLEM — N18.30 CKD (CHRONIC KIDNEY DISEASE) STAGE 3, GFR 30-59 ML/MIN (HCC): Status: ACTIVE | Noted: 2018-03-05

## 2022-06-21 ENCOUNTER — TELEPHONE (OUTPATIENT)
Dept: INTERNAL MEDICINE CLINIC | Age: 57
End: 2022-06-21

## 2022-06-21 RX ORDER — OMEPRAZOLE 20 MG/1
CAPSULE, DELAYED RELEASE ORAL
Qty: 90 CAPSULE | Refills: 3 | Status: SHIPPED | OUTPATIENT
Start: 2022-06-21

## 2022-06-21 NOTE — TELEPHONE ENCOUNTER
----- Message from [facility] sent at 6/21/2022  8:22 AM EDT -----  Subject: Referral Request    QUESTIONS   Reason for referral request? Pt would like labs placed prior to august   appt and would also like it mailed to him   Has the physician seen you for this condition before? No   Preferred Specialist (if applicable)? Do you already have an appointment scheduled? No  Additional Information for Provider?   ---------------------------------------------------------------------------  --------------  CALL BACK INFO  What is the best way for the office to contact you? OK to leave message on   voicemail  Preferred Call Back Phone Number? [phone]  ---------------------------------------------------------------------------  --------------  SCRIPT ANSWERS  Relationship to Patient?  Self

## 2022-06-21 NOTE — TELEPHONE ENCOUNTER
PCP: Dianna Galeano MD    Last appt: 5/25/2021  Future Appointments   Date Time Provider Ashley Arambula   8/18/2022  3:00 PM Dianna Galeano MD [facility] BS AMB       Requested Prescriptions     Pending Prescriptions Disp Refills    omeprazole (PRILOSEC) 20 mg capsule 90 Capsule 3     Sig: TAKE ONE CAPSULE BY MOUTH ONCE DAILY       Prior labs and Blood pressures:  BP Readings from Last 3 Encounters:   10/08/19 120/81   02/08/19 126/83   08/09/18 126/90     Lab Results   Component Value Date/Time    Sodium 141 10/08/2019 11:18 AM    Potassium 4.4 10/08/2019 11:18 AM    Chloride 103 10/08/2019 11:18 AM    CO2 20 10/08/2019 11:18 AM    Anion gap 10 03/04/2018 04:53 PM    Glucose 93 10/08/2019 11:18 AM    BUN 18 10/08/2019 11:18 AM    Creatinine 1.49 (H) 10/08/2019 11:18 AM    BUN/Creatinine ratio 12 10/08/2019 11:18 AM    GFR est AA 61 10/08/2019 11:18 AM    GFR est non-AA 53 (L) 10/08/2019 11:18 AM    Calcium 9.3 10/08/2019 11:18 AM     Lab Results   Component Value Date/Time    Hemoglobin A1c 5.8 (H) 10/08/2019 11:18 AM     Lab Results   Component Value Date/Time    Cholesterol, total 162 10/08/2019 11:18 AM    HDL Cholesterol 57 10/08/2019 11:18 AM    LDL, calculated 83 10/08/2019 11:18 AM    VLDL, calculated 22 10/08/2019 11:18 AM    Triglyceride 109 10/08/2019 11:18 AM    CHOL/HDL Ratio 4.0 05/04/2013 07:00 AM     No results found for: Churdan Figures, VD3RIA    Lab Results   Component Value Date/Time    TSH 1.150 10/05/2017 02:48 PM

## 2022-06-24 DIAGNOSIS — I10 HYPERTENSION, UNSPECIFIED TYPE: Primary | ICD-10-CM

## 2022-06-24 DIAGNOSIS — Z12.5 PROSTATE CANCER SCREENING: ICD-10-CM

## 2022-06-24 DIAGNOSIS — E78.00 PURE HYPERCHOLESTEROLEMIA: ICD-10-CM

## 2022-06-24 DIAGNOSIS — R73.03 PREDIABETES: ICD-10-CM

## 2022-08-16 LAB
ALBUMIN SERPL-MCNC: 4.5 G/DL (ref 3.8–4.9)
ALBUMIN/GLOB SERPL: 2.1 {RATIO} (ref 1.2–2.2)
ALP SERPL-CCNC: 60 IU/L (ref 44–121)
ALT SERPL-CCNC: 18 IU/L (ref 0–44)
AST SERPL-CCNC: 40 IU/L (ref 0–40)
BILIRUB SERPL-MCNC: 0.3 MG/DL (ref 0–1.2)
BUN SERPL-MCNC: 15 MG/DL (ref 6–24)
BUN/CREAT SERPL: 10 (ref 9–20)
CALCIUM SERPL-MCNC: 9.3 MG/DL (ref 8.7–10.2)
CHLORIDE SERPL-SCNC: 105 MMOL/L (ref 96–106)
CHOLEST SERPL-MCNC: 181 MG/DL (ref 100–199)
CO2 SERPL-SCNC: 23 MMOL/L (ref 20–29)
CREAT SERPL-MCNC: 1.49 MG/DL (ref 0.76–1.27)
EGFR: 55 ML/MIN/1.73
ERYTHROCYTE [DISTWIDTH] IN BLOOD BY AUTOMATED COUNT: 13.8 % (ref 11.6–15.4)
EST. AVERAGE GLUCOSE BLD GHB EST-MCNC: 120 MG/DL
GLOBULIN SER CALC-MCNC: 2.1 G/DL (ref 1.5–4.5)
GLUCOSE SERPL-MCNC: 97 MG/DL (ref 65–99)
HBA1C MFR BLD: 5.8 % (ref 4.8–5.6)
HCT VFR BLD AUTO: 37.9 % (ref 37.5–51)
HDLC SERPL-MCNC: 59 MG/DL
HGB BLD-MCNC: 12.6 G/DL (ref 13–17.7)
LDLC SERPL CALC-MCNC: 94 MG/DL (ref 0–99)
MCH RBC QN AUTO: 29.3 PG (ref 26.6–33)
MCHC RBC AUTO-ENTMCNC: 33.2 G/DL (ref 31.5–35.7)
MCV RBC AUTO: 88 FL (ref 79–97)
PLATELET # BLD AUTO: 374 X10E3/UL (ref 150–450)
POTASSIUM SERPL-SCNC: 4.6 MMOL/L (ref 3.5–5.2)
PROT SERPL-MCNC: 6.6 G/DL (ref 6–8.5)
PSA SERPL-MCNC: 3.8 NG/ML (ref 0–4)
RBC # BLD AUTO: 4.3 X10E6/UL (ref 4.14–5.8)
SODIUM SERPL-SCNC: 142 MMOL/L (ref 134–144)
TRIGL SERPL-MCNC: 166 MG/DL (ref 0–149)
TSH SERPL DL<=0.005 MIU/L-ACNC: 1.27 UIU/ML (ref 0.45–4.5)
VLDLC SERPL CALC-MCNC: 28 MG/DL (ref 5–40)
WBC # BLD AUTO: 7.1 X10E3/UL (ref 3.4–10.8)

## 2022-08-18 ENCOUNTER — OFFICE VISIT (OUTPATIENT)
Dept: INTERNAL MEDICINE CLINIC | Age: 57
End: 2022-08-18
Payer: MEDICARE

## 2022-08-18 VITALS
TEMPERATURE: 98.2 F | WEIGHT: 156 LBS | RESPIRATION RATE: 16 BRPM | DIASTOLIC BLOOD PRESSURE: 80 MMHG | BODY MASS INDEX: 21.84 KG/M2 | SYSTOLIC BLOOD PRESSURE: 120 MMHG | HEIGHT: 71 IN | HEART RATE: 71 BPM | OXYGEN SATURATION: 99 %

## 2022-08-18 DIAGNOSIS — R07.9 CHEST PAIN, UNSPECIFIED TYPE: ICD-10-CM

## 2022-08-18 DIAGNOSIS — F41.9 ANXIETY: ICD-10-CM

## 2022-08-18 DIAGNOSIS — D64.9 ANEMIA, UNSPECIFIED TYPE: Primary | ICD-10-CM

## 2022-08-18 DIAGNOSIS — R97.20 ELEVATED PSA: ICD-10-CM

## 2022-08-18 DIAGNOSIS — N18.30 STAGE 3 CHRONIC KIDNEY DISEASE, UNSPECIFIED WHETHER STAGE 3A OR 3B CKD (HCC): ICD-10-CM

## 2022-08-18 PROCEDURE — G8510 SCR DEP NEG, NO PLAN REQD: HCPCS | Performed by: INTERNAL MEDICINE

## 2022-08-18 PROCEDURE — G8420 CALC BMI NORM PARAMETERS: HCPCS | Performed by: INTERNAL MEDICINE

## 2022-08-18 PROCEDURE — G8752 SYS BP LESS 140: HCPCS | Performed by: INTERNAL MEDICINE

## 2022-08-18 PROCEDURE — G0439 PPPS, SUBSEQ VISIT: HCPCS | Performed by: INTERNAL MEDICINE

## 2022-08-18 PROCEDURE — G8754 DIAS BP LESS 90: HCPCS | Performed by: INTERNAL MEDICINE

## 2022-08-18 PROCEDURE — 3017F COLORECTAL CA SCREEN DOC REV: CPT | Performed by: INTERNAL MEDICINE

## 2022-08-18 PROCEDURE — G8427 DOCREV CUR MEDS BY ELIG CLIN: HCPCS | Performed by: INTERNAL MEDICINE

## 2022-08-18 RX ORDER — ATORVASTATIN CALCIUM 10 MG/1
10 TABLET, FILM COATED ORAL DAILY
Qty: 90 TABLET | Refills: 4 | Status: SHIPPED | OUTPATIENT
Start: 2022-08-18

## 2022-08-18 RX ORDER — LORAZEPAM 0.5 MG/1
0.5 TABLET ORAL
Qty: 10 TABLET | Refills: 0 | Status: SHIPPED | OUTPATIENT
Start: 2022-08-18

## 2022-08-18 NOTE — PROGRESS NOTES
1. \"Have you been to the ER, urgent care clinic since your last visit? Hospitalized since your last visit? \" No    2. \"Have you seen or consulted any other health care providers outside of the [address] [place] [address] since your last visit? \" No     3. For patients aged 39-70: Has the patient had a colonoscopy / FIT/ Cologuard?  No

## 2022-08-18 NOTE — TELEPHONE ENCOUNTER
Future Appointments:  No future appointments. Last Appointment With Me:  8/18/2022     Requested Prescriptions     Pending Prescriptions Disp Refills    atorvastatin (LIPITOR) 10 mg tablet 90 Tablet 4     Sig: Take 1 Tablet by mouth daily. LORazepam (ATIVAN) 0.5 mg tablet 10 Tablet 0     Sig: Take 1 Tablet by mouth every eight (8) hours as needed for Anxiety.  Max Daily Amount: 1.5 mg.

## 2022-08-18 NOTE — PROGRESS NOTES
HISTORY OF PRESENT ILLNESS  Ulices Peacock is a 55 y.o. male. HPI  F/u HTN , hx hyperglycemia with FH DM-2, HLD. PTSD heme positive stool , solitary kidney ( proteinuria) and medicare wellness--  Psych Dr Luther Bennett on elavil for PTSD  Hydralazine bid for HTN  Had positive stool card from [facility] last OV--referred to GI MD but pt did not make the appt-last colonoscopy 2010  Had recent labs anemic Hb 12.6  PSA 3.8 a1c 5.8 LDL 94 cr 1.49  Mild OA symptoms otherwise feels well  Last Marixa Boudreaux  Was referred to GI MD last OV for heme positive stool Ruma Bundy Goodness  Last a1c 5.8  2019]     C/o left lower groin swelling over lat year but worse this year --has been having to lift his mother this year and worse now. No pain. worse with straining , mild pain with lifting     Anxiety and PTSD per psych MD--Dr Luther Bennett  Had mild covid lat November. Had covid shot this year completed last       Patient Active Problem List    Diagnosis Date Noted    CKD (chronic kidney disease) stage 3, GFR 30-59 ml/min ([facility] [address] 03/05/2018    Ulnar neuropathy at elbow of left upper extremity 09/07/2016    Cervical post-laminectomy syndrome 09/07/2016    Cervical radiculopathy due to degenerative joint disease of spine 09/07/2016    PPD positive 01/12/2015    ADD (attention deficit disorder with hyperactivity) 11/15/2011    Proteinuria 02/07/2011    FH: CAD (coronary artery disease) 12/16/2010    Solitary kidney 12/14/2010    Gout 12/14/2010    Elevated LFT's 12/14/2010    HTN (hypertension) 02/23/2010    Hyperglycemia 02/23/2010    PTSD (post-traumatic stress disorder) 02/23/2010    Chronic neck pain 02/23/2010    Anxiety 02/23/2010    Family history of diabetes mellitus 02/23/2010     Current Outpatient Medications   Medication Sig Dispense Refill    omeprazole (PRILOSEC) 20 mg capsule TAKE ONE CAPSULE BY MOUTH ONCE DAILY 90 Capsule 3    atorvastatin (LIPITOR) 10 mg tablet Take 1 Tablet by mouth daily.  90 Tablet 4    amitriptyline (ELAVIL) 100 mg tablet Take 100 mg by mouth At bedtime. tiZANidine (ZANAFLEX) 2 mg tablet TAKE 1 TO 2 TABLETS BY MOUTH THREE TIMES DAILY AS NEEDED FOR SPASMS 540 Tab 0    LORazepam (ATIVAN) 0.5 mg tablet Take 1 Tab by mouth every eight (8) hours as needed for Anxiety. Max Daily Amount: 1.5 mg. 10 Tab 0    hydrALAZINE (APRESOLINE) 25 mg tablet Take 25 mg by mouth two (2) times a day. pyridoxine, vitamin B6, (VITAMIN B-6) 100 mg tablet Take 1 Tab by mouth daily. 30 Tab 0    multivitamin (ONE A DAY) tablet Take 1 Tab by mouth daily. Cholecalciferol, Vitamin D3, 5,000 unit Tab Take 10,000 Int'l Units by mouth daily. Indications: VITAMIN D DEFICIENCY      aspirin 81 mg tablet Take 1 Tab by mouth daily.  30 Tab 11    tadalafiL (CIALIS) 20 mg tablet TAKE 1 TABLET BY MOUTH AS NEEDED 8 Tab 5     Allergies   Allergen Reactions    Morphine Itching     Social History     Tobacco Use    Smoking status: Former     Packs/day: 0.30     Types: Cigarettes    Smokeless tobacco: Never   Substance Use Topics    Alcohol use: No      Lab Results   Component Value Date/Time    WBC 7.1 08/15/2022 04:06 PM    HGB 12.6 (L) 08/15/2022 04:06 PM    HCT 37.9 08/15/2022 04:06 PM    PLATELET 695 97/87/3116 04:06 PM    MCV 88 08/15/2022 04:06 PM     Lab Results   Component Value Date/Time    Hemoglobin A1c 5.8 (H) 08/15/2022 04:06 PM    Hemoglobin A1c 5.8 (H) 10/08/2019 11:18 AM    Hemoglobin A1c 5.8 (H) 02/11/2019 08:21 AM    Glucose 97 08/15/2022 04:06 PM    Microalb/Creat ratio (ug/mg creat.) 482.4 (H) 10/05/2017 02:48 PM    LDL, calculated 94 08/15/2022 04:06 PM    LDL, calculated 83 10/08/2019 11:18 AM    Creatinine 1.49 (H) 08/15/2022 04:06 PM      Lab Results   Component Value Date/Time    Cholesterol, total 181 08/15/2022 04:06 PM    HDL Cholesterol 59 08/15/2022 04:06 PM    LDL, calculated 94 08/15/2022 04:06 PM    LDL, calculated 83 10/08/2019 11:18 AM    Triglyceride 166 (H) 08/15/2022 04:06 PM    CHOL/HDL Ratio 4.0 05/04/2013 07:00 AM     Lab Results   Component Value Date/Time    GFR est non-AA 53 (L) 10/08/2019 11:18 AM    GFR est AA 61 10/08/2019 11:18 AM    Creatinine 1.49 (H) 08/15/2022 04:06 PM    BUN 15 08/15/2022 04:06 PM    Sodium 142 08/15/2022 04:06 PM    Potassium 4.6 08/15/2022 04:06 PM    Chloride 105 08/15/2022 04:06 PM    CO2 23 08/15/2022 04:06 PM     Lab Results   Component Value Date/Time    Prostate Specific Ag 3.8 08/15/2022 04:06 PM    Prostate Specific Ag 2.4 10/08/2019 11:18 AM    Prostate Specific Ag 2.2 08/09/2018 04:32 PM     Lab Results   Component Value Date/Time    TSH 1.270 08/15/2022 04:06 PM      Lab Results   Component Value Date/Time    Glucose 97 08/15/2022 04:06 PM         ROS    Physical Exam  Vitals and nursing note reviewed. Constitutional:       General: He is not in acute distress. Appearance: Normal appearance. He is well-developed. Comments: Appears stated age   HENT:      Head: Normocephalic. Cardiovascular:      Rate and Rhythm: Normal rate and regular rhythm. Heart sounds: Normal heart sounds. Pulmonary:      Effort: Pulmonary effort is normal.      Breath sounds: Normal breath sounds. Abdominal:      Palpations: Abdomen is soft. Neurological:      Mental Status: He is alert. ASSESSMENT and PLAN    ICD-10-CM ICD-9-CM    1. Anemia, unspecified type  D64.9 285.9 REFERRAL TO GASTROENTEROLOGY      2. Stage 3 chronic kidney disease, unspecified whether stage 3a or 3b CKD (HCC)  N18.30 585.3 REFERRAL TO NEPHROLOGY      3. Elevated PSA  R97.20 790.93 REFERRAL TO UROLOGY      This is the Subsequent Medicare Annual Wellness Exam, performed 12 months or more after the Initial AWV or the last Subsequent AWV    I have reviewed the patient's medical history in detail and updated the computerized patient record.        Assessment/Plan   Education and counseling provided:  Are appropriate based on today's review and evaluation  End-of-Life planning (with patient's consent)  Lisette and covid vaccines recommended    1. Anemia, unspecified type/positive stool card  -     REFERRAL TO GASTROENTEROLOGY-pt will call to schedule laura  2. Stage 3 chronic kidney disease, unspecified whether stage 3a or 3b CKD (HCC)  -     REFERRAL TO NEPHROLOGY  3. Elevated PSA   Refer URO  4. HTN   Fair control. ? Ace or arm due to microalbuminuria-referred to Nephrologist  5. HLD   LDL at goal  6. Prediabetes   Stable a1c    Depression Risk Factor Screening     3 most recent PHQ Screens 8/18/2022   PHQ Not Done -   Little interest or pleasure in doing things Several days   Feeling down, depressed, irritable, or hopeless Several days   Total Score PHQ 2 2   Trouble falling or staying asleep, or sleeping too much Several days   Feeling tired or having little energy Several days   Poor appetite, weight loss, or overeating Nearly every day   Feeling bad about yourself - or that you are a failure or have let yourself or your family down Not at all   Trouble concentrating on things such as school, work, reading, or watching TV Not at all   Moving or speaking so slowly that other people could have noticed; or the opposite being so fidgety that others notice Not at all   Thoughts of being better off dead, or hurting yourself in some way Not at all   PHQ 9 Score 7   How difficult have these problems made it for you to do your work, take care of your home and get along with others Not difficult at all       Alcohol & Drug Abuse Risk Screen    Do you average more than 2 drinks per night or 14 drinks a week: No    On any one occasion in the past three months have you have had more than 4 drinks containing alcohol:  No          Functional Ability and Level of Safety    Hearing: Hearing is good. Activities of Daily Living: The home contains: handrails and grab bars  Patient does total self care      Ambulation: with mild difficulty     Fall Risk:  No flowsheet data found.    Abuse Screen:  Patient is not abused       Cognitive Screening    Has your family/caregiver stated any concerns about your memory: no     Cognitive Screening: Normal - Clock Drawing Test    Health Maintenance Due     Health Maintenance Due   Topic Date Due    Depression Screen  Never done    Shingrix Vaccine Age 49> (1 of 2) Never done    Colorectal Cancer Screening Combo  09/30/2020    Medicare Yearly Exam  11/26/2021    COVID-19 Vaccine (4 - Booster for Maldonado Peter series) 05/07/2022       Patient Care Team   Patient Care Team:  Sumi Rosales MD as PCP - General (Internal Medicine Physician)  Sumi Rosales MD as PCP - [facility]  Malik Fernandez RN as Ambulatory Care Manager  Seamus Hood MD (Orthopedic Surgery)  Meliza Mercado MD (Gastroenterology)  Dandy Galicia MD (Dermatology Physician)  Jennifer Patel MD (Orthopedic Surgery)  Dr. Jimenez Lau (Psychiatry)  Isabel Amezquita MD (Orthopedic Surgery)  Ba Amaro MD (Neurology)  Alicia Woody MD as Surgeon (General Surgery)    History     Patient Active Problem List   Diagnosis Code    HTN (hypertension) I10    Hyperglycemia R73.9    PTSD (post-traumatic stress disorder) F43.10    Chronic neck pain M54.2, G89.29    Anxiety F41.9    Family history of diabetes mellitus Z83.3    Solitary kidney NXT1518    Gout M10.9    Elevated LFT's     FH: CAD (coronary artery disease) Z82.49    Proteinuria R80.9    ADD (attention deficit disorder with hyperactivity)     PPD positive R76.11    Ulnar neuropathy at elbow of left upper extremity G56.22    Cervical post-laminectomy syndrome M96.1    Cervical radiculopathy due to degenerative joint disease of spine M47.22    CKD (chronic kidney disease) stage 3, GFR 30-59 ml/min ([facility] [address] N18.30     Past Medical History:   Diagnosis Date    Arthritis     gout    Chronic kidney disease     no left kidney (congenital);  Stage II kidney failure    Hypertension     MI (mitral incompetence)     5/10/13 Psychiatric disorder     Depression & PTSD    PTSD (post-traumatic stress disorder)     Seizures ([facility] [address]     ? r/t PTSD- last seizure 2011      Past Surgical History:   Procedure Laterality Date    HX ORTHOPAEDIC  2007    cervical anterior discectomy and fusion--Dr. Claudia Rausch     Current Outpatient Medications   Medication Sig Dispense Refill    omeprazole (PRILOSEC) 20 mg capsule TAKE ONE CAPSULE BY MOUTH ONCE DAILY 90 Capsule 3    atorvastatin (LIPITOR) 10 mg tablet Take 1 Tablet by mouth daily. 90 Tablet 4    amitriptyline (ELAVIL) 100 mg tablet Take 100 mg by mouth At bedtime. tiZANidine (ZANAFLEX) 2 mg tablet TAKE 1 TO 2 TABLETS BY MOUTH THREE TIMES DAILY AS NEEDED FOR SPASMS 540 Tab 0    LORazepam (ATIVAN) 0.5 mg tablet Take 1 Tab by mouth every eight (8) hours as needed for Anxiety. Max Daily Amount: 1.5 mg. 10 Tab 0    hydrALAZINE (APRESOLINE) 25 mg tablet Take 25 mg by mouth two (2) times a day. pyridoxine, vitamin B6, (VITAMIN B-6) 100 mg tablet Take 1 Tab by mouth daily. 30 Tab 0    multivitamin (ONE A DAY) tablet Take 1 Tab by mouth daily. Cholecalciferol, Vitamin D3, 5,000 unit Tab Take 10,000 Int'l Units by mouth daily. Indications: VITAMIN D DEFICIENCY      aspirin 81 mg tablet Take 1 Tab by mouth daily.  30 Tab 11    tadalafiL (CIALIS) 20 mg tablet TAKE 1 TABLET BY MOUTH AS NEEDED 8 Tab 5     Allergies   Allergen Reactions    Morphine Itching       Family History   Problem Relation Age of Onset    Diabetes Mother     Diabetes Father     Heart Disease Father     Cancer Father         prostate cancer    Hypertension Sister      Social History     Tobacco Use    Smoking status: Former     Packs/day: 0.30     Types: Cigarettes    Smokeless tobacco: Never   Substance Use Topics    Alcohol use: No         Kenny Guerrero MD

## 2022-08-18 NOTE — PATIENT INSTRUCTIONS
Office Policies    Phone calls/patient messages:            Please allow up to 24 hours for someone in the office to contact you about your call or message. Be mindful your provider may be out of the office or your message may require further review. We encourage you to use [facility] for your messages as this is a faster, more efficient way to communicate with our office                         Medication Refills:            Prescription medications require 48-72 business hours to process. We encourage you to use [facility] for your refills. For controlled medications: Please allow 72 business hours to process. Certain medications may require you to  a written prescription at our office. NO narcotic/controlled medications will be prescribed after 4pm Monday through Friday or on weekends              Form/Paperwork Completion:            Please note a $25 fee may incur for all paperwork for completed by our providers. We ask that you allow 7-10 business days. Pre-payment is due prior to picking up/faxing the completed form. You may also download your forms to [facility] to have your doctor print off. Medicare Wellness Visit, Male    The best way to live healthy is to have a lifestyle where you eat a well-balanced diet, exercise regularly, limit alcohol use, and quit all forms of tobacco/nicotine, if applicable. Regular preventive services are another way to keep healthy. Preventive services (vaccines, screening tests, monitoring & exams) can help personalize your care plan, which helps you manage your own care. Screening tests can find health problems at the earliest stages, when they are easiest to treat. [facility] follows the current, evidence-based guidelines published by the [facility] (USPSTF) when recommending preventive services for our patients.  Because we follow these guidelines, sometimes recommendations change over time as research supports it. (For example, a prostate screening blood test is no longer routinely recommended for men with no symptoms). Of course, you and your doctor may decide to screen more often for some diseases, based on your risk and co-morbidities (chronic disease you are already diagnosed with). Preventive services for you include:  - Medicare offers their members a free annual wellness visit, which is time for you and your primary care provider to discuss and plan for your preventive service needs. Take advantage of this benefit every year!  -All adults over age 72 should receive the recommended pneumonia vaccines. Current USPSTF guidelines recommend a series of two vaccines for the best pneumonia protection.   -All adults should have a flu vaccine yearly and tetanus vaccine every 10 years.  -All adults age 48 and older should receive the shingles vaccines (series of two vaccines). -All adults age 38-68 who are overweight should have a diabetes screening test once every three years.   -Other screening tests & preventive services for persons with diabetes include: an eye exam to screen for diabetic retinopathy, a kidney function test, a foot exam, and stricter control over your cholesterol.   -Cardiovascular screening for adults with routine risk involves an electrocardiogram (ECG) at intervals determined by the provider.   -Colorectal cancer screening should be done for adults age 54-65 with no increased risk factors for colorectal cancer. There are a number of acceptable methods of screening for this type of cancer. Each test has its own benefits and drawbacks. Discuss with your provider what is most appropriate for you during your annual wellness visit.  The different tests include: colonoscopy (considered the best screening method), a fecal occult blood test, a fecal DNA test, and sigmoidoscopy.  -All adults born between [facility] should be screened once for Hepatitis C.  -An Abdominal Aortic Aneurysm (AAA) Screening is recommended for men age 73-68 who has ever smoked in their lifetime.      Here is a list of your current Health Maintenance items (your personalized list of preventive services) with a due date:  Health Maintenance Due   Topic Date Due    Depresssion Screening  Never done    Shingles Vaccine (1 of 2) Never done    Colorectal Screening  09/30/2020    COVID-19 Vaccine (4 - Booster for Maldonado Peter series) 05/07/2022

## 2022-08-19 LAB
FERRITIN SERPL-MCNC: 73 NG/ML (ref 30–400)
IRON SATN MFR SERPL: 17 % (ref 15–55)
IRON SERPL-MCNC: 48 UG/DL (ref 38–169)
SPECIMEN STATUS REPORT, ROLRST: NORMAL
TIBC SERPL-MCNC: 284 UG/DL (ref 250–450)
UIBC SERPL-MCNC: 236 UG/DL (ref 111–343)

## 2022-12-14 ENCOUNTER — APPOINTMENT (OUTPATIENT)
Dept: GENERAL RADIOLOGY | Age: 57
End: 2022-12-14
Attending: PHYSICIAN ASSISTANT
Payer: MEDICARE

## 2022-12-14 ENCOUNTER — HOSPITAL ENCOUNTER (EMERGENCY)
Age: 57
Discharge: HOME | End: 2022-12-14
Attending: EMERGENCY MEDICINE
Payer: MEDICARE

## 2022-12-14 ENCOUNTER — TELEPHONE (OUTPATIENT)
Dept: INTERNAL MEDICINE CLINIC | Age: 57
End: 2022-12-14

## 2022-12-14 VITALS
HEIGHT: 68 IN | HEART RATE: 89 BPM | RESPIRATION RATE: 16 BRPM | SYSTOLIC BLOOD PRESSURE: 134 MMHG | OXYGEN SATURATION: 99 % | WEIGHT: 164.46 LBS | DIASTOLIC BLOOD PRESSURE: 107 MMHG | BODY MASS INDEX: 24.93 KG/M2 | TEMPERATURE: 99.3 F

## 2022-12-14 DIAGNOSIS — Z04.1 ENCOUNTER FOR EXAMINATION FOLLOWING MOTOR VEHICLE COLLISION (MVC): Primary | ICD-10-CM

## 2022-12-14 DIAGNOSIS — M54.2 BILATERAL NECK PAIN: ICD-10-CM

## 2022-12-14 DIAGNOSIS — R03.0 ELEVATED BLOOD PRESSURE READING: ICD-10-CM

## 2022-12-14 DIAGNOSIS — M54.50 RIGHT-SIDED LOW BACK PAIN WITHOUT SCIATICA, UNSPECIFIED CHRONICITY: ICD-10-CM

## 2022-12-14 DIAGNOSIS — T14.8XXA MUSCULOSKELETAL STRAIN: ICD-10-CM

## 2022-12-14 PROCEDURE — 73502 X-RAY EXAM HIP UNI 2-3 VIEWS: CPT

## 2022-12-14 PROCEDURE — 99283 EMERGENCY DEPT VISIT LOW MDM: CPT

## 2022-12-14 PROCEDURE — 72050 X-RAY EXAM NECK SPINE 4/5VWS: CPT

## 2022-12-14 PROCEDURE — 72100 X-RAY EXAM L-S SPINE 2/3 VWS: CPT

## 2022-12-14 RX ORDER — CYCLOBENZAPRINE HCL 10 MG
10 TABLET ORAL
Qty: 15 TABLET | Refills: 0 | Status: SHIPPED | OUTPATIENT
Start: 2022-12-14

## 2022-12-14 RX ORDER — LIDOCAINE 50 MG/G
PATCH TOPICAL
Qty: 1 EACH | Refills: 0 | Status: SHIPPED | OUTPATIENT
Start: 2022-12-14
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
## 2022-12-14 NOTE — Clinical Note
[address]  [facility] EMERGENCY DEPT  [address] [place] [address]  Kristel Bedoya [id number] [phone]    Work/School Note    Date: 12/14/2022    To Whom It May concern:    Hilda Tian was seen and treated today in the emergency room by the following provider(s):  Attending Provider: Almita Guerrero MD  Physician Assistant: Rocío Martinez. Hilda Tian is excused from work/school on 12/14/2022 through 12/16/2022. He is medically clear to return to work/school on 12/17/2022.          Sincerely,          MARI Mayo

## 2022-12-14 NOTE — DISCHARGE INSTRUCTIONS
Thank You! It was a pleasure taking care of you in our Emergency Department today. We know that when you come to [facility], you are entrusting us with your health, comfort, and safety. Our clinicians honor that trust, and truly appreciate the opportunity to care for you and your loved ones. We also value your feedback. If you receive a survey about your Emergency Department experience today, please fill it out. We care about our patients' feedback, and we listen to what you have to say. Thank you.     Kristine Dyer PA-C
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
## 2022-12-19 ENCOUNTER — TELEPHONE (OUTPATIENT)
Dept: INTERNAL MEDICINE CLINIC | Age: 57
End: 2022-12-19

## 2022-12-19 DIAGNOSIS — M25.519 ACUTE SHOULDER PAIN, UNSPECIFIED LATERALITY: Primary | ICD-10-CM
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
## 2022-12-19 NOTE — TELEPHONE ENCOUNTER
Spoke with patient using two identifiers. Advised patient referral letter will be sent in mail to home address to schedule appt and faxed to [address] [place] [address] per patient request. Referral mailed and referral fax confirmed.

## 2023-01-09 ENCOUNTER — TRANSCRIBE ORDER (OUTPATIENT)
Dept: SCHEDULING | Age: 58
End: 2023-01-09

## 2023-01-09 DIAGNOSIS — M54.12 RIGHT CERVICAL RADICULOPATHY: Primary | ICD-10-CM

## 2023-01-09 DIAGNOSIS — M48.02 CERVICAL SPINAL STENOSIS: ICD-10-CM

## 2023-01-09 DIAGNOSIS — M47.812 CERVICAL SPONDYLOSIS: ICD-10-CM

## 2023-01-13 ENCOUNTER — HOSPITAL ENCOUNTER (EMERGENCY)
Age: 58
Discharge: HOME | End: 2023-01-13
Attending: EMERGENCY MEDICINE
Payer: MEDICARE

## 2023-01-13 ENCOUNTER — TELEPHONE (OUTPATIENT)
Dept: INTERNAL MEDICINE CLINIC | Age: 58
End: 2023-01-13

## 2023-01-13 VITALS
TEMPERATURE: 99.9 F | DIASTOLIC BLOOD PRESSURE: 106 MMHG | WEIGHT: 160 LBS | RESPIRATION RATE: 20 BRPM | SYSTOLIC BLOOD PRESSURE: 188 MMHG | HEIGHT: 68 IN | HEART RATE: 93 BPM | BODY MASS INDEX: 24.25 KG/M2 | OXYGEN SATURATION: 98 %

## 2023-01-13 DIAGNOSIS — K04.7 ABSCESSED TOOTH: Primary | ICD-10-CM

## 2023-01-13 PROCEDURE — 74011250637 HC RX REV CODE- 250/637: Performed by: EMERGENCY MEDICINE

## 2023-01-13 PROCEDURE — 99283 EMERGENCY DEPT VISIT LOW MDM: CPT

## 2023-01-13 PROCEDURE — 74011000250 HC RX REV CODE- 250: Performed by: EMERGENCY MEDICINE

## 2023-01-13 RX ORDER — PENICILLIN V POTASSIUM 250 MG/1
500 TABLET, FILM COATED ORAL
Status: COMPLETED | OUTPATIENT
Start: 2023-01-13 | End: 2023-01-13

## 2023-01-13 RX ORDER — PENICILLIN V POTASSIUM 500 MG/1
500 TABLET, FILM COATED ORAL 4 TIMES DAILY
Qty: 28 TABLET | Refills: 0 | Status: SHIPPED | OUTPATIENT
Start: 2023-01-13 | End: 2023-01-20

## 2023-01-13 RX ORDER — HYDROCODONE BITARTRATE AND ACETAMINOPHEN 5; 325 MG/1; MG/1
1 TABLET ORAL
Qty: 6 TABLET | Refills: 0 | Status: SHIPPED | OUTPATIENT
Start: 2023-01-13 | End: 2023-01-18

## 2023-01-13 RX ADMIN — DIPHENHYDRAMINE HYDROCHLORIDE: 12.5 LIQUID ORAL at 01:23

## 2023-01-13 RX ADMIN — PENICILLIN V POTASSIUM 500 MG: 250 TABLET, FILM COATED ORAL at 01:24

## 2023-01-13 NOTE — ED TRIAGE NOTES
Pt presents ambulatory to the ED c/o lower left tooth, gum, and facial pain secondary to broken tooth and possible abscess x4 days PTA. Pt reports he has an appointment with a dental clinic tomorrow \"I couldn't wait because the pain is so bad. I can't take it anymore. \" Pt reports taking 1,000mg Tylenol at 2200 yesterday with no relief. Pt has some facial swelling noted on the left side of face.

## 2023-01-13 NOTE — DISCHARGE INSTRUCTIONS
Emergency [address] by KASHIF CHACON [facility]  [address] [facility], [address] [facility]  Open M, W, F: 8AM - 5PM and T, Th: 8AM-6PM  Phone: [phone], press 4  $70 for Emergency Care  $60 for first routine care, then pay by sliding scale based upon income. [facility]  [facility] [address]  Phone: [phone]    [employer]  [address] [facility]  Open Monday - Friday 8AM - 4:30 PM  Phone: [address] [place] [address] Dentistry Urgent [address] [place] [address] Dentistry, [address] [place] [address], [place] [address], [address] [place] [address] starting at 8:30 AM M-F  Phone: [phone], press 2  Fee: $150 per tooth (x-ray & extractions only)  Pediatrics Phone[de-identified] [phone], 8-5 M-F    [address] Dentistry, [address] [place] [address], [address]nd Floor, [address] [place] [address] starting at 8:30 AM - 3 PM [address] [place] [address] St  [address] [facility], [address] [place] [address]  Phone: [phone] or [phone]  Emergency Hours: 9:30AM - 11AM (extractions)  Simple tooth extraction $ per tooth. #75 for x-ray    [facility] Residents only, over the age of 25  Phone: [phone]. Leave message saying you need an appointment to register.   Hours: Tuesday Evenings

## 2023-01-13 NOTE — ED NOTES
Refer to triage note. Emergency Department Nursing Plan of Care       The Nursing Plan of Care is developed from the Nursing assessment and Emergency Department Attending provider initial evaluation. The plan of care may be reviewed in the ED Provider note.     The Plan of Care was developed with the following considerations:   Patient / Family readiness to learn indicated by:verbalized understanding  Persons(s) to be included in education: patient  Barriers to Learning/Limitations:No    Signed     Mignon Schroeder RN    1/13/2023   12:58 AM

## 2023-01-13 NOTE — ED PROVIDER NOTES
[facility] EMERGENCY DEPT  EMERGENCY DEPARTMENT ENCOUNTER       Pt Name: Stephany Urena  MRN: [id number]  [place] 1965  Date of evaluation: 1/13/2023  Provider: Virginia Burciaga MD   PCP: Antonio Olivares MD  Note Started: 12:59 AM 1/13/23     CHIEF COMPLAINT       Chief Complaint   Patient presents with    Dental Pain        HISTORY OF PRESENT ILLNESS: 1 or more elements      History From: patient, History limited by:  none     Stephany Urena is a 65 y.o. male who presents 2 days of left lower dental pain. Patient states he has had a longstanding dental fracture there and 2 days ago it started to bother him. The last 24 hours he has had increased swelling. Temp 99.5 on arrival.  He tried Tylenol at 10 PM for pain without significant relief. Denies recent antibiotics or dental visits. History of 1 kidney so has been advised to avoid NSAIDs. Nursing Notes were all reviewed and agreed with or any disagreements were addressed in the HPI. REVIEW OF SYSTEMS        Positives and Pertinent negatives as per HPI. PAST HISTORY     Past Medical History:  Past Medical History:   Diagnosis Date    Arthritis     gout    Chronic kidney disease     no left kidney (congenital);  Stage II kidney failure    Hypertension     MI (mitral incompetence)     5/10/13    Psychiatric disorder     Depression & PTSD    PTSD (post-traumatic stress disorder)     Seizures ([address]     ? r/t PTSD- last seizure 2011       Past Surgical History:  Past Surgical History:   Procedure Laterality Date    HX ORTHOPAEDIC  2007    cervical anterior discectomy and fusion--Dr. Jomar Whiteside       Family History:  Family History   Problem Relation Age of Onset    Diabetes Mother     Diabetes Father     Heart Disease Father     Cancer Father         prostate cancer    Hypertension Sister        Social History:  Social History     Tobacco Use    Smoking status: Former     Packs/day: 0.30     Types: Cigarettes    Smokeless tobacco: Never   Substance Use Topics Alcohol use: No    Drug use: No     Types: Prescription       Allergies: Allergies   Allergen Reactions    Morphine Itching       CURRENT MEDICATIONS      Discharge Medication List as of 1/13/2023  1:34 AM        CONTINUE these medications which have NOT CHANGED    Details   cyclobenzaprine (FLEXERIL) 10 mg tablet Take 1 Tablet by mouth three (3) times daily as needed for Muscle Spasm(s). , Normal, Disp-15 Tablet, R-0      lidocaine (LIDODERM) 5 % Apply patch to the affected area for 12 hours a day and remove for 12 hours a day., Normal, Disp-1 Each, R-0      atorvastatin (LIPITOR) 10 mg tablet Take 1 Tablet by mouth daily. , Normal, Disp-90 Tablet, R-4      LORazepam (ATIVAN) 0.5 mg tablet Take 1 Tablet by mouth every eight (8) hours as needed for Anxiety. Max Daily Amount: 1.5 mg., Normal, Disp-10 Tablet, R-0      omeprazole (PRILOSEC) 20 mg capsule TAKE ONE CAPSULE BY MOUTH ONCE DAILY, Normal, Disp-90 Capsule, R-3      tadalafiL (CIALIS) 20 mg tablet TAKE 1 TABLET BY MOUTH AS NEEDED, Normal, Disp-8 Tab, R-5      amitriptyline (ELAVIL) 100 mg tablet Take 100 mg by mouth At bedtime. , Historical Med      tiZANidine (ZANAFLEX) 2 mg tablet TAKE 1 TO 2 TABLETS BY MOUTH THREE TIMES DAILY AS NEEDED FOR SPASMS, Normal, Disp-540 Tab, R-0      hydrALAZINE (APRESOLINE) 25 mg tablet Take 25 mg by mouth two (2) times a day., Historical Med      pyridoxine, vitamin B6, (VITAMIN B-6) 100 mg tablet Take 1 Tab by mouth daily. , Normal, Disp-30 Tab, R-0      multivitamin (ONE A DAY) tablet Take 1 Tab by mouth daily. , Historical Med      Cholecalciferol, Vitamin D3, 5,000 unit Tab Take 10,000 Int'l Units by mouth daily. Indications: VITAMIN D DEFICIENCY, Historical Med      aspirin 81 mg tablet Take 1 Tab by mouth daily. , No Print, Disp-30 Tab, R-11             SCREENINGS               No data recorded         PHYSICAL EXAM      ED Triage Vitals [01/13/23 0046]   ED Encounter Vitals Group      BP (!) 188/106      Pulse (Heart Rate) 93 Resp Rate 20      Temp 99.9 °F (37.7 °C)      Temp src       O2 Sat (%) 98 %      Weight 160 lb      Height 5' 8\"        Physical Exam  Constitutional:       General: He is not in acute distress. Appearance: He is normal weight. He is not toxic-appearing. HENT:      Mouth/Throat:        Comments: Fracture 2 with visible purulence and erythema of surrounding gum tissue. No drainable fluid collection visible. Mild overlying soft tissue swelling of left jaw. Cardiovascular:      Rate and Rhythm: Normal rate. Pulmonary:      Effort: Pulmonary effort is normal.   Skin:     General: Skin is warm and dry. Capillary Refill: Capillary refill takes less than 2 seconds. Neurological:      Mental Status: He is alert. DIAGNOSTIC RESULTS   LABS:     No results found for this or any previous visit (from the past 12 hour(s)). EKG: If performed, independent interpretation documented below in the MDM section     RADIOLOGY:  Non-plain film images such as CT, Ultrasound and MRI are read by the radiologist. Plain radiographic images are visualized and preliminarily interpreted by the ED Provider with the findings documented in the MDM section. Interpretation per the Radiologist below, if available at the time of this note:     No results found. PROCEDURES   Unless otherwise noted below, none  Procedures       EMERGENCY DEPARTMENT COURSE and DIFFERENTIAL DIAGNOSIS/MDM   Vitals:    Vitals:    01/13/23 0046   BP: (!) 188/106   Pulse: 93   Resp: 20   Temp: 99.9 °F (37.7 °C)   SpO2: 98%   Weight: 72.6 kg (160 lb)   Height: 5' 8\" (1.727 m)        Patient was given the following medications:  Medications   penicillin v potassium (VEETID) tablet 500 mg (500 mg Oral Given 1/13/23 0124)   dental ball (lidocaine/Benadryl/Cetacaine) mixture ( Mucous Membrane Given 1/13/23 0123)       Medical Decision Making  History of kidney disease/1 kidney so will not give NSAIDs.     Risk  Prescription drug management. **PLEASE SEE ED COURSE DETAILS BELOW FOR FURTHER MDM DETAILS:         FINAL IMPRESSION     1. Abscessed tooth          DISPOSITION/PLAN   Viktor Sommer's  results have been reviewed with him. He has been counseled regarding his diagnosis, treatment, and plan. He verbally conveys understanding and agreement of the signs, symptoms, diagnosis, treatment and prognosis and additionally agrees to follow up as discussed. He also agrees with the care-plan and conveys that all of his questions have been answered. I have also provided discharge instructions for him that include: educational information regarding their diagnosis and treatment, and list of reasons why they would want to return to the ED prior to their follow-up appointment, should his condition change. PATIENT REFERRED TO:  Follow-up Information       Follow up With Specialties Details Why Contact Info    Soumya Leon MD Internal Medicine Physician   [address]  [facility] [address] [facility] [address]  [address] [address] [phone]      [facility] EMERGENCY DEPT Emergency Medicine  As needed, If symptoms worsen [address] [facility]              DISCHARGE MEDICATIONS:  Discharge Medication List as of 1/13/2023  1:34 AM        START taking these medications    Details   penicillin v potassium (VEETID) 500 mg tablet Take 1 Tablet by mouth four (4) times daily for 7 days. , Normal, Disp-28 Tablet, R-0      HYDROcodone-acetaminophen (Norco) 5-325 mg per tablet Take 1 Tablet by mouth every four (4) hours as needed for Pain for up to 5 days. Max Daily Amount: 6 Tablets., Normal, Disp-6 Tablet, R-0           CONTINUE these medications which have NOT CHANGED    Details   cyclobenzaprine (FLEXERIL) 10 mg tablet Take 1 Tablet by mouth three (3) times daily as needed for Muscle Spasm(s). , Normal, Disp-15 Tablet, R-0      lidocaine (LIDODERM) 5 % Apply patch to the affected area for 12 hours a day and remove for 12 hours a day., Normal, Disp-1 Each, R-0      atorvastatin (LIPITOR) 10 mg tablet Take 1 Tablet by mouth daily. , Normal, Disp-90 Tablet, R-4      LORazepam (ATIVAN) 0.5 mg tablet Take 1 Tablet by mouth every eight (8) hours as needed for Anxiety. Max Daily Amount: 1.5 mg., Normal, Disp-10 Tablet, R-0      omeprazole (PRILOSEC) 20 mg capsule TAKE ONE CAPSULE BY MOUTH ONCE DAILY, Normal, Disp-90 Capsule, R-3      tadalafiL (CIALIS) 20 mg tablet TAKE 1 TABLET BY MOUTH AS NEEDED, Normal, Disp-8 Tab, R-5      amitriptyline (ELAVIL) 100 mg tablet Take 100 mg by mouth At bedtime. , Historical Med      tiZANidine (ZANAFLEX) 2 mg tablet TAKE 1 TO 2 TABLETS BY MOUTH THREE TIMES DAILY AS NEEDED FOR SPASMS, Normal, Disp-540 Tab, R-0      hydrALAZINE (APRESOLINE) 25 mg tablet Take 25 mg by mouth two (2) times a day., Historical Med      pyridoxine, vitamin B6, (VITAMIN B-6) 100 mg tablet Take 1 Tab by mouth daily. , Normal, Disp-30 Tab, R-0      multivitamin (ONE A DAY) tablet Take 1 Tab by mouth daily. , Historical Med      Cholecalciferol, Vitamin D3, 5,000 unit Tab Take 10,000 Int'l Units by mouth daily. Indications: VITAMIN D DEFICIENCY, Historical Med      aspirin 81 mg tablet Take 1 Tab by mouth daily. , No Print, Disp-30 Tab, R-11               DISCONTINUED MEDICATIONS:  Discharge Medication List as of 1/13/2023  1:34 AM          I am the Primary Clinician of Record. Uzma Winter MD (electronically signed)    (Please note that parts of this dictation were completed with voice recognition software. Quite often unanticipated grammatical, syntax, homophones, and other interpretive errors are inadvertently transcribed by the computer software. Please disregards these errors.  Please excuse any errors that have escaped final proofreading.)
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
## 2023-01-17 ENCOUNTER — PATIENT OUTREACH (OUTPATIENT)
Dept: CASE MANAGEMENT | Age: 58
End: 2023-01-17

## 2023-01-17 ENCOUNTER — VIRTUAL VISIT (OUTPATIENT)
Dept: INTERNAL MEDICINE CLINIC | Age: 58
End: 2023-01-17
Payer: MEDICARE

## 2023-01-17 DIAGNOSIS — Z01.818 PREOP EXAMINATION: Primary | ICD-10-CM

## 2023-01-17 PROCEDURE — 99441 PR PHYS/QHP TELEPHONE EVALUATION 5-10 MIN: CPT | Performed by: INTERNAL MEDICINE

## 2023-01-17 NOTE — PROGRESS NOTES
HISTORY OF PRESENT ILLNESS  Hipolito Tracy is a 65 y.o. male. HPI  An electronic signature was used to authenticate this note. Hipolito Tracy, who was evaluated through a synchronous (real-time) audio only encounter, and/or his healthcare decision maker, is aware that it is a billable service, which includes applicable co-pays, with coverage as determined by his insurance carrier. He provided verbal consent to proceed and patient identification was verified. This visit was conducted pursuant to the emergency declaration under the [facility], [address] [place] [address] authority and the [employer] Act. A caregiver was present when appropriate. Ability to conduct physical exam was limited. The patient was located at: Home: [place] [address].  The provider was located at: Facility (Appt Department): [place] [address]    --Marino Ching MD on 1/17/2023 at 4:24 PM        Tc x 5 minutes     hx HTN , hx hyperglycemia with FH DM-2, HLD.  PTSD heme positive stool , solitary kidney ( proteinuria)   Needs clearance to teeth extraction at [facility] in 2 weeks  No f/c cp sob syncope  No difficulty in the past with surgery or anesthesia  Last OV  Psych Dr Marija Carrillo on elavil for PTSD  Hydralazine bid for HTN  [facility]--referred to GI MD but pt did not make the appt-last colonoscopy 2010  Had recent labs anemic Hb 12.6  PSA 3.8 a1c 5.8 LDL 94 cr 1.49  Mild OA symptoms otherwise feels well    Patient Active Problem List    Diagnosis Date Noted    CKD (chronic kidney disease) stage 3, GFR 30-59 ml/min ([facility]) 03/05/2018    Ulnar neuropathy at elbow of left upper extremity 09/07/2016    Cervical post-laminectomy syndrome 09/07/2016    Cervical radiculopathy due to degenerative joint disease of spine 09/07/2016    PPD positive 01/12/2015    ADD (attention deficit disorder with hyperactivity) 11/15/2011    Proteinuria 02/07/2011    FH: CAD (coronary artery disease) 12/16/2010    Solitary kidney 12/14/2010    Gout 12/14/2010    Elevated LFT's 12/14/2010    HTN (hypertension) 02/23/2010    Hyperglycemia 02/23/2010    PTSD (post-traumatic stress disorder) 02/23/2010    Chronic neck pain 02/23/2010    Anxiety 02/23/2010    Family history of diabetes mellitus 02/23/2010     Current Outpatient Medications   Medication Sig Dispense Refill    penicillin v potassium (VEETID) 500 mg tablet Take 1 Tablet by mouth four (4) times daily for 7 days. 28 Tablet 0    HYDROcodone-acetaminophen (Norco) 5-325 mg per tablet Take 1 Tablet by mouth every four (4) hours as needed for Pain for up to 5 days. Max Daily Amount: 6 Tablets. 6 Tablet 0    cyclobenzaprine (FLEXERIL) 10 mg tablet Take 1 Tablet by mouth three (3) times daily as needed for Muscle Spasm(s). 15 Tablet 0    lidocaine (LIDODERM) 5 % Apply patch to the affected area for 12 hours a day and remove for 12 hours a day. 1 Each 0    atorvastatin (LIPITOR) 10 mg tablet Take 1 Tablet by mouth daily. 90 Tablet 4    LORazepam (ATIVAN) 0.5 mg tablet Take 1 Tablet by mouth every eight (8) hours as needed for Anxiety. Max Daily Amount: 1.5 mg. 10 Tablet 0    omeprazole (PRILOSEC) 20 mg capsule TAKE ONE CAPSULE BY MOUTH ONCE DAILY 90 Capsule 3    tadalafiL (CIALIS) 20 mg tablet TAKE 1 TABLET BY MOUTH AS NEEDED 8 Tab 5    amitriptyline (ELAVIL) 100 mg tablet Take 100 mg by mouth At bedtime. tiZANidine (ZANAFLEX) 2 mg tablet TAKE 1 TO 2 TABLETS BY MOUTH THREE TIMES DAILY AS NEEDED FOR SPASMS 540 Tab 0    hydrALAZINE (APRESOLINE) 25 mg tablet Take 25 mg by mouth two (2) times a day. multivitamin (ONE A DAY) tablet Take 1 Tab by mouth daily. Cholecalciferol, Vitamin D3, 5,000 unit Tab Take 10,000 Int'l Units by mouth daily. Indications: VITAMIN D DEFICIENCY      aspirin 81 mg tablet Take 1 Tab by mouth daily.  30 Tab 11    pyridoxine, vitamin B6, (VITAMIN B-6) 100 mg tablet Take 1 Tab by mouth daily. (Patient not taking: Reported on 1/17/2023) 30 Tab 0     Allergies   Allergen Reactions    Morphine Itching      Lab Results   Component Value Date/Time    WBC 7.1 08/15/2022 04:06 PM    HGB 12.6 (L) 08/15/2022 04:06 PM    HCT 37.9 08/15/2022 04:06 PM    PLATELET 445 86/38/5444 04:06 PM    MCV 88 08/15/2022 04:06 PM     Lab Results   Component Value Date/Time    GFR est non-AA 53 (L) 10/08/2019 11:18 AM    GFR est AA 61 10/08/2019 11:18 AM    Creatinine 1.49 (H) 08/15/2022 04:06 PM    BUN 15 08/15/2022 04:06 PM    Sodium 142 08/15/2022 04:06 PM    Potassium 4.6 08/15/2022 04:06 PM    Chloride 105 08/15/2022 04:06 PM    CO2 23 08/15/2022 04:06 PM        ROS    Physical Exam    ASSESSMENT and PLAN    ICD-10-CM ICD-9-CM    1. Preop examination  Z01.818 V72.84 Ok to proceed as planned. No contraindications. Low risk for any complications.

## 2023-01-17 NOTE — LETTER
1/20/2023 9:51 AM    Mr. Altagracia Haas  [address]            Mr Moy Misbah,      My name is Micheal Hernández. I am a Care Manager with [employer]. I often work with patients who could benefit from additional support understanding and managing their health. We are committed to providing you excellent care. I have been unable to reach you on this at [phone] . Please contact me at [phone] if you would like additional help with community resources. We appreciate the confidence you've shown by selecting us to provide your healthcare needs and I look forward to hearing from you soon.        [facility],  Micheal Hernández, BSN, RN - Ambulatory

## 2023-01-17 NOTE — PROGRESS NOTES
1. Have you been to the ER, urgent care clinic since your last visit? Hospitalized since your last visit? [facility] for tooth inflammation     2. Have you seen or consulted any other health care providers outside of the [address] [place] [address] since your last visit? Include any pap smears or colon screening.        Dr. Fernández Bile

## 2023-01-20 NOTE — PROGRESS NOTES
Ambulatory Care Management Note        Date/Time:  1/20/2023 9:49 AM    This patient was received as a referral from  [address] [place] [address] for case management services. Multiple unsuccessful attempts have been made to contact this patient. Ambulatory Care Management Get-In-Touch letter mailed to the patient's address on file and an Unable-to-Contact [employer] message was sent to pt at this time.    ACM will monitor for a response from this pt over the next 2wks.   /dla

## 2023-01-25 ENCOUNTER — HOSPITAL ENCOUNTER (OUTPATIENT)
Dept: MRI IMAGING | Age: 58
Discharge: HOME | End: 2023-01-25
Attending: PHYSICAL MEDICINE & REHABILITATION
Payer: MEDICARE

## 2023-01-25 DIAGNOSIS — M48.02 CERVICAL SPINAL STENOSIS: ICD-10-CM

## 2023-01-25 DIAGNOSIS — M54.12 RIGHT CERVICAL RADICULOPATHY: ICD-10-CM

## 2023-01-25 DIAGNOSIS — M47.812 CERVICAL SPONDYLOSIS: ICD-10-CM

## 2023-01-25 PROCEDURE — 72141 MRI NECK SPINE W/O DYE: CPT

## 2023-02-06 ENCOUNTER — PATIENT OUTREACH (OUTPATIENT)
Dept: CASE MANAGEMENT | Age: 58
End: 2023-02-06

## 2023-02-06 NOTE — PROGRESS NOTES
Ambulatory Care Management Note        Date/Time:  2/6/2023 8:19 AM    This patient was received as a referral from  [address] [place] [address] for case management services. Multiple unsuccessful attempts have been made to contact patient. Ambulatory Care Management Get-in-Touch (GIT) letter was mailed to the patient's address on file, as well as, an Unable-to-Contact [employer] message w/no response to date .    No further outreach attempts are scheduled by [facility] at this time.    Rafy Monroy

## 2023-03-03 ENCOUNTER — TELEPHONE (OUTPATIENT)
Dept: INTERNAL MEDICINE CLINIC | Age: 58
End: 2023-03-03

## 2023-03-03 NOTE — TELEPHONE ENCOUNTER
----- Message from Bushra Berrios sent at 3/3/2023  8:13 AM EST -----  Subject: Message to Provider    QUESTIONS  Information for Provider? Patient wants to be able to come in before his   appointment in May for a follow up from bloodwork. Is there anything open   sooner? Please advise.  ---------------------------------------------------------------------------  --------------  Preston LEIGH  [phone]; OK to leave message on voicemail  ---------------------------------------------------------------------------  --------------  SCRIPT ANSWERS  Relationship to Patient?  Self

## 2023-03-05 VITALS
OXYGEN SATURATION: 100 % | HEIGHT: 73 IN | TEMPERATURE: 98.1 F | SYSTOLIC BLOOD PRESSURE: 146 MMHG | WEIGHT: 157.41 LBS | BODY MASS INDEX: 20.86 KG/M2 | HEART RATE: 96 BPM | RESPIRATION RATE: 18 BRPM | DIASTOLIC BLOOD PRESSURE: 94 MMHG

## 2023-03-05 PROCEDURE — 99284 EMERGENCY DEPT VISIT MOD MDM: CPT

## 2023-03-06 ENCOUNTER — APPOINTMENT (OUTPATIENT)
Dept: CT IMAGING | Age: 58
End: 2023-03-06
Attending: EMERGENCY MEDICINE
Payer: MEDICARE

## 2023-03-06 ENCOUNTER — HOSPITAL ENCOUNTER (EMERGENCY)
Age: 58
Discharge: HOME | End: 2023-03-06
Attending: EMERGENCY MEDICINE
Payer: MEDICARE

## 2023-03-06 DIAGNOSIS — R10.9 ACUTE ABDOMINAL PAIN: ICD-10-CM

## 2023-03-06 DIAGNOSIS — K08.89 DENTALGIA: Primary | ICD-10-CM

## 2023-03-06 DIAGNOSIS — K52.9 ENTEROCOLITIS: ICD-10-CM

## 2023-03-06 DIAGNOSIS — K04.7 DENTAL INFECTION: ICD-10-CM

## 2023-03-06 DIAGNOSIS — N18.9 CHRONIC KIDNEY DISEASE, UNSPECIFIED CKD STAGE: ICD-10-CM

## 2023-03-06 LAB
ALBUMIN SERPL-MCNC: 3.9 G/DL (ref 3.5–5)
ALBUMIN/GLOB SERPL: 1.1 (ref 1.1–2.2)
ALP SERPL-CCNC: 48 U/L (ref 45–117)
ALT SERPL-CCNC: 31 U/L (ref 12–78)
ANION GAP SERPL CALC-SCNC: 5 MMOL/L (ref 5–15)
APPEARANCE UR: CLEAR
AST SERPL-CCNC: 46 U/L (ref 15–37)
BACTERIA URNS QL MICRO: NEGATIVE /HPF
BASOPHILS # BLD: 0 K/UL (ref 0–0.1)
BASOPHILS NFR BLD: 0 % (ref 0–1)
BILIRUB SERPL-MCNC: 0.4 MG/DL (ref 0.2–1)
BILIRUB UR QL: NEGATIVE
BUN SERPL-MCNC: 15 MG/DL (ref 6–20)
BUN/CREAT SERPL: 9 (ref 12–20)
CALCIUM SERPL-MCNC: 9 MG/DL (ref 8.5–10.1)
CHLORIDE SERPL-SCNC: 108 MMOL/L (ref 97–108)
CO2 SERPL-SCNC: 23 MMOL/L (ref 21–32)
COLOR UR: ABNORMAL
CREAT SERPL-MCNC: 1.59 MG/DL (ref 0.7–1.3)
DIFFERENTIAL METHOD BLD: ABNORMAL
EOSINOPHIL # BLD: 0 K/UL (ref 0–0.4)
EOSINOPHIL NFR BLD: 0 % (ref 0–7)
EPITH CASTS URNS QL MICRO: ABNORMAL /LPF
ERYTHROCYTE [DISTWIDTH] IN BLOOD BY AUTOMATED COUNT: 13.8 % (ref 11.5–14.5)
GLOBULIN SER CALC-MCNC: 3.6 G/DL (ref 2–4)
GLUCOSE SERPL-MCNC: 118 MG/DL (ref 65–100)
GLUCOSE UR STRIP.AUTO-MCNC: NEGATIVE MG/DL
HCT VFR BLD AUTO: 40.7 % (ref 36.6–50.3)
HGB BLD-MCNC: 13.3 G/DL (ref 12.1–17)
HGB UR QL STRIP: NEGATIVE
HYALINE CASTS URNS QL MICRO: ABNORMAL /LPF (ref 0–2)
IMM GRANULOCYTES # BLD AUTO: 0 K/UL (ref 0–0.04)
IMM GRANULOCYTES NFR BLD AUTO: 0 % (ref 0–0.5)
KETONES UR QL STRIP.AUTO: ABNORMAL MG/DL
LEUKOCYTE ESTERASE UR QL STRIP.AUTO: NEGATIVE
LIPASE SERPL-CCNC: 158 U/L (ref 73–393)
LYMPHOCYTES # BLD: 3.6 K/UL (ref 0.8–3.5)
LYMPHOCYTES NFR BLD: 30 % (ref 12–49)
MCH RBC QN AUTO: 29.6 PG (ref 26–34)
MCHC RBC AUTO-ENTMCNC: 32.7 G/DL (ref 30–36.5)
MCV RBC AUTO: 90.6 FL (ref 80–99)
MONOCYTES # BLD: 1 K/UL (ref 0–1)
MONOCYTES NFR BLD: 8 % (ref 5–13)
NEUTS SEG # BLD: 7.5 K/UL (ref 1.8–8)
NEUTS SEG NFR BLD: 62 % (ref 32–75)
NITRITE UR QL STRIP.AUTO: NEGATIVE
NRBC # BLD: 0 K/UL (ref 0–0.01)
NRBC BLD-RTO: 0 PER 100 WBC
PH UR STRIP: 5.5 (ref 5–8)
PLATELET # BLD AUTO: 323 K/UL (ref 150–400)
PMV BLD AUTO: 10.1 FL (ref 8.9–12.9)
POTASSIUM SERPL-SCNC: 3.6 MMOL/L (ref 3.5–5.1)
PROT SERPL-MCNC: 7.5 G/DL (ref 6.4–8.2)
PROT UR STRIP-MCNC: 100 MG/DL
RBC # BLD AUTO: 4.49 M/UL (ref 4.1–5.7)
RBC #/AREA URNS HPF: ABNORMAL /HPF (ref 0–5)
SODIUM SERPL-SCNC: 136 MMOL/L (ref 136–145)
SP GR UR REFRACTOMETRY: 1.01
UA: UC IF INDICATED,UAUC: ABNORMAL
UROBILINOGEN UR QL STRIP.AUTO: 0.2 EU/DL (ref 0.2–1)
WBC # BLD AUTO: 12.2 K/UL (ref 4.1–11.1)
WBC URNS QL MICRO: ABNORMAL /HPF (ref 0–4)

## 2023-03-06 PROCEDURE — 85025 COMPLETE CBC W/AUTO DIFF WBC: CPT

## 2023-03-06 PROCEDURE — 36415 COLL VENOUS BLD VENIPUNCTURE: CPT

## 2023-03-06 PROCEDURE — 81001 URINALYSIS AUTO W/SCOPE: CPT

## 2023-03-06 PROCEDURE — 74011250637 HC RX REV CODE- 250/637: Performed by: EMERGENCY MEDICINE

## 2023-03-06 PROCEDURE — 74176 CT ABD & PELVIS W/O CONTRAST: CPT

## 2023-03-06 PROCEDURE — 74011000250 HC RX REV CODE- 250: Performed by: EMERGENCY MEDICINE

## 2023-03-06 PROCEDURE — 80053 COMPREHEN METABOLIC PANEL: CPT

## 2023-03-06 PROCEDURE — 83690 ASSAY OF LIPASE: CPT

## 2023-03-06 RX ORDER — ONDANSETRON 4 MG/1
4 TABLET, FILM COATED ORAL
Qty: 20 TABLET | Refills: 0 | Status: SHIPPED | OUTPATIENT
Start: 2023-03-06

## 2023-03-06 RX ORDER — KETOROLAC TROMETHAMINE 30 MG/ML
15 INJECTION, SOLUTION INTRAMUSCULAR; INTRAVENOUS
Status: DISCONTINUED | OUTPATIENT
Start: 2023-03-06 | End: 2023-03-06 | Stop reason: HOSPADM

## 2023-03-06 RX ORDER — DICYCLOMINE HYDROCHLORIDE 20 MG/1
20 TABLET ORAL EVERY 6 HOURS
Qty: 20 TABLET | Refills: 0 | Status: SHIPPED | OUTPATIENT
Start: 2023-03-06

## 2023-03-06 RX ORDER — PENICILLIN V POTASSIUM 500 MG/1
500 TABLET, FILM COATED ORAL 4 TIMES DAILY
Qty: 28 TABLET | Refills: 0 | Status: SHIPPED | OUTPATIENT
Start: 2023-03-06 | End: 2023-03-13

## 2023-03-06 RX ORDER — LOPERAMIDE HYDROCHLORIDE 2 MG/1
2 CAPSULE ORAL
Qty: 20 CAPSULE | Refills: 0 | Status: SHIPPED | OUTPATIENT
Start: 2023-03-06 | End: 2023-03-16

## 2023-03-06 RX ORDER — ONDANSETRON 2 MG/ML
4 INJECTION INTRAMUSCULAR; INTRAVENOUS
Status: DISCONTINUED | OUTPATIENT
Start: 2023-03-06 | End: 2023-03-06 | Stop reason: HOSPADM

## 2023-03-06 RX ADMIN — BENZOCAINE, BUTAMBEN, AND TETRACAINE HYDROCHLORIDE: .028; .004; .004 AEROSOL, SPRAY TOPICAL at 00:30

## 2023-03-06 NOTE — ED PROVIDER NOTES
EMERGENCY DEPARTMENT HISTORY AND PHYSICAL EXAM            Please note that this dictation was completed with the assistance of \"Dragon\", the computer voice recognition software. Quite often unanticipated grammatical, syntax, homophones, and other interpretive errors are inadvertently transcribed by the computer software. Please disregard these errors and any errors that have escaped final proofreading. Thank you. Date of Evaluation: 03/06/23  Patient: Ruben Oneill  Patient Age and Sex: 65 y.o. male   MRN: [id number]  CSN: [id number]  PCP: Ember Armijo MD    History of Present Illness     Chief Complaint   Patient presents with    Dental Pain     Left sided lower dental pain, patient was seen at [address] [place] [address] for the same issues on 01/18/23 was given penicillin to clear up the infection. Patient states that the infection went away and then came back. Patient has done salt water treatment at home with no relief. Abdominal Pain     Patient reports significant abdominal pain and vomiting/diarrhea associated with symptoms. Patient states it started recently after being diagnosed with the abcess, preparing to receive an appointment for it to be removed. Pt reports explosive diarrhea and dizziness after triage, triage updated to reflect these events. History Provided By: Patient/family/EMS (if available)    HPI Limitations : None    HPI: Ruben Oneill, 65 y.o. male with past medical history as documented below presents to the ED with c/o of several days of left lower dental pain. Pt was seen at [address] [place] [address] recently for the same and just finished a course of penicillin. Pt has tried salt water treatment at home without relief. Pt also notes having several days of abdominal cramping, vomiting and diarrhea. Pt states no recent antibiotics. No meds PTA. Pt denies any other exacerbating or ameliorating factors.  There are no other complaints, changes or physical findings pertinent to the HPI at this time.    Nursing Notes were all reviewed and agreed with or any disagreements were addressed in the HPI. Past History   Past Medical History:  Past Medical History:   Diagnosis Date    Arthritis     gout    CKD (chronic kidney disease) stage 3, GFR 30-59 ml/min ([facility])     no left kidney (congenital); Stage II kidney failure    HLD (hyperlipidemia)     Hypertension     MI (mitral incompetence)     5/10/13    Psychiatric disorder     Depression & PTSD    PTSD (post-traumatic stress disorder)     Seizures ([address]     ? r/t PTSD- last seizure 2011       Past Surgical History:  Past Surgical History:   Procedure Laterality Date    HX ORTHOPAEDIC  2007    cervical anterior discectomy and fusion--Dr. General Lane       Family History:   Family history reviewed and was non-contributory, unless specified below:  Family History   Problem Relation Age of Onset    Diabetes Mother     Diabetes Father     Heart Disease Father     Cancer Father         prostate cancer    Hypertension Sister        Social History:  Social History     Tobacco Use    Smoking status: Former     Packs/day: 0.30     Types: Cigarettes    Smokeless tobacco: Never   Substance Use Topics    Alcohol use: No    Drug use: No     Types: Prescription       Allergies: Allergies   Allergen Reactions    Morphine Itching       Current Medications:  No current facility-administered medications on file prior to encounter. Current Outpatient Medications on File Prior to Encounter   Medication Sig Dispense Refill    cyclobenzaprine (FLEXERIL) 10 mg tablet Take 1 Tablet by mouth three (3) times daily as needed for Muscle Spasm(s). 15 Tablet 0    lidocaine (LIDODERM) 5 % Apply patch to the affected area for 12 hours a day and remove for 12 hours a day. 1 Each 0    atorvastatin (LIPITOR) 10 mg tablet Take 1 Tablet by mouth daily. 90 Tablet 4    LORazepam (ATIVAN) 0.5 mg tablet Take 1 Tablet by mouth every eight (8) hours as needed for Anxiety.  Max Daily Amount: 1.5 mg. 10 Tablet 0    omeprazole (PRILOSEC) 20 mg capsule TAKE ONE CAPSULE BY MOUTH ONCE DAILY 90 Capsule 3    tadalafiL (CIALIS) 20 mg tablet TAKE 1 TABLET BY MOUTH AS NEEDED 8 Tab 5    amitriptyline (ELAVIL) 100 mg tablet Take 100 mg by mouth At bedtime. tiZANidine (ZANAFLEX) 2 mg tablet TAKE 1 TO 2 TABLETS BY MOUTH THREE TIMES DAILY AS NEEDED FOR SPASMS 540 Tab 0    hydrALAZINE (APRESOLINE) 25 mg tablet Take 25 mg by mouth two (2) times a day. pyridoxine, vitamin B6, (VITAMIN B-6) 100 mg tablet Take 1 Tab by mouth daily. (Patient not taking: Reported on 1/17/2023) 30 Tab 0    multivitamin (ONE A DAY) tablet Take 1 Tab by mouth daily. Cholecalciferol, Vitamin D3, 5,000 unit Tab Take 10,000 Int'l Units by mouth daily. Indications: VITAMIN D DEFICIENCY      aspirin 81 mg tablet Take 1 Tab by mouth daily. 30 Tab 11       Review of Systems   A complete ROS was reviewed by me today and all other systems negative, unless otherwise specified below:  Review of Systems   Constitutional: Negative. Negative for chills and fever. HENT:  Positive for dental problem. Negative for congestion and sore throat. Eyes: Negative. Respiratory: Negative. Negative for cough, chest tightness, shortness of breath and wheezing. Cardiovascular: Negative. Negative for chest pain, palpitations and leg swelling. Gastrointestinal:  Positive for abdominal pain, diarrhea, nausea and vomiting. Negative for abdominal distention, blood in stool and constipation. Endocrine: Negative. Genitourinary: Negative. Negative for difficulty urinating, dysuria, flank pain, frequency, hematuria and urgency. Musculoskeletal: Negative. Negative for back pain and neck stiffness. Skin: Negative. Negative for rash. Allergic/Immunologic: Negative. Neurological: Negative. Negative for dizziness, syncope, weakness, light-headedness, numbness and headaches. Hematological: Negative. Psychiatric/Behavioral: Negative.   Negative for confusion and self-injury. Physical Exam   Patient Vitals for the past 24 hrs:   Temp Pulse Resp BP SpO2   03/05/23 2312 98.1 °F (36.7 °C) 96 18 (!) 146/94 100 %       Physical Exam  Vitals and nursing note reviewed. Constitutional:       Appearance: He is well-developed. He is not toxic-appearing. HENT:      Head: Normocephalic and atraumatic. Comments: TTP tooth #17,18, dental caries, surrounding gingival induration, no abscess noted     Mouth/Throat:      Pharynx: No posterior oropharyngeal erythema. Eyes:      Conjunctiva/sclera: Conjunctivae normal.      Pupils: Pupils are equal, round, and reactive to light. Cardiovascular:      Rate and Rhythm: Normal rate and regular rhythm. Heart sounds: Normal heart sounds. No murmur heard. No friction rub. No gallop. Pulmonary:      Effort: Pulmonary effort is normal. No respiratory distress. Breath sounds: Normal breath sounds. No wheezing or rales. Chest:      Chest wall: No tenderness. Abdominal:      General: Bowel sounds are normal. There is no distension. Palpations: Abdomen is soft. There is no mass. Tenderness: There is generalized abdominal tenderness. There is no guarding or rebound. Musculoskeletal:         General: No tenderness or deformity. Normal range of motion. Cervical back: Normal range of motion. Skin:     General: Skin is warm. Findings: No rash. Neurological:      Mental Status: He is alert and oriented to person, place, and time. Cranial Nerves: No cranial nerve deficit. Motor: No abnormal muscle tone. Coordination: Coordination normal.      Deep Tendon Reflexes: Reflexes normal.   Psychiatric:         Behavior: Behavior is cooperative. Diagnostic Studies     LABORATORY RESULTS:  I have personally reviewed and interpreted all available laboratory results.    Recent Results (from the past 24 hour(s))   CBC WITH AUTOMATED DIFF    Collection Time: 03/06/23 12:13 AM Result Value Ref Range    WBC 12.2 (H) 4.1 - 11.1 K/uL    RBC 4.49 4. 10 - 5.70 M/uL    HGB 13.3 12.1 - 17.0 g/dL    HCT 40.7 36.6 - 50.3 %    MCV 90.6 80.0 - 99.0 FL    MCH 29.6 26.0 - 34.0 PG    MCHC 32.7 30.0 - 36.5 g/dL    RDW 13.8 11.5 - 14.5 %    PLATELET 888 780 - 740 K/uL    MPV 10.1 8.9 - 12.9 FL    NRBC 0.0 0  WBC    ABSOLUTE NRBC 0.00 0.00 - 0.01 K/uL    NEUTROPHILS 62 32 - 75 %    LYMPHOCYTES 30 12 - 49 %    MONOCYTES 8 5 - 13 %    EOSINOPHILS 0 0 - 7 %    BASOPHILS 0 0 - 1 %    IMMATURE GRANULOCYTES 0 0.0 - 0.5 %    ABS. NEUTROPHILS 7.5 1.8 - 8.0 K/UL    ABS. LYMPHOCYTES 3.6 (H) 0.8 - 3.5 K/UL    ABS. MONOCYTES 1.0 0.0 - 1.0 K/UL    ABS. EOSINOPHILS 0.0 0.0 - 0.4 K/UL    ABS. BASOPHILS 0.0 0.0 - 0.1 K/UL    ABS. IMM. GRANS. 0.0 0.00 - 0.04 K/UL    DF AUTOMATED     METABOLIC PANEL, COMPREHENSIVE    Collection Time: 03/06/23 12:13 AM   Result Value Ref Range    Sodium 136 136 - 145 mmol/L    Potassium 3.6 3.5 - 5.1 mmol/L    Chloride 108 97 - 108 mmol/L    CO2 23 21 - 32 mmol/L    Anion gap 5 5 - 15 mmol/L    Glucose 118 (H) 65 - 100 mg/dL    BUN 15 6 - 20 MG/DL    Creatinine 1.59 (H) 0.70 - 1.30 MG/DL    BUN/Creatinine ratio 9 (L) 12 - 20      eGFR 51 (L) >60 ml/min/1.73m2    Calcium 9.0 8.5 - 10.1 MG/DL    Bilirubin, total 0.4 0.2 - 1.0 MG/DL    ALT (SGPT) 31 12 - 78 U/L    AST (SGOT) 46 (H) 15 - 37 U/L    Alk.  phosphatase 48 45 - 117 U/L    Protein, total 7.5 6.4 - 8.2 g/dL    Albumin 3.9 3.5 - 5.0 g/dL    Globulin 3.6 2.0 - 4.0 g/dL    A-G Ratio 1.1 1.1 - 2.2     LIPASE    Collection Time: 03/06/23 12:13 AM   Result Value Ref Range    Lipase 158 73 - 393 U/L   URINALYSIS W/ REFLEX CULTURE    Collection Time: 03/06/23  1:53 AM    Specimen: Urine   Result Value Ref Range    Color YELLOW/STRAW      Appearance CLEAR CLEAR      Specific gravity 1.013      pH (UA) 5.5 5.0 - 8.0      Protein 100 (A) NEG mg/dL    Glucose Negative NEG mg/dL    Ketone TRACE (A) NEG mg/dL    Bilirubin Negative NEG Blood Negative NEG      Urobilinogen 0.2 0.2 - 1.0 EU/dL    Nitrites Negative NEG      Leukocyte Esterase Negative NEG      UA:UC IF INDICATED CULTURE NOT INDICATED BY UA RESULT CNI      WBC 0-4 0 - 4 /hpf    RBC 0-5 0 - 5 /hpf    Epithelial cells FEW FEW /lpf    Bacteria Negative NEG /hpf    Hyaline cast 0-2 0 - 2 /lpf       RADIOLOGY RESULTS:  I have personally reviewed and interpreted all available imaging studies and agree with radiology interpretation. CT ABD PELV WO CONT   Final Result   Diffusely fluid-filled small large bowel can be seen with enterocolitis due to   nonspecific infection or inflammation. Solitary right kidney. CT Results  (Last 48 hours)                 03/06/23 0117  CT ABD PELV WO CONT Final result    Impression:  Diffusely fluid-filled small large bowel can be seen with enterocolitis due to   nonspecific infection or inflammation. Solitary right kidney. Narrative:  EXAM:  CT ABD PELV WO CONT       INDICATION: Abdominal pain and diarrhea. COMPARISON: Ultrasound 9/21/2010. TECHNIQUE: Helical CT of the abdomen  and pelvis  without contrast. Coronal and   sagittal reformats are performed. CT dose reduction was achieved through use of   a standardized protocol tailored for this examination and automatic exposure   control for dose modulation. FINDINGS:    Solid organ evaluation is limited without contrast.        The visualized lung bases demonstrate no mass or consolidation. The heart size   is normal. There is no pericardial or pleural effusion. There is a solitary right kidney without  calculus or hydronephrosis. The liver, spleen, pancreas, and adrenal glands are normal.  The gall bladder is   present  without intra- or extra-hepatic biliary dilatation. The small and large bowel are diffusely fluid-filled. There are no dilated bowel   loops.   The appendix is normal. There is diffuse colonic diverticulosis without   focal adjacent inflammation. There are no enlarged lymph nodes. There is no free fluid or free air. The   aorta tapers without aneurysm. The urinary bladder is unremarkable. There is no pelvic mass. The bony structures are age-appropriate. CXR Results  (Last 48 hours)      None          CT ABD PELV WO CONT   Final Result   Diffusely fluid-filled small large bowel can be seen with enterocolitis due to   nonspecific infection or inflammation. Solitary right kidney. MEDICAL DECISION MAKING   I am the first and primary ED physician for this patient's ED visit today. I reviewed our EMR for any past records that may contribute to the patient's current condition, including their past medical, surgical, social and family history. This also includes their most recent ED visits, previous hospitalizations and prior diagnostic data. I have reviewed and summarized the most pertinent findings in my HPI and MDM. Vital Signs Reviewed:  Patient Vitals for the past 24 hrs:   Temp Pulse Resp BP SpO2   03/05/23 2312 98.1 °F (36.7 °C) 96 18 (!) 146/94 100 %     Pulse Oximetry Analysis: 100% on RA with good pleth    Cardiac Monitor:   Rate: 90 bpm  The cardiac monitor revealed the following rhythm as interpreted by me: Normal Sinus Rhythm  Cardiac monitoring was ordered to monitor patient for signs of cardiac dysrhythmia, which they are at risk for based on their history and/or risk for cardiovascular disease and/or metabolic abnormalities.      Records Reviewed: Nursing Notes, Old Medical Records, Previous electrocardiograms, Previous Radiology Studies and Previous Laboratory Studies, EMS reports    DIFFERENTIAL DIAGNOSIS AND MDM:  1) Pt presents with acute abdominal pain; vital signs stable with currently a non-peritoneal exam; DDx includes: Gastroenteritis, hepatitis, pancreatitis, obstruction, appendicitis, viral illness, IBD, diverticulitis, mesenteric ischemia, AAA or descending dissection, ACS, kidney stone. Will get labs, treat symptomatically and obtain serial abdominal exams to determine if additional imaging is indicated. Will reassess and monitor closely. 2) Patient presents with dental pain. Stable vitals and exam without obvious abscess that needs drainage. Airway stable and patient speaking in full sentences. No red flags that make PTA, RPA, ludwigs angina concerning. Will tx with dental ball, dental block PRN, antibiotics and outpatient analgesics. Pt was given information on dentists and importance of follow-up. Pertinent Chronic Medical Conditions or Prior Surgeries: Listed under PMH above and includes:  Past Medical History:   Diagnosis Date    Arthritis     gout    CKD (chronic kidney disease) stage 3, GFR 30-59 ml/min ([facility])     no left kidney (congenital); Stage II kidney failure    HLD (hyperlipidemia)     Hypertension     MI (mitral incompetence)     5/10/13    Psychiatric disorder     Depression & PTSD    PTSD (post-traumatic stress disorder)     Seizures ([address]     ? r/t PTSD- last seizure 2011     HYPERTENSION COUNSELING  For 7 minutes, education was provided to the patient today regarding their hypertension. Patient is made aware of their elevated blood pressure and is instructed to follow up this week with their Primary Care for a recheck. Patient is counseled regarding consequences of chronic, uncontrolled hypertension including kidney disease, heart disease, stroke or even death. Patient states their understanding and agrees to follow up this week. Additionally, during their visit, I discussed sodium restriction, maintaining ideal body weight and regular exercise program as physiologic means to achieve blood pressure control. The patient will strive towards this.     Review of Prior Records and External Documents:   reviewed: YES - I have reviewed the patient's controlled substance prescription history thru the Prescription Monitoring Program so that the prescription(s) for a controlled substance can be given. Prior hospital discharge summaries and clinic notes reviewed: Reviewed PCP note 8/18/22. Pt seen by Dr. Alyce España for hx HTN, PTSD, solitary right kidney. Social Determinants of Health:  Patients evaluation and management were significantly impacted by social determinants of health. Social Determinants affecting Dx or Tx: None    Social History     Socioeconomic History    Marital status: SINGLE   Tobacco Use    Smoking status: Former     Packs/day: 0.30     Types: Cigarettes    Smokeless tobacco: Never   Substance and Sexual Activity    Alcohol use: No    Drug use: No     Types: Prescription    Sexual activity: Not Currently     Partners: Female     Birth control/protection: Condom     Social Determinants of Health     Financial Resource Strain: High Risk    Difficulty of Paying Living Expenses: Very hard   Food Insecurity: Food Insecurity Present    Worried About Running Out of Food in the Last Year: Often true    Ran Out of Food in the Last Year: Often true     ED Course: Progress Notes, Reevaluation, and Consults:  Initial assessment performed. I discussed presenting problems and concerns, and my formulated plan for today's visit with the patient and any available family members. I have encouraged them to ask questions as they arise throughout the visit.      ED Physician Orders:   Orders Placed This Encounter    CT ABD PELV WO CONT    CBC WITH AUTOMATED DIFF    METABOLIC PANEL, COMPREHENSIVE    LIPASE    URINALYSIS W/ REFLEX CULTURE    SALINE LOCK IV ONE TIME STAT    sodium chloride 0.9 % bolus infusion 1,000 mL    ketorolac (TORADOL) injection 15 mg    dental ball (lidocaine/Benadryl/Cetacaine) mixture    ondansetron (ZOFRAN) injection 4 mg    DISCONTD: iopamidoL (ISOVUE-370) 370 mg iodine /mL (76 %) injection 100 mL    ondansetron hcl (Zofran) 4 mg tablet    dicyclomine (BENTYL) 20 mg tablet    penicillin v potassium (VEETID) 500 mg tablet benzocaine (ORAJEL) 20 % gel topical gel    loperamide (IMODIUM) 2 mg capsule     ED Medications Given:   Medications   sodium chloride 0.9 % bolus infusion 1,000 mL (has no administration in time range)   ketorolac (TORADOL) injection 15 mg (has no administration in time range)   ondansetron (ZOFRAN) injection 4 mg (has no administration in time range)   dental ball (lidocaine/Benadryl/Cetacaine) mixture ( Mucous Membrane Given 3/6/23 0030)     Pt received IV/IM medications per above and placed on appropriate cardiac/respiratory monitoring due to drug toxicity. ED Physician Interpretation of Test Results: All results were independently reviewed and interpreted by myself, notably showing:     RADIOLOGY:  Non-plain film images such as CT, ultrasound and MRI are read by the radiologist. Vester Heap radiographic images are visualized and preliminarily interpreted by the ED Provider with the below findings:     Imaging interpreted by me:     Interpretation per the Radiologist below, if available at the time of this note:     CT ABD PELV WO CONT   Final Result   Diffusely fluid-filled small large bowel can be seen with enterocolitis due to   nonspecific infection or inflammation. Solitary right kidney. CT Results  (Last 48 hours)                 03/06/23 0117  CT ABD PELV WO CONT Final result    Impression:  Diffusely fluid-filled small large bowel can be seen with enterocolitis due to   nonspecific infection or inflammation. Solitary right kidney. Narrative:  EXAM:  CT ABD PELV WO CONT       INDICATION: Abdominal pain and diarrhea. COMPARISON: Ultrasound 9/21/2010. TECHNIQUE: Helical CT of the abdomen  and pelvis  without contrast. Coronal and   sagittal reformats are performed. CT dose reduction was achieved through use of   a standardized protocol tailored for this examination and automatic exposure   control for dose modulation.        FINDINGS:    Solid organ evaluation is limited without contrast.        The visualized lung bases demonstrate no mass or consolidation. The heart size   is normal. There is no pericardial or pleural effusion. There is a solitary right kidney without  calculus or hydronephrosis. The liver, spleen, pancreas, and adrenal glands are normal.  The gall bladder is   present  without intra- or extra-hepatic biliary dilatation. The small and large bowel are diffusely fluid-filled. There are no dilated bowel   loops. The appendix is normal. There is diffuse colonic diverticulosis without   focal adjacent inflammation. There are no enlarged lymph nodes. There is no free fluid or free air. The   aorta tapers without aneurysm. The urinary bladder is unremarkable. There is no pelvic mass. The bony structures are age-appropriate. CXR Results  (Last 48 hours)      None          CT ABD PELV WO CONT   Final Result   Diffusely fluid-filled small large bowel can be seen with enterocolitis due to   nonspecific infection or inflammation. Solitary right kidney. My interpretation of laboratory results:   CBC showing mildly elevated WBC 12.2, normal Hgb  CMP with elevated Cr 1.59 (baseline), normal electrolytes and LFT's    Progress Note:  I have just re-evaluated the patient. Pt reports improvement of his symptoms after ED medications. I have reviewed his vital signs and determined there is currently no worsening in their condition or physical exam. Results have been reviewed with them and their questions have been answered. I will continue to review further results as they come available.    Progress Note:      Amount and/or Complexity of Data Reviewed  HIGH complexity decision making performed   Presentation: ACUTE and SEVERE  Clinical lab tests: ordered as appropriate & reviewed  Tests in the radiology section of CPT®: ordered as appropriate & reviewed  Tests in the medicine section of CPT®: ordered as appropriate & reviewed  Obtain history from someone other than the patient: yes  Review and summarize past medical records: yes  Independent visualization of images, tracings, or specimens: yes    Risks  OTC drugs. Prescription drug management. Parenteral controlled substances. Drug therapy requiring intensive monitoring for toxicity. Shared Decision Making: I considered performing CT max/face, but did not after shared decision making with patient due to no clinical evidence of deep space infection or abscess. No red flags that make PTA, RPA, ludwigs angina concerning. Progress Note:  I have re-examined the patient. Pt states he feels much better and symptoms improved. Tolerating oral intake. Abdomen is soft and without guarding, rebound or other peritoneal signs. I have discussed with patient the importance of close f/u and to return to the ED if symptoms don't improve or worsen. DISCHARGE  Pt reassessed and symptoms noted to have improved significantly after ED treatment. Brittany Rodriguez labs and imaging have been reviewed with him and available family. He verbally conveys understanding and agreement of the signs, symptoms, diagnosis, treatment and prognosis and additionally agrees to follow up as recommended with Dr. Hussein Miller MD and/or specialist as instructed. He agrees with the care plan we have created and conveys that all of his questions have been answered. Additionally, I have put together a packet of discharge instructions for him that include: 1) Educational information regarding their diagnosis, 2) How to care for their diagnosis at home, as well a 3) List of reasons why they would want to return to the ED prior to their follow-up appointment should their condition change or symptoms worsen. I have answered all questions to the patient's satisfaction. Strict return precautions given. He and/or family conveyed understanding and agreement with care plan.  Vital signs stable for discharge. PLAN  1. Return precautions as discussed with patient and available family/caregiver. 2.   Discharge Medication List as of 3/6/2023  2:11 AM        START taking these medications    Details   ondansetron hcl (Zofran) 4 mg tablet Take 1 Tablet by mouth every eight (8) hours as needed for Nausea or Vomiting., Normal, Disp-20 Tablet, R-0      dicyclomine (BENTYL) 20 mg tablet Take 1 Tablet by mouth every six (6) hours. , Normal, Disp-20 Tablet, R-0      penicillin v potassium (VEETID) 500 mg tablet Take 1 Tablet by mouth four (4) times daily for 7 days. , Normal, Disp-28 Tablet, R-0      benzocaine (ORAJEL) 20 % gel topical gel Use as directed, Normal, Disp-30 g, R-0      loperamide (IMODIUM) 2 mg capsule Take 1 Capsule by mouth four (4) times daily as needed for Diarrhea for up to 10 days. , Normal, Disp-20 Capsule, R-0           CONTINUE these medications which have NOT CHANGED    Details   omeprazole (PRILOSEC) 20 mg capsule TAKE ONE CAPSULE BY MOUTH ONCE DAILY, Normal, Disp-90 Capsule, R-3      amitriptyline (ELAVIL) 100 mg tablet Take 100 mg by mouth At bedtime. , Historical Med      tiZANidine (ZANAFLEX) 2 mg tablet TAKE 1 TO 2 TABLETS BY MOUTH THREE TIMES DAILY AS NEEDED FOR SPASMS, Normal, Disp-540 Tab, R-0      hydrALAZINE (APRESOLINE) 25 mg tablet Take 25 mg by mouth two (2) times a day., Historical Med      pyridoxine, vitamin B6, (VITAMIN B-6) 100 mg tablet Take 1 Tab by mouth daily. , Normal, Disp-30 Tab, R-0      multivitamin (ONE A DAY) tablet Take 1 Tab by mouth daily. , Historical Med      Cholecalciferol, Vitamin D3, 5,000 unit Tab Take 10,000 Int'l Units by mouth daily. Indications: VITAMIN D DEFICIENCY, Historical Med      aspirin 81 mg tablet Take 1 Tab by mouth daily. , No Print, Disp-30 Tab, R-11      cyclobenzaprine (FLEXERIL) 10 mg tablet Take 1 Tablet by mouth three (3) times daily as needed for Muscle Spasm(s). , Normal, Disp-15 Tablet, R-0      lidocaine (LIDODERM) 5 % Apply patch to the affected area for 12 hours a day and remove for 12 hours a day., Normal, Disp-1 Each, R-0      atorvastatin (LIPITOR) 10 mg tablet Take 1 Tablet by mouth daily. , Normal, Disp-90 Tablet, R-4      LORazepam (ATIVAN) 0.5 mg tablet Take 1 Tablet by mouth every eight (8) hours as needed for Anxiety. Max Daily Amount: 1.5 mg., Normal, Disp-10 Tablet, R-0      tadalafiL (CIALIS) 20 mg tablet TAKE 1 TABLET BY MOUTH AS NEEDED, Normal, Disp-8 Tab, R-5           3. Follow-up Information       Follow up With Specialties Details Why Contact Info    Jamel Kumar MD Internal Medicine Physician  As needed, If symptoms worsen [address] [facility]  [phone]      [facility] EMERGENCY DEPT Emergency Medicine  As needed, If symptoms worsen [address] [facility]  [address] [facility]  [phone]          Instructed to return to ED if worse    FINAL DIAGNOSIS   Clinical Impression:  1. Dentalgia    2. Dental infection    3. Acute abdominal pain    4. Enterocolitis    5. Chronic kidney disease, unspecified CKD stage      Attestation:  I am the attending of record for this patient. I personally performed the services described in this documentation on this date, 3/6/2023 for patient, Kristine Sorenson. I have reviewed the chart and verified that the record is accurate and complete.       Maribeth Nails MD (Electronic Signature)

## 2023-03-06 NOTE — DISCHARGE INSTRUCTIONS
Emergency [address] by [facility] Critical access hospital  [address] [facility], [address]  Open M, W, F: 8AM - 5PM and T, Th: 8AM-6PM  Phone: [phone], press 4  $70 for Emergency Care  $60 for first routine care, then pay by sliding scale based upon income. [facility] [place]  [facility] [address] [place] [place]  Phone: [phone]    [employer]  [address] [facility], [address]  Open Monday - Friday 8AM - 4:30 PM  Phone: [phone] of Dentistry Urgent [address] [facility], [address] [facility], [facility] [facility] [address]   Phone: [phone] to confirm a time for emergency treatment   Pediatrics: [phone]   $75 per tooth, extractions only     [address] [facility], [address] [facility] [address], 2nd Floor, [address] [place] [address] starting at 8:30 AM - 3 PM [address] [place] [address] So[address] [place], [address] [address]   Phone [phone] or [phone]   Hours [id number] (extractions)   Simple tooth extraction: $60 per tooth, $55 per x-ray     [facility] Residents only, over the age of 25  Phone: [phone]. Leave message saying you need an appointment to register. Hours: Tuesday Evenings    [facility] (in [place])   [facility] AT [place] only   Phone: [phone], leave message saying you need an appointment to register   Hours: Wed 6-9p     Non-Urgent Margarito CHINCHILLA [address] [facility] at [address] [facility]   Dental Clinic: [phone]   Oral Surgery Clinic: [phone]       Thank You! It was a pleasure taking care of you in our Emergency Department today. We know that when you come to [facility], you are entrusting us with your health, comfort, and safety.  Our physicians and nurses honor that trust, and truly appreciate the opportunity to care for you and your loved ones. We also value your feedback. If you receive a survey about your Emergency Department experience today, please fill it out. We care about our patients' feedback, and we listen to what you have to say. Thank you. Dr. Harlan Piper M.D.      ________________________________________________________________________  I have included a copy of your lab results and/or radiologic studies from today's visit so you can have them easily available at your follow-up visit. We hope you feel better and please do not hesitate to contact the ED if you have any questions at all! Recent Results (from the past 12 hour(s))   CBC WITH AUTOMATED DIFF    Collection Time: 03/06/23 12:13 AM   Result Value Ref Range    WBC 12.2 (H) 4.1 - 11.1 K/uL    RBC 4.49 4. 10 - 5.70 M/uL    HGB 13.3 12.1 - 17.0 g/dL    HCT 40.7 36.6 - 50.3 %    MCV 90.6 80.0 - 99.0 FL    MCH 29.6 26.0 - 34.0 PG    MCHC 32.7 30.0 - 36.5 g/dL    RDW 13.8 11.5 - 14.5 %    PLATELET 617 300 - 792 K/uL    MPV 10.1 8.9 - 12.9 FL    NRBC 0.0 0  WBC    ABSOLUTE NRBC 0.00 0.00 - 0.01 K/uL    NEUTROPHILS 62 32 - 75 %    LYMPHOCYTES 30 12 - 49 %    MONOCYTES 8 5 - 13 %    EOSINOPHILS 0 0 - 7 %    BASOPHILS 0 0 - 1 %    IMMATURE GRANULOCYTES 0 0.0 - 0.5 %    ABS. NEUTROPHILS 7.5 1.8 - 8.0 K/UL    ABS. LYMPHOCYTES 3.6 (H) 0.8 - 3.5 K/UL    ABS. MONOCYTES 1.0 0.0 - 1.0 K/UL    ABS. EOSINOPHILS 0.0 0.0 - 0.4 K/UL    ABS. BASOPHILS 0.0 0.0 - 0.1 K/UL    ABS. IMM.  GRANS. 0.0 0.00 - 0.04 K/UL    DF AUTOMATED     METABOLIC PANEL, COMPREHENSIVE    Collection Time: 03/06/23 12:13 AM   Result Value Ref Range    Sodium 136 136 - 145 mmol/L    Potassium 3.6 3.5 - 5.1 mmol/L    Chloride 108 97 - 108 mmol/L    CO2 23 21 - 32 mmol/L    Anion gap 5 5 - 15 mmol/L    Glucose 118 (H) 65 - 100 mg/dL    BUN 15 6 - 20 MG/DL    Creatinine 1.59 (H) 0.70 - 1.30 MG/DL    BUN/Creatinine ratio 9 (L) 12 - 20 eGFR 51 (L) >60 ml/min/1.73m2    Calcium 9.0 8.5 - 10.1 MG/DL    Bilirubin, total 0.4 0.2 - 1.0 MG/DL    ALT (SGPT) 31 12 - 78 U/L    AST (SGOT) 46 (H) 15 - 37 U/L    Alk. phosphatase 48 45 - 117 U/L    Protein, total 7.5 6.4 - 8.2 g/dL    Albumin 3.9 3.5 - 5.0 g/dL    Globulin 3.6 2.0 - 4.0 g/dL    A-G Ratio 1.1 1.1 - 2.2     LIPASE    Collection Time: 03/06/23 12:13 AM   Result Value Ref Range    Lipase 158 73 - 393 U/L       CT ABD PELV WO CONT   Final Result   Diffusely fluid-filled small large bowel can be seen with enterocolitis due to   nonspecific infection or inflammation. Solitary right kidney. CT Results  (Last 48 hours)                 03/06/23 0117  CT ABD PELV WO CONT Final result    Impression:  Diffusely fluid-filled small large bowel can be seen with enterocolitis due to   nonspecific infection or inflammation. Solitary right kidney. Narrative:  EXAM:  CT ABD PELV WO CONT       INDICATION: Abdominal pain and diarrhea. COMPARISON: Ultrasound 9/21/2010. TECHNIQUE: Helical CT of the abdomen  and pelvis  without contrast. Coronal and   sagittal reformats are performed. CT dose reduction was achieved through use of   a standardized protocol tailored for this examination and automatic exposure   control for dose modulation. FINDINGS:    Solid organ evaluation is limited without contrast.        The visualized lung bases demonstrate no mass or consolidation. The heart size   is normal. There is no pericardial or pleural effusion. There is a solitary right kidney without  calculus or hydronephrosis. The liver, spleen, pancreas, and adrenal glands are normal.  The gall bladder is   present  without intra- or extra-hepatic biliary dilatation. The small and large bowel are diffusely fluid-filled. There are no dilated bowel   loops. The appendix is normal. There is diffuse colonic diverticulosis without   focal adjacent inflammation. There are no enlarged lymph nodes. There is no free fluid or free air. The   aorta tapers without aneurysm. The urinary bladder is unremarkable. There is no pelvic mass. The bony structures are age-appropriate. The exam and treatment you received in the Emergency Department were for an urgent problem and are not intended as complete care. It is important that you follow up with a doctor, nurse practitioner, or physician assistant for ongoing care. If your symptoms become worse or you do not improve as expected and you are unable to reach your usual health care provider, you should return to the Emergency Department. We are available 24 hours a day. Please take your discharge instructions with you when you go to your follow-up appointment. If a prescription has been provided, please have it filled as soon as possible to prevent a delay in treatment. Read the entire medication instruction sheet provided to you by the pharmacy. If you have any questions or reservations about taking the medication due to side effects or interactions with other medications, please call your primary care physician or contact the ER to speak with the charge nurse. Please make an appointment with your family doctor or the physician you were referred to for follow-up of this visit as instructed on your discharge paperwork. Return to the ER if you are unable to be seen or if you are unable to be seen in a timely manner. Should you experience abdominal pain lasting greater than 6 hours, chest pain, difficulty breathing, fever/chills, numbness/tingling, skin changes or other symptoms that concern you, return to the ED sooner. If you feel worse over the next 24 hours, please return to the ED. We are available 24 hours a day. Thank you for trusting us with your care!

## 2023-03-10 ENCOUNTER — OFFICE VISIT (OUTPATIENT)
Dept: INTERNAL MEDICINE CLINIC | Age: 58
End: 2023-03-10

## 2023-03-10 VITALS
DIASTOLIC BLOOD PRESSURE: 76 MMHG | TEMPERATURE: 97.1 F | RESPIRATION RATE: 16 BRPM | SYSTOLIC BLOOD PRESSURE: 116 MMHG | HEIGHT: 73 IN | WEIGHT: 159.6 LBS | OXYGEN SATURATION: 98 % | BODY MASS INDEX: 21.15 KG/M2 | HEART RATE: 97 BPM

## 2023-03-10 DIAGNOSIS — R97.20 ELEVATED PSA: ICD-10-CM

## 2023-03-10 DIAGNOSIS — R19.5 POSITIVE COLORECTAL CANCER SCREENING USING COLOGUARD TEST: Primary | ICD-10-CM

## 2023-03-10 DIAGNOSIS — N18.30 STAGE 3 CHRONIC KIDNEY DISEASE, UNSPECIFIED WHETHER STAGE 3A OR 3B CKD (HCC): ICD-10-CM

## 2023-03-10 DIAGNOSIS — K04.7 TOOTH ABSCESS: ICD-10-CM

## 2023-03-10 DIAGNOSIS — I10 HYPERTENSION, UNSPECIFIED TYPE: ICD-10-CM

## 2023-03-10 RX ORDER — GABAPENTIN 300 MG/1
300 CAPSULE ORAL
COMMUNITY
Start: 2022-07-11

## 2023-03-10 RX ORDER — RISPERIDONE 0.5 MG/1
0.5 TABLET, FILM COATED ORAL EVERY EVENING
COMMUNITY
Start: 2022-12-26

## 2023-03-10 NOTE — PROGRESS NOTES
HISTORY OF PRESENT ILLNESS  Billy Beltran is a 65 y.o. male. HPI  hx HTN , hx hyperglycemia with FH DM-2, HLD. PTSD heme positive stool , solitary kidney ( proteinuria)   In ER recenlttyy for dental and abdominal pain and diarrhea-cr up slightly 1.59--  Feeling well now  On PCN for tooth abscess -will fu at [facility] dental  No pain n/v/d  Last PSA 3.8-did not see URO. Pt reports FH prostate cancer  Last OV  Needs clearance to teeth extraction at [facility] in 2 weeks  No f/c cp sob syncope  No difficulty in the past with surgery or anesthesia    Patient Active Problem List    Diagnosis Date Noted    CKD (chronic kidney disease) stage 3, GFR 30-59 ml/min ([facility] [address] 03/05/2018    Ulnar neuropathy at elbow of left upper extremity 09/07/2016    Cervical post-laminectomy syndrome 09/07/2016    Cervical radiculopathy due to degenerative joint disease of spine 09/07/2016    PPD positive 01/12/2015    ADD (attention deficit disorder with hyperactivity) 11/15/2011    Proteinuria 02/07/2011    FH: CAD (coronary artery disease) 12/16/2010    Solitary kidney 12/14/2010    Gout 12/14/2010    Elevated LFT's 12/14/2010    HTN (hypertension) 02/23/2010    Hyperglycemia 02/23/2010    PTSD (post-traumatic stress disorder) 02/23/2010    Chronic neck pain 02/23/2010    Anxiety 02/23/2010    Family history of diabetes mellitus 02/23/2010     Current Outpatient Medications   Medication Sig Dispense Refill    gabapentin (NEURONTIN) 300 mg capsule Take 300 mg by mouth. risperiDONE (RisperDAL) 0.5 mg tablet Take 0.5 mg by mouth every evening. ondansetron hcl (Zofran) 4 mg tablet Take 1 Tablet by mouth every eight (8) hours as needed for Nausea or Vomiting. 20 Tablet 0    dicyclomine (BENTYL) 20 mg tablet Take 1 Tablet by mouth every six (6) hours. 20 Tablet 0    penicillin v potassium (VEETID) 500 mg tablet Take 1 Tablet by mouth four (4) times daily for 7 days.  28 Tablet 0    loperamide (IMODIUM) 2 mg capsule Take 1 Capsule by mouth four (4) times daily as needed for Diarrhea for up to 10 days. 20 Capsule 0    cyclobenzaprine (FLEXERIL) 10 mg tablet Take 1 Tablet by mouth three (3) times daily as needed for Muscle Spasm(s). 15 Tablet 0    atorvastatin (LIPITOR) 10 mg tablet Take 1 Tablet by mouth daily. 90 Tablet 4    omeprazole (PRILOSEC) 20 mg capsule TAKE ONE CAPSULE BY MOUTH ONCE DAILY 90 Capsule 3    amitriptyline (ELAVIL) 100 mg tablet Take 100 mg by mouth At bedtime. tiZANidine (ZANAFLEX) 2 mg tablet TAKE 1 TO 2 TABLETS BY MOUTH THREE TIMES DAILY AS NEEDED FOR SPASMS 540 Tab 0    hydrALAZINE (APRESOLINE) 25 mg tablet Take 25 mg by mouth two (2) times a day. multivitamin (ONE A DAY) tablet Take 1 Tab by mouth daily. Cholecalciferol, Vitamin D3, 5,000 unit Tab Take 10,000 Int'l Units by mouth daily. Indications: VITAMIN D DEFICIENCY      benzocaine (ORAJEL) 20 % gel topical gel Use as directed (Patient not taking: Reported on 3/10/2023) 30 g 0    lidocaine (LIDODERM) 5 % Apply patch to the affected area for 12 hours a day and remove for 12 hours a day. (Patient not taking: Reported on 3/10/2023) 1 Each 0    LORazepam (ATIVAN) 0.5 mg tablet Take 1 Tablet by mouth every eight (8) hours as needed for Anxiety. Max Daily Amount: 1.5 mg. (Patient not taking: Reported on 3/10/2023) 10 Tablet 0    tadalafiL (CIALIS) 20 mg tablet TAKE 1 TABLET BY MOUTH AS NEEDED (Patient not taking: Reported on 3/10/2023) 8 Tab 5    pyridoxine, vitamin B6, (VITAMIN B-6) 100 mg tablet Take 1 Tab by mouth daily. (Patient not taking: No sig reported) 30 Tab 0    aspirin 81 mg tablet Take 1 Tab by mouth daily.  (Patient not taking: Reported on 3/10/2023) 30 Tab 11     Allergies   Allergen Reactions    Morphine Itching      Lab Results   Component Value Date/Time    WBC 12.2 (H) 03/06/2023 12:13 AM    HGB 13.3 03/06/2023 12:13 AM    HCT 40.7 03/06/2023 12:13 AM    PLATELET 628 35/46/2141 12:13 AM    MCV 90.6 03/06/2023 12:13 AM     Lab Results   Component Value Date/Time    Hemoglobin A1c 5.8 (H) 08/15/2022 04:06 PM    Hemoglobin A1c 5.8 (H) 10/08/2019 11:18 AM    Hemoglobin A1c 5.8 (H) 02/11/2019 08:21 AM    Glucose 118 (H) 03/06/2023 12:13 AM    Microalb/Creat ratio (ug/mg creat.) 482.4 (H) 10/05/2017 02:48 PM    LDL, calculated 94 08/15/2022 04:06 PM    LDL, calculated 83 10/08/2019 11:18 AM    Creatinine 1.59 (H) 03/06/2023 12:13 AM      Lab Results   Component Value Date/Time    GFR est non-AA 53 (L) 10/08/2019 11:18 AM    GFR est AA 61 10/08/2019 11:18 AM    Creatinine 1.59 (H) 03/06/2023 12:13 AM    BUN 15 03/06/2023 12:13 AM    Sodium 136 03/06/2023 12:13 AM    Potassium 3.6 03/06/2023 12:13 AM    Chloride 108 03/06/2023 12:13 AM    CO2 23 03/06/2023 12:13 AM     Lab Results   Component Value Date/Time    Glucose 118 (H) 03/06/2023 12:13 AM         ROS    Physical Exam  Vitals and nursing note reviewed. Constitutional:       General: He is not in acute distress. Appearance: Normal appearance. He is well-developed. HENT:      Head: Normocephalic and atraumatic. Right Ear: Tympanic membrane normal.      Left Ear: Tympanic membrane normal.      Nose: Nose normal.      Mouth/Throat:      Mouth: Mucous membranes are moist.   Eyes:      Pupils: Pupils are equal, round, and reactive to light. Neck:      Vascular: No carotid bruit. Cardiovascular:      Rate and Rhythm: Normal rate and regular rhythm. Pulses: Normal pulses. Heart sounds: Normal heart sounds. No murmur heard. No friction rub. No gallop. Pulmonary:      Effort: Pulmonary effort is normal.      Breath sounds: Normal breath sounds. Abdominal:      General: Bowel sounds are normal.      Palpations: Abdomen is soft. Musculoskeletal:      Cervical back: Neck supple. Right lower leg: No edema. Left lower leg: No edema. Lymphadenopathy:      Cervical: No cervical adenopathy. Skin:     General: Skin is warm. Neurological:      General: No focal deficit present. Mental Status: He is alert. Psychiatric:         Mood and Affect: Mood normal.         Thought Content: Thought content normal.         Judgment: Judgment normal.     ASSESSMENT and PLAN    ICD-10-CM ICD-9-CM    1. Positive colorectal cancer screening using Cologuard test  R19.5 787.7 Colonoscopy next month      2. Stage 3 chronic kidney disease, unspecified whether stage 3a or 3b CKD ([facility] [address]  N18.30 585.3 REFERRAL TO NEPHROLOGY-reestablish care      METABOLIC PANEL, BASIC      3. Hypertension, unspecified type  I10 401.9 Controlled on hydralazine      4. Tooth abscess  K04.7 522.5 On PCN-fu [facility] dental      5.  Elevated PSA  R97.20 790.93 PSA, DIAGNOSTIC (PROSTATE SPECIFIC AG)      REFERRAL TO UROLOGY   Rtc 6 months wellness

## 2023-03-10 NOTE — PROGRESS NOTES
1. Have you been to the ER, urgent care clinic since your last visit? Hospitalized since your last visit? [facility] ED 3/6 for toothache and abdominal pain     2. Have you seen or consulted any other health care providers outside of the [address] [place] [address] since your last visit? Include any pap smears or colon screening.  No

## 2023-03-11 LAB
ANION GAP SERPL CALC-SCNC: 2 MMOL/L (ref 5–15)
BUN SERPL-MCNC: 15 MG/DL (ref 6–20)
BUN/CREAT SERPL: 9 (ref 12–20)
CALCIUM SERPL-MCNC: 9.1 MG/DL (ref 8.5–10.1)
CHLORIDE SERPL-SCNC: 108 MMOL/L (ref 97–108)
CO2 SERPL-SCNC: 28 MMOL/L (ref 21–32)
CREAT SERPL-MCNC: 1.58 MG/DL (ref 0.7–1.3)
GLUCOSE SERPL-MCNC: 106 MG/DL (ref 65–100)
POTASSIUM SERPL-SCNC: 5.4 MMOL/L (ref 3.5–5.1)
PSA SERPL-MCNC: 4 NG/ML (ref 0.01–4)
SODIUM SERPL-SCNC: 138 MMOL/L (ref 136–145)

## 2023-03-11 NOTE — PROGRESS NOTES
Tell pt potassium is up slightly --low potassium diet. Kidney function is unchanged from the ER--continue hydration with water but likely has some ckd3--needs to get fu with nephrologist. PSA ia also up to 4.0 from 3.8-needs to see URO MD for elevated PSA.

## 2023-04-24 RX ORDER — TADALAFIL 20 MG/1
TABLET ORAL
Qty: 8 TABLET | Refills: 5 | Status: SHIPPED | OUTPATIENT
Start: 2023-04-24

## 2023-04-24 RX ORDER — HYDRALAZINE HYDROCHLORIDE 25 MG/1
25 TABLET, FILM COATED ORAL 2 TIMES DAILY
Qty: 180 TABLET | Refills: 3 | Status: SHIPPED | OUTPATIENT
Start: 2023-04-24

## 2023-04-24 NOTE — TELEPHONE ENCOUNTER
Patient states he needs refills done thru [facility] on file/Indicated. Please call if any questions.  Thank you      Patient reminded of [address] around on refills

## 2023-05-01 ENCOUNTER — TRANSCRIBE ORDERS (OUTPATIENT)
Facility: HOSPITAL | Age: 58
End: 2023-05-01

## 2023-05-01 DIAGNOSIS — N40.0 BENIGN PROSTATIC HYPERPLASIA, UNSPECIFIED WHETHER LOWER URINARY TRACT SYMPTOMS PRESENT: Primary | ICD-10-CM

## 2023-05-01 DIAGNOSIS — R97.20 ELEVATED PROSTATE SPECIFIC ANTIGEN (PSA): ICD-10-CM

## 2023-05-13 ENCOUNTER — HOSPITAL ENCOUNTER (OUTPATIENT)
Facility: HOSPITAL | Age: 58
End: 2023-05-13
Payer: MEDICARE

## 2023-05-13 DIAGNOSIS — N40.0 BENIGN PROSTATIC HYPERPLASIA, UNSPECIFIED WHETHER LOWER URINARY TRACT SYMPTOMS PRESENT: ICD-10-CM

## 2023-05-13 DIAGNOSIS — R97.20 ELEVATED PROSTATE SPECIFIC ANTIGEN (PSA): ICD-10-CM

## 2023-05-13 PROCEDURE — 72197 MRI PELVIS W/O & W/DYE: CPT

## 2023-05-13 PROCEDURE — 6360000004 HC RX CONTRAST MEDICATION: Performed by: UROLOGY

## 2023-05-13 PROCEDURE — A9579 GAD-BASE MR CONTRAST NOS,1ML: HCPCS | Performed by: UROLOGY

## 2023-05-13 RX ADMIN — GADOTERIDOL 15 ML: 279.3 INJECTION, SOLUTION INTRAVENOUS at 11:19

## 2023-06-01 ENCOUNTER — TELEPHONE (OUTPATIENT)
Age: 58
End: 2023-06-01

## 2023-06-02 ENCOUNTER — TELEPHONE (OUTPATIENT)
Age: 58
End: 2023-06-02

## 2023-07-05 ENCOUNTER — HOSPITAL ENCOUNTER (OUTPATIENT)
Facility: HOSPITAL | Age: 58
Discharge: HOME | End: 2023-07-08
Attending: ORTHOPAEDIC SURGERY
Payer: MEDICARE

## 2023-07-05 DIAGNOSIS — M54.12 RIGHT CERVICAL RADICULOPATHY: ICD-10-CM

## 2023-07-05 DIAGNOSIS — M54.2 CERVICAL PAIN: ICD-10-CM

## 2023-07-05 DIAGNOSIS — M54.50 LUMBAR PAIN: ICD-10-CM

## 2023-07-05 DIAGNOSIS — M47.812 CERVICAL SPONDYLOSIS: ICD-10-CM

## 2023-07-05 DIAGNOSIS — M48.02 CERVICAL SPINAL STENOSIS: ICD-10-CM

## 2023-07-05 PROCEDURE — 72125 CT NECK SPINE W/O DYE: CPT

## 2023-08-22 RX ORDER — OMEPRAZOLE 20 MG/1
20 CAPSULE, DELAYED RELEASE ORAL DAILY
Qty: 90 CAPSULE | Refills: 3 | Status: SHIPPED | OUTPATIENT
Start: 2023-08-22

## 2023-08-22 NOTE — TELEPHONE ENCOUNTER
PCP: Ethel Snellen, MD    Last appt: 3/10/2023   Future Appointments   Date Time Provider [address]   9/11/2023  1:30 PM Grayson Mckenna MD [facility] BS AMB       Requested Prescriptions     Pending Prescriptions Disp Refills    omeprazole (PRILOSEC) 20 MG delayed release capsule 90 capsule 0     Sig: Take 1 capsule by mouth daily

## 2023-08-22 NOTE — TELEPHONE ENCOUNTER
Patient states he needs refill done thru [facility] [address] [facility] [address] on file Omeprazole (PRILOSEC) 20 MG delayed release capsule. Please call if any questions.  Thank you        Patient reminded of [address] Turn around on refills

## 2023-09-08 SDOH — ECONOMIC STABILITY: TRANSPORTATION INSECURITY
IN THE PAST 12 MONTHS, HAS LACK OF TRANSPORTATION KEPT YOU FROM MEETINGS, WORK, OR FROM GETTING THINGS NEEDED FOR DAILY LIVING?: YES

## 2023-09-08 SDOH — ECONOMIC STABILITY: HOUSING INSECURITY
IN THE LAST 12 MONTHS, WAS THERE A TIME WHEN YOU DID NOT HAVE A STEADY PLACE TO SLEEP OR SLEPT IN A SHELTER (INCLUDING NOW)?: NO

## 2023-09-08 SDOH — ECONOMIC STABILITY: FOOD INSECURITY: WITHIN THE PAST 12 MONTHS, YOU WORRIED THAT YOUR FOOD WOULD RUN OUT BEFORE YOU GOT MONEY TO BUY MORE.: OFTEN TRUE

## 2023-09-08 SDOH — ECONOMIC STABILITY: INCOME INSECURITY: HOW HARD IS IT FOR YOU TO PAY FOR THE VERY BASICS LIKE FOOD, HOUSING, MEDICAL CARE, AND HEATING?: SOMEWHAT HARD

## 2023-09-08 SDOH — HEALTH STABILITY: PHYSICAL HEALTH: ON AVERAGE, HOW MANY MINUTES DO YOU ENGAGE IN EXERCISE AT THIS LEVEL?: 30 MIN

## 2023-09-08 SDOH — ECONOMIC STABILITY: FOOD INSECURITY: WITHIN THE PAST 12 MONTHS, THE FOOD YOU BOUGHT JUST DIDN'T LAST AND YOU DIDN'T HAVE MONEY TO GET MORE.: OFTEN TRUE

## 2023-09-08 SDOH — HEALTH STABILITY: PHYSICAL HEALTH: ON AVERAGE, HOW MANY DAYS PER WEEK DO YOU ENGAGE IN MODERATE TO STRENUOUS EXERCISE (LIKE A BRISK WALK)?: 3 DAYS

## 2023-09-08 ASSESSMENT — PATIENT HEALTH QUESTIONNAIRE - PHQ9
SUM OF ALL RESPONSES TO PHQ QUESTIONS 1-9: 20
1. LITTLE INTEREST OR PLEASURE IN DOING THINGS: 1
7. TROUBLE CONCENTRATING ON THINGS, SUCH AS READING THE NEWSPAPER OR WATCHING TELEVISION: 3
SUM OF ALL RESPONSES TO PHQ QUESTIONS 1-9: 20
SUM OF ALL RESPONSES TO PHQ QUESTIONS 1-9: 20
3. TROUBLE FALLING OR STAYING ASLEEP: 3
9. THOUGHTS THAT YOU WOULD BE BETTER OFF DEAD, OR OF HURTING YOURSELF: 0
2. FEELING DOWN, DEPRESSED OR HOPELESS: 3
5. POOR APPETITE OR OVEREATING: 3
SUM OF ALL RESPONSES TO PHQ9 QUESTIONS 1 & 2: 4
SUM OF ALL RESPONSES TO PHQ QUESTIONS 1-9: 20
4. FEELING TIRED OR HAVING LITTLE ENERGY: 3
8. MOVING OR SPEAKING SO SLOWLY THAT OTHER PEOPLE COULD HAVE NOTICED. OR THE OPPOSITE, BEING SO FIGETY OR RESTLESS THAT YOU HAVE BEEN MOVING AROUND A LOT MORE THAN USUAL: 3
6. FEELING BAD ABOUT YOURSELF - OR THAT YOU ARE A FAILURE OR HAVE LET YOURSELF OR YOUR FAMILY DOWN: 1
10. IF YOU CHECKED OFF ANY PROBLEMS, HOW DIFFICULT HAVE THESE PROBLEMS MADE IT FOR YOU TO DO YOUR WORK, TAKE CARE OF THINGS AT HOME, OR GET ALONG WITH OTHER PEOPLE: 1

## 2023-09-08 ASSESSMENT — LIFESTYLE VARIABLES
HOW MANY STANDARD DRINKS CONTAINING ALCOHOL DO YOU HAVE ON A TYPICAL DAY: PATIENT DOES NOT DRINK
HOW OFTEN DO YOU HAVE A DRINK CONTAINING ALCOHOL: NEVER

## 2023-09-12 ASSESSMENT — LIFESTYLE VARIABLES
HOW MANY STANDARD DRINKS CONTAINING ALCOHOL DO YOU HAVE ON A TYPICAL DAY: 0
HOW OFTEN DO YOU HAVE A DRINK CONTAINING ALCOHOL: 1
HOW OFTEN DO YOU HAVE SIX OR MORE DRINKS ON ONE OCCASION: 1

## 2023-10-05 ENCOUNTER — OFFICE VISIT (OUTPATIENT)
Age: 58
End: 2023-10-05
Payer: MEDICARE

## 2023-10-05 VITALS
RESPIRATION RATE: 16 BRPM | HEIGHT: 73 IN | BODY MASS INDEX: 22.19 KG/M2 | TEMPERATURE: 97.2 F | OXYGEN SATURATION: 98 % | HEART RATE: 71 BPM | WEIGHT: 167.4 LBS | SYSTOLIC BLOOD PRESSURE: 130 MMHG | DIASTOLIC BLOOD PRESSURE: 80 MMHG

## 2023-10-05 DIAGNOSIS — I10 HYPERTENSION, UNSPECIFIED TYPE: ICD-10-CM

## 2023-10-05 DIAGNOSIS — Z00.00 MEDICARE ANNUAL WELLNESS VISIT, SUBSEQUENT: ICD-10-CM

## 2023-10-05 DIAGNOSIS — R19.5 HEME POSITIVE STOOL: Primary | ICD-10-CM

## 2023-10-05 DIAGNOSIS — E78.5 HYPERLIPIDEMIA, UNSPECIFIED HYPERLIPIDEMIA TYPE: ICD-10-CM

## 2023-10-05 DIAGNOSIS — M54.30 SCIATICA, UNSPECIFIED LATERALITY: ICD-10-CM

## 2023-10-05 PROCEDURE — 99213 OFFICE O/P EST LOW 20 MIN: CPT | Performed by: INTERNAL MEDICINE

## 2023-10-05 RX ORDER — OXCARBAZEPINE 600 MG/1
TABLET, FILM COATED ORAL
COMMUNITY

## 2023-10-05 RX ORDER — PAROXETINE 30 MG/1
1 TABLET, FILM COATED ORAL DAILY
COMMUNITY

## 2023-10-05 RX ORDER — HYDROCODONE BITARTRATE AND ACETAMINOPHEN 5; 325 MG/1; MG/1
TABLET ORAL
COMMUNITY

## 2023-10-05 RX ORDER — ASPIRIN 81 MG/1
81 TABLET ORAL DAILY
COMMUNITY

## 2023-10-05 ASSESSMENT — COLUMBIA-SUICIDE SEVERITY RATING SCALE - C-SSRS
2. HAVE YOU ACTUALLY HAD ANY THOUGHTS OF KILLING YOURSELF?: NO
1. WITHIN THE PAST MONTH, HAVE YOU WISHED YOU WERE DEAD OR WISHED YOU COULD GO TO SLEEP AND NOT WAKE UP?: NO
6. HAVE YOU EVER DONE ANYTHING, STARTED TO DO ANYTHING, OR PREPARED TO DO ANYTHING TO END YOUR LIFE?: NO

## 2023-10-05 ASSESSMENT — PATIENT HEALTH QUESTIONNAIRE - PHQ9
4. FEELING TIRED OR HAVING LITTLE ENERGY: 3
6. FEELING BAD ABOUT YOURSELF - OR THAT YOU ARE A FAILURE OR HAVE LET YOURSELF OR YOUR FAMILY DOWN: 0
7. TROUBLE CONCENTRATING ON THINGS, SUCH AS READING THE NEWSPAPER OR WATCHING TELEVISION: 1
SUM OF ALL RESPONSES TO PHQ QUESTIONS 1-9: 13
1. LITTLE INTEREST OR PLEASURE IN DOING THINGS: 3
8. MOVING OR SPEAKING SO SLOWLY THAT OTHER PEOPLE COULD HAVE NOTICED. OR THE OPPOSITE, BEING SO FIGETY OR RESTLESS THAT YOU HAVE BEEN MOVING AROUND A LOT MORE THAN USUAL: 0
3. TROUBLE FALLING OR STAYING ASLEEP: 3
2. FEELING DOWN, DEPRESSED OR HOPELESS: 3
10. IF YOU CHECKED OFF ANY PROBLEMS, HOW DIFFICULT HAVE THESE PROBLEMS MADE IT FOR YOU TO DO YOUR WORK, TAKE CARE OF THINGS AT HOME, OR GET ALONG WITH OTHER PEOPLE: 0
SUM OF ALL RESPONSES TO PHQ QUESTIONS 1-9: 13
SUM OF ALL RESPONSES TO PHQ QUESTIONS 1-9: 13
SUM OF ALL RESPONSES TO PHQ9 QUESTIONS 1 & 2: 6
5. POOR APPETITE OR OVEREATING: 0
SUM OF ALL RESPONSES TO PHQ QUESTIONS 1-9: 13
9. THOUGHTS THAT YOU WOULD BE BETTER OFF DEAD, OR OF HURTING YOURSELF: 0

## 2023-10-05 NOTE — PROGRESS NOTES
1. \"Have you been to the ER, urgent care clinic since your last visit? Hospitalized since your last visit? \" No     2. \"Have you seen or consulted any other health care providers outside of the [address] [place] [address] since your last visit? \" No      3. For patients aged 43-73: Has the patient had a colonoscopy / FIT/ Cologuard?  Yes
1. \"Have you been to the ER, urgent care clinic since your last visit? Hospitalized since your last visit? \" No    2. \"Have you seen or consulted any other health care providers outside of the [address] [place] [address] since your last visit? \"         [facility] Shank[facility]     3. For patients aged 43-73: Has the patient had a colonoscopy / FIT/ Cologuard?  No
Yes Automatic Reconciliation, Ar   cyclobenzaprine (FLEXERIL) 10 MG tablet Take 1 tablet by mouth 3 times daily as needed Yes Automatic Reconciliation, Ar   dicyclomine (BENTYL) 20 MG tablet Take 1 tablet by mouth in the morning and 1 tablet at noon and 1 tablet in the evening and 1 tablet before bedtime. Yes Automatic Reconciliation, Ar   gabapentin (NEURONTIN) 300 MG capsule Take 1 capsule by mouth. Yes Automatic Reconciliation, Ar   hydrALAZINE (APRESOLINE) 25 MG tablet Take 1 tablet by mouth 2 times daily Yes Automatic Reconciliation, Ar   LORazepam (ATIVAN) 0.5 MG tablet Take 1 tablet by mouth every 8 hours as needed. Yes Automatic Reconciliation, Ar   ondansetron (ZOFRAN) 4 MG tablet Take 1 tablet by mouth every 8 hours as needed Yes Automatic Reconciliation, Ar   pyridoxine (B-6) 100 MG tablet Take 1 tablet by mouth daily Yes Automatic Reconciliation, Ar   risperiDONE (RISPERDAL) 0.5 MG tablet Take 1 tablet by mouth every evening Yes Automatic Reconciliation, Ar   tiZANidine (ZANAFLEX) 2 MG tablet TAKE 1 TO 2 TABLETS BY MOUTH THREE TIMES DAILY AS NEEDED FOR SPASMS Yes Automatic Reconciliation, Ar   Benzocaine-Clove Oil 20 % GEL Use as directed  Patient not taking: Reported on 10/5/2023  Automatic Reconciliation, Ar   lidocaine (LIDODERM) 5 % Apply patch to the affected area for 12 hours a day and remove for 12 hours a day.   Patient not taking: Reported on 10/5/2023  Automatic Reconciliation, Ar   tadalafil (CIALIS) 20 MG tablet Take 1 tablet by mouth as needed  Patient not taking: Reported on 10/5/2023  Automatic Reconciliation, Ar       CareTeam (Including outside providers/suppliers regularly involved in providing care):   Patient Care Team:  Antonio Griffin MD as PCP - Deronda Boxer, MD as PCP - Empaneled Provider  Lima Luke MD as Surgeon  Beena Bradshaw DO as Physician     Reviewed and updated this visit:  Allergies  Meds

## 2023-11-29 DIAGNOSIS — F41.9 ANXIETY: Primary | ICD-10-CM

## 2023-11-29 RX ORDER — LORAZEPAM 0.5 MG/1
0.5 TABLET ORAL EVERY 8 HOURS PRN
Qty: 10 TABLET | Refills: 0 | Status: SHIPPED | OUTPATIENT
Start: 2023-11-29 | End: 2023-12-29

## 2023-11-29 NOTE — TELEPHONE ENCOUNTER
PCP: Dorothy Rice MD    Last appt: 10/5/2023   No future appointments. Requested Prescriptions     Pending Prescriptions Disp Refills    LORazepam (ATIVAN) 0.5 MG tablet 10 tablet 0     Sig: Take 1 tablet by mouth every 8 hours as needed for Anxiety.  Max Daily Amount: 1.5 mg

## 2024-01-10 DIAGNOSIS — E78.5 HYPERLIPIDEMIA, UNSPECIFIED HYPERLIPIDEMIA TYPE: Primary | ICD-10-CM

## 2024-01-10 RX ORDER — ATORVASTATIN CALCIUM 10 MG/1
10 TABLET, FILM COATED ORAL DAILY
Qty: 90 TABLET | Refills: 1 | Status: SHIPPED | OUTPATIENT
Start: 2024-01-10

## 2024-01-10 NOTE — TELEPHONE ENCOUNTER
PCP: Matt Wiggins MD    Last appt: 10/5/2023  No future appointments.    Requested Prescriptions     Pending Prescriptions Disp Refills    atorvastatin (LIPITOR) 10 MG tablet 90 tablet 1     Sig: Take 1 tablet by mouth daily

## 2024-01-10 NOTE — TELEPHONE ENCOUNTER
atorvastatin (LIPITOR) 10 MG tablet    Pharm has tried to get for two weeks.  Pt was advised if he didn't get in three days in the future to call our office    Refill [facility] #[phone]

## 2024-02-29 ENCOUNTER — TELEPHONE (OUTPATIENT)
Age: 59
End: 2024-02-29

## 2024-02-29 NOTE — TELEPHONE ENCOUNTER
Spoke with Esperanza. Irma allen that patient is prediabetic and has hypertension. Alicia states patient does not qualify for chronic condition special plan. They will contact and update patient.

## 2024-02-29 NOTE — TELEPHONE ENCOUNTER
Dominguez called in from [employer] to verify if patient has a cardiovascular disease and diabetes to qualify him for their chronic condition special plan with [facility]    Please call [facility] #[phone] ref #[id number] to discuss

## 2024-03-25 ENCOUNTER — TELEPHONE (OUTPATIENT)
Age: 59
End: 2024-03-25

## 2024-03-25 NOTE — TELEPHONE ENCOUNTER
Tono from [facility] called to request verification that pt has a chronic condition.  Chronic condition of: Cardiovascular disease and diabetes.    Please call to advise.    Ref #: [id number]  Call back to verify [phone]

## 2024-03-25 NOTE — TELEPHONE ENCOUNTER
Spoke with Matt. Irma allen that patient is prediabetic and has hypertension. Alicia states patient does not qualify for chronic condition special plan.

## 2024-05-14 DIAGNOSIS — E78.5 HYPERLIPIDEMIA, UNSPECIFIED HYPERLIPIDEMIA TYPE: ICD-10-CM

## 2024-05-14 RX ORDER — ATORVASTATIN CALCIUM 10 MG/1
10 TABLET, FILM COATED ORAL DAILY
Qty: 90 TABLET | Refills: 0 | Status: SHIPPED | OUTPATIENT
Start: 2024-05-14

## 2024-05-14 NOTE — TELEPHONE ENCOUNTER
Received faxed refill request from pharmacy      PCP: Matt Wiggins MD    Last appt: 10/5/2023  No future appointments.    Requested Prescriptions     Pending Prescriptions Disp Refills    atorvastatin (LIPITOR) 10 MG tablet 90 tablet 0     Sig: Take 1 tablet by mouth daily

## 2024-07-08 ENCOUNTER — HOSPITAL ENCOUNTER (EMERGENCY)
Facility: HOSPITAL | Age: 59
Discharge: HOME | End: 2024-07-09
Attending: STUDENT IN AN ORGANIZED HEALTH CARE EDUCATION/TRAINING PROGRAM
Payer: MEDICARE

## 2024-07-08 DIAGNOSIS — M54.31 SCIATICA OF RIGHT SIDE: Primary | ICD-10-CM

## 2024-07-08 PROCEDURE — 6370000000 HC RX 637 (ALT 250 FOR IP): Performed by: STUDENT IN AN ORGANIZED HEALTH CARE EDUCATION/TRAINING PROGRAM

## 2024-07-08 PROCEDURE — 99283 EMERGENCY DEPT VISIT LOW MDM: CPT

## 2024-07-08 RX ORDER — DIAZEPAM 5 MG/1
10 TABLET ORAL ONCE
Status: COMPLETED | OUTPATIENT
Start: 2024-07-08 | End: 2024-07-08

## 2024-07-08 RX ORDER — LIDOCAINE 4 G/G
1 PATCH TOPICAL
Status: DISCONTINUED | OUTPATIENT
Start: 2024-07-08 | End: 2024-07-09 | Stop reason: HOSPADM

## 2024-07-08 RX ADMIN — DIAZEPAM 10 MG: 5 TABLET ORAL at 23:49

## 2024-07-08 ASSESSMENT — PAIN DESCRIPTION - FREQUENCY
FREQUENCY: INTERMITTENT
FREQUENCY: INTERMITTENT

## 2024-07-08 ASSESSMENT — PAIN DESCRIPTION - LOCATION
LOCATION: BACK;LEG
LOCATION: BACK;LEG;BUTTOCKS

## 2024-07-08 ASSESSMENT — PAIN SCALES - GENERAL
PAINLEVEL_OUTOF10: 10

## 2024-07-08 ASSESSMENT — PAIN - FUNCTIONAL ASSESSMENT
PAIN_FUNCTIONAL_ASSESSMENT: 0-10

## 2024-07-08 ASSESSMENT — PAIN DESCRIPTION - ORIENTATION
ORIENTATION: RIGHT
ORIENTATION: RIGHT

## 2024-07-08 ASSESSMENT — PAIN DESCRIPTION - DESCRIPTORS
DESCRIPTORS: SHARP;SHOOTING
DESCRIPTORS: SHARP;SHOOTING

## 2024-07-08 ASSESSMENT — PAIN DESCRIPTION - ONSET
ONSET: GRADUAL
ONSET: ON-GOING

## 2024-07-08 ASSESSMENT — PAIN DESCRIPTION - PAIN TYPE
TYPE: ACUTE PAIN
TYPE: ACUTE PAIN

## 2024-07-09 ENCOUNTER — APPOINTMENT (OUTPATIENT)
Facility: HOSPITAL | Age: 59
End: 2024-07-09
Payer: MEDICARE

## 2024-07-09 VITALS
OXYGEN SATURATION: 99 % | TEMPERATURE: 98.3 F | SYSTOLIC BLOOD PRESSURE: 123 MMHG | BODY MASS INDEX: 21.2 KG/M2 | RESPIRATION RATE: 20 BRPM | DIASTOLIC BLOOD PRESSURE: 76 MMHG | WEIGHT: 160 LBS | HEART RATE: 83 BPM | HEIGHT: 73 IN

## 2024-07-09 RX ORDER — DIAZEPAM 5 MG/1
5 TABLET ORAL NIGHTLY PRN
Qty: 1 TABLET | Refills: 0 | Status: SHIPPED | OUTPATIENT
Start: 2024-07-09 | End: 2024-07-10

## 2024-07-09 RX ORDER — METHYLPREDNISOLONE 4 MG/1
TABLET ORAL
Qty: 1 KIT | Refills: 0 | Status: SHIPPED | OUTPATIENT
Start: 2024-07-09 | End: 2024-07-15

## 2024-07-09 ASSESSMENT — PAIN - FUNCTIONAL ASSESSMENT: PAIN_FUNCTIONAL_ASSESSMENT: 0-10

## 2024-07-09 ASSESSMENT — PAIN DESCRIPTION - LOCATION: LOCATION: BACK;LEG

## 2024-07-09 ASSESSMENT — PAIN DESCRIPTION - ONSET: ONSET: ON-GOING

## 2024-07-09 ASSESSMENT — PAIN DESCRIPTION - DESCRIPTORS: DESCRIPTORS: SHARP;SHOOTING

## 2024-07-09 ASSESSMENT — PAIN DESCRIPTION - ORIENTATION: ORIENTATION: RIGHT

## 2024-07-09 ASSESSMENT — PAIN DESCRIPTION - PAIN TYPE: TYPE: ACUTE PAIN

## 2024-07-09 ASSESSMENT — PAIN DESCRIPTION - FREQUENCY: FREQUENCY: INTERMITTENT

## 2024-07-09 ASSESSMENT — PAIN SCALES - GENERAL: PAINLEVEL_OUTOF10: 7

## 2024-07-09 NOTE — ED NOTES
Patient discharged from the ED by Dr. Riley. Diagnosis, medications, precautions and follow-ups were reviewed with the patient/family. Questions were asked and answered prior to departure. Patient departed the ED via ambulation and was accompanied by his nephew.

## 2024-07-09 NOTE — DISCHARGE INSTRUCTIONS
Go tomorrow to orthopedics. If you get worse we want you to come back to us and get a follow-up exam.Come back if you get fever, weakness, difficulty holding your urine or stool, worsening pain or numbness. If you wait too long to come back, there could be damage to the spinal cord that could cause problems with walking urine and stool control, numbness, weakness, or paralysis

## 2024-07-09 NOTE — ED PROVIDER NOTES
LIPITOR  Take 1 tablet by mouth daily     cyclobenzaprine 10 MG tablet  Commonly known as: FLEXERIL     dicyclomine 20 MG tablet  Commonly known as: BENTYL     gabapentin 300 MG capsule  Commonly known as: NEURONTIN     hydrALAZINE 25 MG tablet  Commonly known as: APRESOLINE     HYDROcodone-acetaminophen 5-325 MG per tablet  Commonly known as: NORCO     lidocaine 5 %  Commonly known as: LIDODERM     MULTIVITAMIN ADULT PO     omeprazole 20 MG delayed release capsule  Commonly known as: PRILOSEC  Take 1 capsule by mouth daily     ondansetron 4 MG tablet  Commonly known as: ZOFRAN     OXcarbazepine 600 MG tablet  Commonly known as: TRILEPTAL     PARoxetine 30 MG tablet  Commonly known as: PAXIL     pyridoxine 100 MG tablet  Commonly known as: B-6     risperiDONE 0.5 MG tablet  Commonly known as: RISPERDAL     tadalafil 20 MG tablet  Commonly known as: CIALIS     tiZANidine 2 MG tablet  Commonly known as: ZANAFLEX     vitamin D3 125 MCG (5000 UT) Tabs tablet  Commonly known as: CHOLECALCIFEROL     ZINC METHIONATE PO               Where to Get Your Medications        These medications were sent to [facility]/pharmacy #[id number] - [place], [place] - [address] - P [phone] - F [phone]  [address] [address]      Hours: 24-hours Phone: [phone]   diazePAM 5 MG tablet  methylPREDNISolone 4 MG tablet           DISCONTINUED MEDICATIONS:  Discharge Medication List as of 7/9/2024  1:05 AM          I am the Primary Clinician of Record.   Miriam Riley DO (electronically signed)      (Please note that parts of this dictation were completed with voice recognition software. Quite often unanticipated grammatical, syntax, homophones, and other interpretive errors are inadvertently transcribed by the computer software. Please disregards these errors. Please excuse any errors that have escaped final proofreading.)          Miriam Riley DO  07/09/24 [id number]

## 2024-07-18 ENCOUNTER — HOSPITAL ENCOUNTER (OUTPATIENT)
Facility: HOSPITAL | Age: 59
Discharge: HOME | End: 2024-07-18
Payer: MEDICARE

## 2024-07-18 DIAGNOSIS — M54.16 RIGHT LUMBAR RADICULOPATHY: ICD-10-CM

## 2024-07-18 DIAGNOSIS — M47.816 LUMBAR SPONDYLOSIS: ICD-10-CM

## 2024-07-18 PROCEDURE — 72148 MRI LUMBAR SPINE W/O DYE: CPT

## 2024-08-15 RX ORDER — OMEPRAZOLE 20 MG/1
20 CAPSULE, DELAYED RELEASE ORAL DAILY
Qty: 90 CAPSULE | Refills: 3 | Status: SHIPPED | OUTPATIENT
Start: 2024-08-15

## 2024-08-15 NOTE — TELEPHONE ENCOUNTER
Received faxed refill request from pharmacy      PCP: Matt Wiggins MD    Last appt: 10/5/2023  No future appointments.    Requested Prescriptions     Pending Prescriptions Disp Refills    omeprazole (PRILOSEC) 20 MG delayed release capsule 90 capsule 3     Sig: Take 1 capsule by mouth daily

## 2024-10-01 ENCOUNTER — TELEPHONE (OUTPATIENT)
Age: 59
End: 2024-10-01

## 2024-10-01 NOTE — TELEPHONE ENCOUNTER
Spoke with patient.     Pt states congestion is getting better but a little chest congestion and nasal drainage. Slight headaches.   No fever. No body aches. No chest pain. No sob.   Took vitamin c. Was lingering for about a week.     Please advise further   Pt due to next visit.

## 2024-10-01 NOTE — TELEPHONE ENCOUNTER
PCP: Matt Wiggins MD    Last appt: 10/5/2023   No future appointments.    Requested Prescriptions     Pending Prescriptions Disp Refills    omeprazole (PRILOSEC) 20 MG delayed release capsule 30 capsule 0     Sig: Take 1 capsule by mouth daily

## 2024-10-01 NOTE — TELEPHONE ENCOUNTER
Caller requests Refill of:    omeprazole (PRILOSEC) 20 MG delayed release capsule     Please send to:     [id number] [address] - P [phone] - F [phone]     Visit / Appointment History:  Future Appointment at [facility]:  Visit date not found   Last Appointment With PCP:  10/5/2023       Caller confirmed instructions and dosages as correct.    Caller was advised that Meds will be refilled as soon as possible, however there can be a 48-72 business hour turn around on refill requests.

## 2024-10-01 NOTE — TELEPHONE ENCOUNTER
Patient called in nasal congestion for about 1 week and wants to know if  can send something into pharmacy, negative for covid. Symptoms are nasal congestion & chest congestion

## 2024-10-02 ENCOUNTER — OFFICE VISIT (OUTPATIENT)
Age: 59
End: 2024-10-02
Payer: MEDICARE

## 2024-10-02 VITALS
OXYGEN SATURATION: 98 % | DIASTOLIC BLOOD PRESSURE: 84 MMHG | BODY MASS INDEX: 20.97 KG/M2 | SYSTOLIC BLOOD PRESSURE: 128 MMHG | HEART RATE: 89 BPM | HEIGHT: 73 IN | TEMPERATURE: 97.3 F | WEIGHT: 158.2 LBS | RESPIRATION RATE: 18 BRPM

## 2024-10-02 DIAGNOSIS — Z00.00 MEDICARE ANNUAL WELLNESS VISIT, SUBSEQUENT: ICD-10-CM

## 2024-10-02 DIAGNOSIS — F43.10 PTSD (POST-TRAUMATIC STRESS DISORDER): ICD-10-CM

## 2024-10-02 DIAGNOSIS — Z12.5 PROSTATE CANCER SCREENING: ICD-10-CM

## 2024-10-02 DIAGNOSIS — R73.09 ELEVATED GLUCOSE: ICD-10-CM

## 2024-10-02 DIAGNOSIS — J06.9 UPPER RESPIRATORY TRACT INFECTION, UNSPECIFIED TYPE: ICD-10-CM

## 2024-10-02 DIAGNOSIS — R97.20 ELEVATED PSA: ICD-10-CM

## 2024-10-02 DIAGNOSIS — R19.5 HEME POSITIVE STOOL: ICD-10-CM

## 2024-10-02 DIAGNOSIS — E78.5 HYPERLIPIDEMIA, UNSPECIFIED HYPERLIPIDEMIA TYPE: ICD-10-CM

## 2024-10-02 DIAGNOSIS — I10 HYPERTENSION, UNSPECIFIED TYPE: ICD-10-CM

## 2024-10-02 DIAGNOSIS — N18.30 STAGE 3 CHRONIC KIDNEY DISEASE, UNSPECIFIED WHETHER STAGE 3A OR 3B CKD (HCC): ICD-10-CM

## 2024-10-02 DIAGNOSIS — I10 HYPERTENSION, UNSPECIFIED TYPE: Primary | ICD-10-CM

## 2024-10-02 PROCEDURE — 99214 OFFICE O/P EST MOD 30 MIN: CPT | Performed by: INTERNAL MEDICINE

## 2024-10-02 RX ORDER — GABAPENTIN 100 MG/1
CAPSULE ORAL
COMMUNITY
Start: 2024-09-16

## 2024-10-02 RX ORDER — ATORVASTATIN CALCIUM 10 MG/1
10 TABLET, FILM COATED ORAL DAILY
Qty: 90 TABLET | Refills: 3 | Status: SHIPPED | OUTPATIENT
Start: 2024-10-02

## 2024-10-02 SDOH — ECONOMIC STABILITY: FOOD INSECURITY: WITHIN THE PAST 12 MONTHS, THE FOOD YOU BOUGHT JUST DIDN'T LAST AND YOU DIDN'T HAVE MONEY TO GET MORE.: NEVER TRUE

## 2024-10-02 SDOH — ECONOMIC STABILITY: INCOME INSECURITY: HOW HARD IS IT FOR YOU TO PAY FOR THE VERY BASICS LIKE FOOD, HOUSING, MEDICAL CARE, AND HEATING?: NOT HARD AT ALL

## 2024-10-02 SDOH — ECONOMIC STABILITY: FOOD INSECURITY: WITHIN THE PAST 12 MONTHS, YOU WORRIED THAT YOUR FOOD WOULD RUN OUT BEFORE YOU GOT MONEY TO BUY MORE.: NEVER TRUE

## 2024-10-02 ASSESSMENT — PATIENT HEALTH QUESTIONNAIRE - PHQ9
7. TROUBLE CONCENTRATING ON THINGS, SUCH AS READING THE NEWSPAPER OR WATCHING TELEVISION: SEVERAL DAYS
SUM OF ALL RESPONSES TO PHQ QUESTIONS 1-9: 6
5. POOR APPETITE OR OVEREATING: NOT AT ALL
3. TROUBLE FALLING OR STAYING ASLEEP: SEVERAL DAYS
4. FEELING TIRED OR HAVING LITTLE ENERGY: SEVERAL DAYS
1. LITTLE INTEREST OR PLEASURE IN DOING THINGS: SEVERAL DAYS
SUM OF ALL RESPONSES TO PHQ9 QUESTIONS 1 & 2: 2
2. FEELING DOWN, DEPRESSED OR HOPELESS: SEVERAL DAYS
SUM OF ALL RESPONSES TO PHQ QUESTIONS 1-9: 6
6. FEELING BAD ABOUT YOURSELF - OR THAT YOU ARE A FAILURE OR HAVE LET YOURSELF OR YOUR FAMILY DOWN: SEVERAL DAYS
8. MOVING OR SPEAKING SO SLOWLY THAT OTHER PEOPLE COULD HAVE NOTICED. OR THE OPPOSITE, BEING SO FIGETY OR RESTLESS THAT YOU HAVE BEEN MOVING AROUND A LOT MORE THAN USUAL: NOT AT ALL
SUM OF ALL RESPONSES TO PHQ QUESTIONS 1-9: 6
10. IF YOU CHECKED OFF ANY PROBLEMS, HOW DIFFICULT HAVE THESE PROBLEMS MADE IT FOR YOU TO DO YOUR WORK, TAKE CARE OF THINGS AT HOME, OR GET ALONG WITH OTHER PEOPLE: NOT DIFFICULT AT ALL
SUM OF ALL RESPONSES TO PHQ QUESTIONS 1-9: 6
9. THOUGHTS THAT YOU WOULD BE BETTER OFF DEAD, OR OF HURTING YOURSELF: NOT AT ALL

## 2024-10-02 ASSESSMENT — LIFESTYLE VARIABLES
HOW OFTEN DO YOU HAVE A DRINK CONTAINING ALCOHOL: NEVER
HOW MANY STANDARD DRINKS CONTAINING ALCOHOL DO YOU HAVE ON A TYPICAL DAY: PATIENT DOES NOT DRINK

## 2024-10-02 NOTE — PROGRESS NOTES
\"Have you been to the ER, urgent care clinic since your last visit?  Hospitalized since your last visit?\"    ER visit 7/2024 sciatic pain    “Have you seen or consulted any other health care providers outside our system since your last visit?”    [facility] // nephro      “Have you had a colorectal cancer screening such as a colonoscopy/FIT/Cologuard?    NO    Date of last Colonoscopy: 9/30/2010  No cologuard on file  No FIT/FOBT on file   No flexible sigmoidoscopy on file

## 2024-10-02 NOTE — PROGRESS NOTES
HISTORY OF PRESENT ILLNESS   Winston Madera   is a 58 y.o.  male.  hx HTN , hx hyperglycemia with FH DM-2, HLD. PTSD heme positive stool , solitary kidney ( proteinuria) vckd 3, proteinuria and medicare wellness---  Nephro Dr Vizcaino-ckd 3 /solitary kideny, proteinuria-stable labs per pt. Still on hydralazine  Uro Dr Lange elevated PSA--prostate MRI-----did not get the study or return  Psych Dr Sharp--PTSD stable to improved per pt  Ortho spine -OSVALDO and steroids helped right sciatica  GI-did not get colonoscopy for positive stool card  Recent cough and nasal congestion x 3d -no covid test. No fever. 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 Had difficulty getting a colon prep sent-he wants to see another GI MD now  Home BP-wn     Psych Dr Sharp-PTSD--no changes     Pain in right hip and right sciatica-Dr Urias-has  had injection and PT still difficulty walking      Patient Active Problem List    Diagnosis Date Noted    CKD (chronic kidney disease) stage 3, GFR 30-59 ml/min (formerly [facility]) 03/05/2018    Cervical post-laminectomy syndrome 09/07/2016    Ulnar neuropathy at elbow of left upper extremity 09/07/2016    Cervical radiculopathy due to degenerative joint disease of spine 09/07/2016    PPD positive 01/12/2015    Proteinuria 02/07/2011    FH: CAD (coronary artery disease) 12/16/2010    Gout 12/14/2010    PTSD (post-traumatic stress disorder) 02/23/2010    Anxiety 02/23/2010    Chronic neck pain 02/23/2010    Hyperglycemia 02/23/2010    HTN (hypertension) 02/23/2010    Family history of diabetes mellitus 02/23/2010     Current Outpatient Medications   Medication Sig Dispense Refill    omeprazole (PRILOSEC)

## 2024-10-04 LAB
ANION GAP SERPL CALC-SCNC: 4 MMOL/L (ref 2–12)
BASOPHILS # BLD: 0.1 K/UL (ref 0–0.1)
BASOPHILS NFR BLD: 1 % (ref 0–1)
BUN SERPL-MCNC: 12 MG/DL (ref 6–20)
BUN/CREAT SERPL: 7 (ref 12–20)
CALCIUM SERPL-MCNC: 9.3 MG/DL (ref 8.5–10.1)
CHLORIDE SERPL-SCNC: 109 MMOL/L (ref 97–108)
CHOLEST SERPL-MCNC: 182 MG/DL
CO2 SERPL-SCNC: 27 MMOL/L (ref 21–32)
CREAT SERPL-MCNC: 1.7 MG/DL (ref 0.7–1.3)
DIFFERENTIAL METHOD BLD: ABNORMAL
EOSINOPHIL # BLD: 0.1 K/UL (ref 0–0.4)
EOSINOPHIL NFR BLD: 2 % (ref 0–7)
ERYTHROCYTE [DISTWIDTH] IN BLOOD BY AUTOMATED COUNT: 14.6 % (ref 11.5–14.5)
EST. AVERAGE GLUCOSE BLD GHB EST-MCNC: 120 MG/DL
GLUCOSE SERPL-MCNC: 93 MG/DL (ref 65–100)
HBA1C MFR BLD: 5.8 % (ref 4–5.6)
HCT VFR BLD AUTO: 40.7 % (ref 36.6–50.3)
HDLC SERPL-MCNC: 70 MG/DL
HDLC SERPL: 2.6 (ref 0–5)
HGB BLD-MCNC: 12.8 G/DL (ref 12.1–17)
IMM GRANULOCYTES # BLD AUTO: 0 K/UL (ref 0–0.04)
IMM GRANULOCYTES NFR BLD AUTO: 0 % (ref 0–0.5)
LDLC SERPL CALC-MCNC: 95 MG/DL (ref 0–100)
LYMPHOCYTES # BLD: 3.3 K/UL (ref 0.8–3.5)
LYMPHOCYTES NFR BLD: 45 % (ref 12–49)
MCH RBC QN AUTO: 29.6 PG (ref 26–34)
MCHC RBC AUTO-ENTMCNC: 31.4 G/DL (ref 30–36.5)
MCV RBC AUTO: 94 FL (ref 80–99)
MONOCYTES # BLD: 0.9 K/UL (ref 0–1)
MONOCYTES NFR BLD: 12 % (ref 5–13)
NEUTS SEG # BLD: 2.9 K/UL (ref 1.8–8)
NEUTS SEG NFR BLD: 40 % (ref 32–75)
NRBC # BLD: 0 K/UL (ref 0–0.01)
NRBC BLD-RTO: 0 PER 100 WBC
PLATELET # BLD AUTO: 310 K/UL (ref 150–400)
PMV BLD AUTO: 11 FL (ref 8.9–12.9)
POTASSIUM SERPL-SCNC: 4.1 MMOL/L (ref 3.5–5.1)
PSA SERPL-MCNC: 4.2 NG/ML (ref 0.01–4)
RBC # BLD AUTO: 4.33 M/UL (ref 4.1–5.7)
SODIUM SERPL-SCNC: 140 MMOL/L (ref 136–145)
TRIGL SERPL-MCNC: 85 MG/DL
TSH SERPL DL<=0.05 MIU/L-ACNC: 0.84 UIU/ML (ref 0.36–3.74)
VLDLC SERPL CALC-MCNC: 17 MG/DL
WBC # BLD AUTO: 7.3 K/UL (ref 4.1–11.1)

## 2024-12-27 ENCOUNTER — OFFICE VISIT (OUTPATIENT)
Age: 59
End: 2024-12-27

## 2024-12-27 VITALS
DIASTOLIC BLOOD PRESSURE: 104 MMHG | HEART RATE: 78 BPM | RESPIRATION RATE: 16 BRPM | TEMPERATURE: 98.2 F | WEIGHT: 162 LBS | SYSTOLIC BLOOD PRESSURE: 165 MMHG | BODY MASS INDEX: 21.37 KG/M2 | OXYGEN SATURATION: 98 %

## 2024-12-27 DIAGNOSIS — L03.114 CELLULITIS OF LEFT UPPER EXTREMITY: Primary | ICD-10-CM

## 2024-12-27 DIAGNOSIS — L30.9 DERMATITIS: ICD-10-CM

## 2024-12-27 RX ORDER — PREDNISONE 20 MG/1
40 TABLET ORAL DAILY
Qty: 6 TABLET | Refills: 0 | Status: SHIPPED | OUTPATIENT
Start: 2024-12-27 | End: 2024-12-30

## 2024-12-27 RX ORDER — DOXYCYCLINE HYCLATE 100 MG
100 TABLET ORAL 2 TIMES DAILY
Qty: 14 TABLET | Refills: 0 | Status: SHIPPED | OUTPATIENT
Start: 2024-12-27 | End: 2025-01-03

## 2024-12-27 RX ORDER — TRIAMCINOLONE ACETONIDE 5 MG/G
1 CREAM TOPICAL 2 TIMES DAILY
Qty: 80 G | Refills: 0 | Status: SHIPPED | OUTPATIENT
Start: 2024-12-27 | End: 2025-01-03

## 2024-12-27 NOTE — PATIENT INSTRUCTIONS
Keep areas clean with warm soapy water twice daily and pat dry well, apply topical steroid as directed - do not use other products at this time.  Take oral steroid and antibiotic as directed until completed.  Take Tylenol and/or Motrin as directed when needed for pain and/or fever.  Can take together for more severe pain or alternate every 4 hours.  Return to clinic if symptoms worsen.  Go to ER if high fever, significant worsening of symptoms or new concerning symptoms.

## 2024-12-27 NOTE — PROGRESS NOTES
Patient Name: Winston Madera   YOB: 1965   Patient Status: New patient,   Chief Complaint: Rash (C/o of possible shingles or poison ivy on left upper body. Sx 1 week. States he was raking leaves in his milly. Sx consist of pain and itching.)      ____________________________________________________________________________________________    External Records Reviewed: None    Limitation to History: None    Outside Historian: None    SUBJECTIVE/OBJECTIVE:  Winston Madera is a 59 y.o. male presents with complaint of painful itchy rash to his left upper arm and under his left arm.  Symptoms began 1 week(s) ago and are worsening since onset.  He reports rash started after he was raking leaves.  He has been putting witch hazel and other home remedies and OTC remedies on the rash causing it to become more painful and now with some redness and swelling. The patient denies fever. No other acute symptoms reported at this time.          PAST MEDICAL HISTORY:   Medical: Pt  has a past medical history of Arthritis, CKD (chronic kidney disease) stage 3, GFR 30-59 ml/min ([facility]), HLD (hyperlipidemia), Hypertension, MI (mitral incompetence), Psychiatric disorder, PTSD (post-traumatic stress disorder), and Seizures ([facility]).  Surgical: Pt  has a past surgical history that includes orthopedic surgery (2007).  Family: Pt family history includes Cancer in his father; Diabetes in his father and mother; Heart Disease in his father; Hypertension in his sister.  Social: Pt   Social History     Socioeconomic History    Marital status: Single     Spouse name: Not on file    Number of children: Not on file    Years of education: Not on file    Highest education level: Not on file   Occupational History    Not on file   Tobacco Use    Smoking status: Former     Current packs/day: 0.30     Types: Cigarettes    Smokeless tobacco: Never   Substance and Sexual Activity    Alcohol use: No    Drug use: No     Types: Prescription    Sexual

## 2025-05-07 ENCOUNTER — OFFICE VISIT (OUTPATIENT)
Age: 60
End: 2025-05-07

## 2025-05-07 VITALS
OXYGEN SATURATION: 100 % | WEIGHT: 165 LBS | HEART RATE: 74 BPM | TEMPERATURE: 98.2 F | BODY MASS INDEX: 21.77 KG/M2 | SYSTOLIC BLOOD PRESSURE: 140 MMHG | RESPIRATION RATE: 18 BRPM | DIASTOLIC BLOOD PRESSURE: 80 MMHG

## 2025-05-07 DIAGNOSIS — L24.7 CONTACT DERMATITIS AND ECZEMA DUE TO PLANT: Primary | ICD-10-CM

## 2025-05-07 RX ORDER — METHYLPREDNISOLONE 4 MG/1
TABLET ORAL
Qty: 21 KIT | Refills: 0 | Status: SHIPPED | OUTPATIENT
Start: 2025-05-07 | End: 2025-05-13

## 2025-05-07 RX ORDER — TRIAMCINOLONE ACETONIDE 1 MG/G
OINTMENT TOPICAL 2 TIMES DAILY
Qty: 15 G | Refills: 0 | Status: SHIPPED | OUTPATIENT
Start: 2025-05-07 | End: 2025-05-14

## 2025-05-07 RX ORDER — LORAZEPAM 0.5 MG/1
TABLET ORAL
COMMUNITY

## 2025-05-07 RX ORDER — LOSARTAN POTASSIUM 50 MG/1
50 TABLET ORAL DAILY
COMMUNITY

## 2025-05-07 NOTE — PATIENT INSTRUCTIONS
Medrol Dosepak as directed.  May use triamcinolone cream twice a day as needed for itching.  May use oatmeal bath or cool compresses..  May utilize Benadryl by mouth as needed.  Noting that Benadryl may cause fatigue.  Follow-up with primary care provider 7 to 10 days.  Watch for signs of infection increased redness or drainage.

## 2025-05-07 NOTE — PROGRESS NOTES
General: No focal deficit present.      Mental Status: He is alert.   Psychiatric:         Attention and Perception: Attention normal.         Mood and Affect: Mood and affect normal.         ASSESSMENT/PLAN:    Clinical Impression:   Assessment & Plan  Contact dermatitis and eczema due to plant       Orders:    methylPREDNISolone (MEDROL DOSEPACK) 4 MG tablet; Take by mouth.         MDM: This 60 y/o male patient who presents with rash for  1 week, consistent with Miguel Ángel dermatitis. History and exam findings not consistent with dangerous etiologies of rash such as SJS/TEN, or secondary dangerous causes such as petechial rashes from thrombocytopenia or rickettsial infections. No signs of anaphylaxis present. Plan currently is to treat symptomatically, instruct to follow up with PCP and/or dermatology. Return precautions and ER precautions given and discussed.  Patient verbalized understanding and agreement with care plan.     Patient treated with oral medrol dose pack and provided with instructions. May use topical triamcinolone cream as needed. Patient instructed on the utilization of oral antihistamines.       An electronic signature was used to authenticate this note.    Sherry Curran PA-C

## 2025-06-14 ENCOUNTER — HOSPITAL ENCOUNTER (EMERGENCY)
Facility: HOSPITAL | Age: 60
Discharge: HOME | End: 2025-06-14
Payer: MEDICARE

## 2025-06-14 ENCOUNTER — APPOINTMENT (OUTPATIENT)
Facility: HOSPITAL | Age: 60
End: 2025-06-14
Payer: MEDICARE

## 2025-06-14 VITALS
OXYGEN SATURATION: 100 % | DIASTOLIC BLOOD PRESSURE: 88 MMHG | TEMPERATURE: 98.6 F | HEART RATE: 71 BPM | RESPIRATION RATE: 18 BRPM | SYSTOLIC BLOOD PRESSURE: 140 MMHG

## 2025-06-14 DIAGNOSIS — S05.02XA ABRASION OF LEFT CORNEA, INITIAL ENCOUNTER: Primary | ICD-10-CM

## 2025-06-14 DIAGNOSIS — S05.01XA ABRASION OF RIGHT CORNEA, INITIAL ENCOUNTER: ICD-10-CM

## 2025-06-14 DIAGNOSIS — E86.0 DEHYDRATION: ICD-10-CM

## 2025-06-14 LAB
ALBUMIN SERPL-MCNC: 3.7 G/DL (ref 3.5–5)
ALBUMIN/GLOB SERPL: 1.2 (ref 1.1–2.2)
ALP SERPL-CCNC: 60 U/L (ref 45–117)
ALT SERPL-CCNC: 25 U/L (ref 12–78)
ANION GAP SERPL CALC-SCNC: 7 MMOL/L (ref 2–12)
AST SERPL-CCNC: 38 U/L (ref 15–37)
BASOPHILS # BLD: 0.05 K/UL (ref 0–0.1)
BASOPHILS NFR BLD: 0.4 % (ref 0–1)
BILIRUB SERPL-MCNC: 0.4 MG/DL (ref 0.2–1)
BUN SERPL-MCNC: 21 MG/DL (ref 6–20)
BUN/CREAT SERPL: 13 (ref 12–20)
CALCIUM SERPL-MCNC: 8.9 MG/DL (ref 8.5–10.1)
CHLORIDE SERPL-SCNC: 105 MMOL/L (ref 97–108)
CO2 SERPL-SCNC: 26 MMOL/L (ref 21–32)
CREAT SERPL-MCNC: 1.68 MG/DL (ref 0.7–1.3)
DIFFERENTIAL METHOD BLD: ABNORMAL
EKG ATRIAL RATE: 67 BPM
EKG DIAGNOSIS: NORMAL
EKG P AXIS: 54 DEGREES
EKG P-R INTERVAL: 186 MS
EKG Q-T INTERVAL: 390 MS
EKG QRS DURATION: 102 MS
EKG QTC CALCULATION (BAZETT): 412 MS
EKG R AXIS: 81 DEGREES
EKG T AXIS: -25 DEGREES
EKG VENTRICULAR RATE: 67 BPM
EOSINOPHIL # BLD: 0.09 K/UL (ref 0–0.4)
EOSINOPHIL NFR BLD: 0.8 % (ref 0–7)
ERYTHROCYTE [DISTWIDTH] IN BLOOD BY AUTOMATED COUNT: 14.3 % (ref 11.5–14.5)
GLOBULIN SER CALC-MCNC: 3.1 G/DL (ref 2–4)
GLUCOSE BLD STRIP.AUTO-MCNC: 124 MG/DL (ref 65–117)
GLUCOSE SERPL-MCNC: 118 MG/DL (ref 65–100)
HCT VFR BLD AUTO: 37.8 % (ref 36.6–50.3)
HGB BLD-MCNC: 12.1 G/DL (ref 12.1–17)
IMM GRANULOCYTES # BLD AUTO: 0.06 K/UL (ref 0–0.04)
IMM GRANULOCYTES NFR BLD AUTO: 0.5 % (ref 0–0.5)
LYMPHOCYTES # BLD: 2.35 K/UL (ref 0.8–3.5)
LYMPHOCYTES NFR BLD: 19.6 % (ref 12–49)
MAGNESIUM SERPL-MCNC: 1.7 MG/DL (ref 1.6–2.4)
MCH RBC QN AUTO: 28.9 PG (ref 26–34)
MCHC RBC AUTO-ENTMCNC: 32 G/DL (ref 30–36.5)
MCV RBC AUTO: 90.2 FL (ref 80–99)
MONOCYTES # BLD: 0.58 K/UL (ref 0–1)
MONOCYTES NFR BLD: 4.8 % (ref 5–13)
NEUTS SEG # BLD: 8.85 K/UL (ref 1.8–8)
NEUTS SEG NFR BLD: 73.9 % (ref 32–75)
NRBC # BLD: 0 K/UL (ref 0–0.01)
NRBC BLD-RTO: 0 PER 100 WBC
PLATELET # BLD AUTO: 314 K/UL (ref 150–400)
PMV BLD AUTO: 9.7 FL (ref 8.9–12.9)
POTASSIUM SERPL-SCNC: 3.9 MMOL/L (ref 3.5–5.1)
PROT SERPL-MCNC: 6.8 G/DL (ref 6.4–8.2)
RBC # BLD AUTO: 4.19 M/UL (ref 4.1–5.7)
SERVICE CMNT-IMP: ABNORMAL
SODIUM SERPL-SCNC: 138 MMOL/L (ref 136–145)
TROPONIN I SERPL HS-MCNC: 8 NG/L (ref 0–76)
TROPONIN I SERPL HS-MCNC: 8 NG/L (ref 0–76)
WBC # BLD AUTO: 12 K/UL (ref 4.1–11.1)

## 2025-06-14 PROCEDURE — 83735 ASSAY OF MAGNESIUM: CPT

## 2025-06-14 PROCEDURE — 80053 COMPREHEN METABOLIC PANEL: CPT

## 2025-06-14 PROCEDURE — 6370000000 HC RX 637 (ALT 250 FOR IP)

## 2025-06-14 PROCEDURE — 99284 EMERGENCY DEPT VISIT MOD MDM: CPT

## 2025-06-14 PROCEDURE — 85025 COMPLETE CBC W/AUTO DIFF WBC: CPT

## 2025-06-14 PROCEDURE — 96375 TX/PRO/DX INJ NEW DRUG ADDON: CPT

## 2025-06-14 PROCEDURE — 82962 GLUCOSE BLOOD TEST: CPT

## 2025-06-14 PROCEDURE — 70450 CT HEAD/BRAIN W/O DYE: CPT

## 2025-06-14 PROCEDURE — 36415 COLL VENOUS BLD VENIPUNCTURE: CPT

## 2025-06-14 PROCEDURE — 96361 HYDRATE IV INFUSION ADD-ON: CPT

## 2025-06-14 PROCEDURE — 2580000003 HC RX 258

## 2025-06-14 PROCEDURE — 6360000002 HC RX W HCPCS

## 2025-06-14 PROCEDURE — 96374 THER/PROPH/DIAG INJ IV PUSH: CPT

## 2025-06-14 PROCEDURE — 84484 ASSAY OF TROPONIN QUANT: CPT

## 2025-06-14 RX ORDER — DIPHENHYDRAMINE HYDROCHLORIDE 50 MG/ML
25 INJECTION, SOLUTION INTRAMUSCULAR; INTRAVENOUS
Status: COMPLETED | OUTPATIENT
Start: 2025-06-14 | End: 2025-06-14

## 2025-06-14 RX ORDER — ACETAMINOPHEN 500 MG
1000 TABLET ORAL
Status: COMPLETED | OUTPATIENT
Start: 2025-06-14 | End: 2025-06-14

## 2025-06-14 RX ORDER — 0.9 % SODIUM CHLORIDE 0.9 %
1000 INTRAVENOUS SOLUTION INTRAVENOUS
Status: COMPLETED | OUTPATIENT
Start: 2025-06-14 | End: 2025-06-14

## 2025-06-14 RX ORDER — CETIRIZINE HYDROCHLORIDE 10 MG/1
10 TABLET ORAL DAILY
Qty: 30 TABLET | Refills: 0 | Status: SHIPPED | OUTPATIENT
Start: 2025-06-14

## 2025-06-14 RX ORDER — TETRACAINE HYDROCHLORIDE 5 MG/ML
1 SOLUTION OPHTHALMIC
Status: COMPLETED | OUTPATIENT
Start: 2025-06-14 | End: 2025-06-14

## 2025-06-14 RX ORDER — CIPROFLOXACIN HYDROCHLORIDE 3.5 MG/ML
2 SOLUTION/ DROPS TOPICAL 4 TIMES DAILY
Qty: 2.5 ML | Refills: 0 | Status: SHIPPED | OUTPATIENT
Start: 2025-06-14 | End: 2025-06-19

## 2025-06-14 RX ORDER — METOCLOPRAMIDE HYDROCHLORIDE 5 MG/ML
10 INJECTION INTRAMUSCULAR; INTRAVENOUS
Status: COMPLETED | OUTPATIENT
Start: 2025-06-14 | End: 2025-06-14

## 2025-06-14 RX ADMIN — TETRACAINE HYDROCHLORIDE 1 DROP: 5 SOLUTION OPHTHALMIC at 12:19

## 2025-06-14 RX ADMIN — SODIUM CHLORIDE 1000 ML: 0.9 INJECTION, SOLUTION INTRAVENOUS at 12:24

## 2025-06-14 RX ADMIN — DIPHENHYDRAMINE HYDROCHLORIDE 25 MG: 50 INJECTION INTRAMUSCULAR; INTRAVENOUS at 13:10

## 2025-06-14 RX ADMIN — ACETAMINOPHEN 1000 MG: 500 TABLET ORAL at 13:09

## 2025-06-14 RX ADMIN — FLUORESCEIN SODIUM 2 MG: 1 STRIP OPHTHALMIC at 12:19

## 2025-06-14 RX ADMIN — METOCLOPRAMIDE 10 MG: 5 INJECTION, SOLUTION INTRAMUSCULAR; INTRAVENOUS at 13:10

## 2025-06-14 ASSESSMENT — PAIN DESCRIPTION - ORIENTATION: ORIENTATION: ANTERIOR

## 2025-06-14 ASSESSMENT — VISUAL ACUITY
OD: 10/50
OU: 10/100
OS: 10/100

## 2025-06-14 ASSESSMENT — PAIN DESCRIPTION - DESCRIPTORS: DESCRIPTORS: DULL

## 2025-06-14 ASSESSMENT — PAIN DESCRIPTION - LOCATION: LOCATION: HEAD

## 2025-06-14 ASSESSMENT — PAIN SCALES - GENERAL: PAINLEVEL_OUTOF10: 2

## 2025-06-14 NOTE — ED NOTES
Initial EKG showed stemi. Pt denying any chest pain or shortness of breath at this time. Per MD Carballo repeat EKG, Repeat EKG did not show stemi, no code stemi called at this time.

## 2025-06-14 NOTE — DISCHARGE INSTRUCTIONS
Thank you for choosing our Emergency Department for your care.  It is our privilege to care for you in your time of need.  In the next several days, you may receive a survey via email or mailed to your home about your experience with our team.  We would greatly appreciate you taking a few minutes to complete the survey, as we use this information to learn what we have done well and what we could be doing better. Thank you for trusting us with your care!    Below you will find a list of your tests from today's visit.   Labs and Radiology Studies  Recent Results (from the past 12 hours)   POCT Glucose    Collection Time: 06/14/25 11:27 AM   Result Value Ref Range    POC Glucose 124 (H) 65 - 117 mg/dL    Performed by: Lilo Middleton RN    EKG 12 Lead    Collection Time: 06/14/25 11:36 AM   Result Value Ref Range    Ventricular Rate 67 BPM    Atrial Rate 67 BPM    P-R Interval 186 ms    QRS Duration 102 ms    Q-T Interval 390 ms    QTc Calculation (Bazett) 412 ms    P Axis 54 degrees    R Axis 81 degrees    T Axis -25 degrees    Diagnosis       *** Critical Test Result: STEMI  Normal sinus rhythm  ST elevation, consider anterolateral injury or acute infarct  ** ** ACUTE MI / STEMI ** **  Abnormal ECG  When compared with ECG of 04-MAR-2018 16:44,  Left anterior fascicular block is no longer present  Incomplete right bundle branch block is no longer present     CBC with Auto Differential    Collection Time: 06/14/25 12:25 PM   Result Value Ref Range    WBC 12.0 (H) 4.1 - 11.1 K/uL    RBC 4.19 4.10 - 5.70 M/uL    Hemoglobin 12.1 12.1 - 17.0 g/dL    Hematocrit 37.8 36.6 - 50.3 %    MCV 90.2 80.0 - 99.0 FL    MCH 28.9 26.0 - 34.0 PG    MCHC 32.0 30.0 - 36.5 g/dL    RDW 14.3 11.5 - 14.5 %    Platelets 314 150 - 400 K/uL    MPV 9.7 8.9 - 12.9 FL    Nucleated RBCs 0.0 0  WBC    nRBC 0.00 0.00 - 0.01 K/uL    Neutrophils % 73.9 32.0 - 75.0 %    Lymphocytes % 19.6 12.0 - 49.0 %    Monocytes % 4.8 (L) 5.0 - 13.0 %

## 2025-06-15 NOTE — ED PROVIDER NOTES
[facility] EMERGENCY DEPT  EMERGENCY DEPARTMENT HISTORY AND PHYSICAL EXAM      Date of evaluation: 6/14/2025  Patient Name: Winston Madera  Birthdate 1965  MRN: [id number]  ED Provider: JEANIE Alvarado NP   Note Started: 1:51 PM EDT 6/15/25    HISTORY OF PRESENT ILLNESS     Chief Complaint   Patient presents with    Dizziness    Blurred Vision     Pt wheeled into triage cc of blurred vision, headache, and feeling lightheaded for the past 2 days but feels worse today. States it feels like he has eyelashes in his eyes, states he was blowing leaves and dirt yesterday. Denies CP, SOB, NVDC. States he feels dehydrated. States he feels pressure behind his eyes.        History Provided By: Patient, only     HPI: Wisnton Madera is a 59 y.o. male with past medical history as listed below, presents ambulatory into the ED with complaints of generalized headache, generalized weakness \"cloudy\" vision in both eyes, nasal congestion and concern for foreign body in his eyes as he was cutting the grass yesterday felt debris fly in his face.  Also reports feeling a little lightheaded and dehydrated as he did not drink very much water while he was outside mowing the grass.  Blurry vision improved with blinking.  Pain also improves with blinking and when his eyes tear up.  Other than the watery discharge, no purulent drainage.  No vision loss.  Denies eye pain, extremity weakness/paresthesias, dizziness, chest pain, difficulty breathing, abdominal pain, nausea/vomiting, diarrhea, urinary symptoms, rash.    PAST MEDICAL HISTORY   Past Medical History:  Past Medical History:   Diagnosis Date    Arthritis     gout    CKD (chronic kidney disease) stage 3, GFR 30-59 ml/min ([facility])     no left kidney (congenital); Stage II kidney failure    HLD (hyperlipidemia)     Hypertension     MI (mitral incompetence)     5/10/13    Psychiatric disorder     Depression & PTSD    PTSD (post-traumatic stress disorder)     Seizures ([facility])     ? r/t PTSD- last

## 2025-06-16 ASSESSMENT — TONOMETRY
IOP_AUTOMATED: 1
OS_IOP_MMHG: 18
OD_IOP_MMHG: 18

## 2025-07-22 LAB
EKG ATRIAL RATE: 67 BPM
EKG ATRIAL RATE: 68 BPM
EKG DIAGNOSIS: NORMAL
EKG DIAGNOSIS: NORMAL
EKG P AXIS: 54 DEGREES
EKG P AXIS: 72 DEGREES
EKG P-R INTERVAL: 182 MS
EKG P-R INTERVAL: 186 MS
EKG Q-T INTERVAL: 388 MS
EKG Q-T INTERVAL: 390 MS
EKG QRS DURATION: 102 MS
EKG QRS DURATION: 106 MS
EKG QTC CALCULATION (BAZETT): 412 MS
EKG QTC CALCULATION (BAZETT): 412 MS
EKG R AXIS: -24 DEGREES
EKG R AXIS: 81 DEGREES
EKG T AXIS: -2 DEGREES
EKG T AXIS: -25 DEGREES
EKG VENTRICULAR RATE: 67 BPM
EKG VENTRICULAR RATE: 68 BPM